# Patient Record
Sex: FEMALE | Race: BLACK OR AFRICAN AMERICAN | NOT HISPANIC OR LATINO | Employment: OTHER | URBAN - NONMETROPOLITAN AREA
[De-identification: names, ages, dates, MRNs, and addresses within clinical notes are randomized per-mention and may not be internally consistent; named-entity substitution may affect disease eponyms.]

---

## 2019-01-27 ENCOUNTER — HISTORICAL (OUTPATIENT)
Dept: ADMINISTRATIVE | Facility: HOSPITAL | Age: 84
End: 2019-01-27

## 2019-02-01 ENCOUNTER — HISTORICAL (OUTPATIENT)
Dept: ADMINISTRATIVE | Facility: HOSPITAL | Age: 84
End: 2019-02-01

## 2020-01-22 ENCOUNTER — HISTORICAL (OUTPATIENT)
Dept: ADMINISTRATIVE | Facility: HOSPITAL | Age: 85
End: 2020-01-22

## 2021-04-21 ENCOUNTER — HISTORICAL (OUTPATIENT)
Dept: ADMINISTRATIVE | Facility: HOSPITAL | Age: 86
End: 2021-04-21

## 2021-06-04 ENCOUNTER — HISTORICAL (OUTPATIENT)
Dept: ADMINISTRATIVE | Facility: HOSPITAL | Age: 86
End: 2021-06-04

## 2021-10-18 ENCOUNTER — HISTORICAL (OUTPATIENT)
Dept: ADMINISTRATIVE | Facility: HOSPITAL | Age: 86
End: 2021-10-18

## 2022-10-13 ENCOUNTER — HISTORICAL (OUTPATIENT)
Dept: ADMINISTRATIVE | Facility: HOSPITAL | Age: 87
End: 2022-10-13

## 2023-03-03 ENCOUNTER — HOSPITAL ENCOUNTER (OUTPATIENT)
Facility: HOSPITAL | Age: 88
Discharge: HOME | End: 2023-03-04
Admitting: FAMILY MEDICINE
Payer: MEDICARE

## 2023-03-03 DIAGNOSIS — R50.9 FEVER, UNSPECIFIED FEVER CAUSE: Primary | ICD-10-CM

## 2023-03-03 DIAGNOSIS — R53.1 WEAKNESS: ICD-10-CM

## 2023-03-03 DIAGNOSIS — A41.9 SEPSIS: ICD-10-CM

## 2023-03-03 DIAGNOSIS — J06.9 VIRAL URI: ICD-10-CM

## 2023-03-03 LAB
ALBUMIN SERPL-MCNC: 4.3 G/DL (ref 3.4–5)
ALBUMIN/GLOB SERPL: 1 RATIO
ALP SERPL-CCNC: 98 UNIT/L (ref 50–144)
ALT SERPL-CCNC: 17 UNIT/L (ref 1–45)
ANION GAP SERPL CALC-SCNC: 9 MEQ/L (ref 2–13)
APPEARANCE UR: CLEAR
AST SERPL-CCNC: 26 UNIT/L (ref 14–36)
B PERT.PT PRMT NPH QL NAA+NON-PROBE: NOT DETECTED
BACTERIA #/AREA URNS AUTO: ABNORMAL /HPF
BASOPHILS # BLD AUTO: 0.02 X10(3)/MCL (ref 0.01–0.08)
BASOPHILS NFR BLD AUTO: 0.2 % (ref 0.1–1.2)
BILIRUB UR QL STRIP.AUTO: NEGATIVE MG/DL
BILIRUBIN DIRECT+TOT PNL SERPL-MCNC: 1.6 MG/DL (ref 0–1)
BUN SERPL-MCNC: 11 MG/DL (ref 7–20)
C PNEUM DNA NPH QL NAA+NON-PROBE: NOT DETECTED
CALCIUM SERPL-MCNC: 9.9 MG/DL (ref 8.4–10.2)
CHLORIDE SERPL-SCNC: 103 MMOL/L (ref 98–110)
CO2 SERPL-SCNC: 27 MMOL/L (ref 21–32)
COLOR UR AUTO: YELLOW
CREAT SERPL-MCNC: 0.7 MG/DL (ref 0.66–1.25)
CREAT/UREA NIT SERPL: 16 (ref 12–20)
EOSINOPHIL # BLD AUTO: 0.01 X10(3)/MCL (ref 0.04–0.36)
EOSINOPHIL NFR BLD AUTO: 0.1 % (ref 0.7–7)
ERYTHROCYTE [DISTWIDTH] IN BLOOD BY AUTOMATED COUNT: 11.6 % (ref 11–14.5)
FLUAV H1 2009 PAN RNA NPH NAA+NON-PROBE: NORMAL
FLUAV H1 RNA NPH QL NAA+NON-PROBE: NORMAL
FLUAV H3 RNA NPH QL NAA+NON-PROBE: NORMAL
FLUAV RNA NPH QL NAA+NON-PROBE: NOT DETECTED
FLUAV RNA RESP QL NAA+PROBE: NORMAL
FLUBV RNA NPH QL NAA+NON-PROBE: NOT DETECTED
GFR SERPLBLD CREATININE-BSD FMLA CKD-EPI: 85 MLS/MIN/1.73/M2
GLOBULIN SER-MCNC: 4.2 GM/DL (ref 2–3.9)
GLUCOSE SERPL-MCNC: 149 MG/DL (ref 70–115)
GLUCOSE UR QL STRIP.AUTO: NEGATIVE MG/DL
HADV DNA NPH QL NAA+NON-PROBE: NOT DETECTED
HCOV 229E RNA NPH QL NAA+NON-PROBE: NOT DETECTED
HCOV HKU1 RNA NPH QL NAA+NON-PROBE: NOT DETECTED
HCOV NL63 RNA NPH QL NAA+NON-PROBE: NOT DETECTED
HCOV OC43 RNA NPH QL NAA+NON-PROBE: NOT DETECTED
HCT VFR BLD AUTO: 39.2 % (ref 36–48)
HGB BLD-MCNC: 13 G/DL (ref 11.8–16)
HMPV RNA NPH QL NAA+NON-PROBE: NOT DETECTED
HPIV1 RNA NPH QL NAA+NON-PROBE: NOT DETECTED
HPIV2 RNA NPH QL NAA+NON-PROBE: NOT DETECTED
HPIV3 RNA NPH QL NAA+NON-PROBE: NOT DETECTED
HPIV4 RNA NPH QL NAA+NON-PROBE: NOT DETECTED
IMM GRANULOCYTES # BLD AUTO: 0.03 X10(3)/MCL (ref 0–0.03)
IMM GRANULOCYTES NFR BLD AUTO: 0.3 % (ref 0–0.5)
KETONES UR QL STRIP.AUTO: NEGATIVE MG/DL
LACTATE SERPL-SCNC: 1.2 MMOL/L (ref 0.4–2)
LEUKOCYTE ESTERASE UR QL STRIP.AUTO: NEGATIVE UNIT/L
LYMPHOCYTES # BLD AUTO: 1.19 X10(3)/MCL (ref 1.16–3.74)
LYMPHOCYTES NFR BLD AUTO: 10.3 % (ref 20–55)
M PNEUMO DNA NPH QL NAA+NON-PROBE: NOT DETECTED
MAGNESIUM SERPL-MCNC: 1.9 MG/DL (ref 1.8–2.4)
MCH RBC QN AUTO: 32.6 PG (ref 27–34)
MCV RBC AUTO: 98.2 FL (ref 79–99)
MEAN CELL HEMOGLOBIN CONCENTRATION (OHS) G/DL: 33.2 G/DL (ref 31–37)
MONOCYTES # BLD AUTO: 1.02 X10(3)/MCL (ref 0.24–0.36)
MONOCYTES NFR BLD AUTO: 8.9 % (ref 4.7–12.5)
MUCOUS THREADS URNS QL MICRO: ABNORMAL /LPF
NEUTROPHILS # BLD AUTO: 9.23 X10(3)/MCL (ref 1.56–6.13)
NEUTROPHILS NFR BLD AUTO: 80.2 % (ref 37–73)
NITRITE UR QL STRIP.AUTO: NEGATIVE
NRBC BLD AUTO-RTO: 0 % (ref 0–1)
PH UR STRIP.AUTO: 7.5 [PH]
PLATELET # BLD AUTO: 282 X10(3)/MCL (ref 140–371)
PLATELET # BLD EST: ADEQUATE 10*3/UL
PMV BLD AUTO: 9.5 FL (ref 9.4–12.4)
POTASSIUM SERPL-SCNC: 4 MMOL/L (ref 3.5–5.1)
PROT SERPL-MCNC: 8.5 GM/DL (ref 6.3–8.2)
PROT UR QL STRIP.AUTO: NEGATIVE MG/DL
RBC # BLD AUTO: 3.99 X10(6)/MCL (ref 4–5.1)
RBC #/AREA URNS AUTO: ABNORMAL /HPF
RBC MORPH BLD: NORMAL
RBC UR QL AUTO: ABNORMAL UNIT/L
RSV RNA NPH QL NAA+NON-PROBE: NOT DETECTED
RSV RNA NPH QL NAA+NON-PROBE: NOT DETECTED
RV+EV RNA NPH QL NAA+NON-PROBE: NOT DETECTED
SARS-COV-2 RNA RESP QL NAA+PROBE: NOT DETECTED
SODIUM SERPL-SCNC: 139 MMOL/L (ref 135–145)
SP GR UR STRIP.AUTO: 1.01
SQUAMOUS #/AREA URNS AUTO: ABNORMAL /HPF
UROBILINOGEN UR STRIP-ACNC: 1 MG/DL
WBC # SPEC AUTO: 11.5 X10(3)/MCL (ref 4–11.5)
WBC #/AREA URNS AUTO: ABNORMAL /HPF

## 2023-03-03 PROCEDURE — 87040 BLOOD CULTURE FOR BACTERIA: CPT

## 2023-03-03 PROCEDURE — 36415 COLL VENOUS BLD VENIPUNCTURE: CPT

## 2023-03-03 PROCEDURE — 81001 URINALYSIS AUTO W/SCOPE: CPT

## 2023-03-03 PROCEDURE — 87798 DETECT AGENT NOS DNA AMP: CPT

## 2023-03-03 PROCEDURE — 93005 ELECTROCARDIOGRAM TRACING: CPT

## 2023-03-03 PROCEDURE — 63600175 PHARM REV CODE 636 W HCPCS

## 2023-03-03 PROCEDURE — 25000003 PHARM REV CODE 250

## 2023-03-03 PROCEDURE — 80053 COMPREHEN METABOLIC PANEL: CPT

## 2023-03-03 PROCEDURE — 87088 URINE BACTERIA CULTURE: CPT

## 2023-03-03 PROCEDURE — 93010 EKG 12-LEAD: ICD-10-PCS | Mod: ,,, | Performed by: STUDENT IN AN ORGANIZED HEALTH CARE EDUCATION/TRAINING PROGRAM

## 2023-03-03 PROCEDURE — 96374 THER/PROPH/DIAG INJ IV PUSH: CPT

## 2023-03-03 PROCEDURE — 85025 COMPLETE CBC W/AUTO DIFF WBC: CPT

## 2023-03-03 PROCEDURE — 83605 ASSAY OF LACTIC ACID: CPT

## 2023-03-03 PROCEDURE — 96372 THER/PROPH/DIAG INJ SC/IM: CPT

## 2023-03-03 PROCEDURE — G0378 HOSPITAL OBSERVATION PER HR: HCPCS

## 2023-03-03 PROCEDURE — 93010 ELECTROCARDIOGRAM REPORT: CPT | Mod: ,,, | Performed by: STUDENT IN AN ORGANIZED HEALTH CARE EDUCATION/TRAINING PROGRAM

## 2023-03-03 PROCEDURE — 83735 ASSAY OF MAGNESIUM: CPT

## 2023-03-03 PROCEDURE — P9612 CATHETERIZE FOR URINE SPEC: HCPCS

## 2023-03-03 PROCEDURE — 99285 EMERGENCY DEPT VISIT HI MDM: CPT | Mod: 25

## 2023-03-03 RX ORDER — PANTOPRAZOLE SODIUM 40 MG/1
40 TABLET, DELAYED RELEASE ORAL DAILY
Status: DISCONTINUED | OUTPATIENT
Start: 2023-03-04 | End: 2023-03-04 | Stop reason: HOSPADM

## 2023-03-03 RX ORDER — SODIUM CHLORIDE 9 MG/ML
1000 INJECTION, SOLUTION INTRAVENOUS
Status: COMPLETED | OUTPATIENT
Start: 2023-03-03 | End: 2023-03-03

## 2023-03-03 RX ORDER — OLANZAPINE 5 MG/1
5 TABLET ORAL NIGHTLY
Status: DISCONTINUED | OUTPATIENT
Start: 2023-03-03 | End: 2023-03-04 | Stop reason: HOSPADM

## 2023-03-03 RX ORDER — DONEPEZIL HYDROCHLORIDE 10 MG/1
10 TABLET, FILM COATED ORAL NIGHTLY
Status: ON HOLD | COMMUNITY
Start: 2023-01-05 | End: 2023-04-24

## 2023-03-03 RX ORDER — IBUPROFEN 600 MG/1
600 TABLET ORAL EVERY 6 HOURS PRN
Status: DISCONTINUED | OUTPATIENT
Start: 2023-03-03 | End: 2023-03-04 | Stop reason: HOSPADM

## 2023-03-03 RX ORDER — SODIUM CHLORIDE 9 MG/ML
INJECTION, SOLUTION INTRAVENOUS ONCE
Status: DISCONTINUED | OUTPATIENT
Start: 2023-03-04 | End: 2023-03-03

## 2023-03-03 RX ORDER — PANTOPRAZOLE SODIUM 40 MG/1
40 TABLET, DELAYED RELEASE ORAL DAILY
Status: ON HOLD | COMMUNITY
Start: 2023-01-05 | End: 2023-04-24

## 2023-03-03 RX ORDER — ACETAMINOPHEN 500 MG
1000 TABLET ORAL
Status: COMPLETED | OUTPATIENT
Start: 2023-03-03 | End: 2023-03-03

## 2023-03-03 RX ORDER — BUDESONIDE AND FORMOTEROL FUMARATE DIHYDRATE 160; 4.5 UG/1; UG/1
2 AEROSOL RESPIRATORY (INHALATION) 2 TIMES DAILY
Status: ON HOLD | COMMUNITY
Start: 2023-01-25 | End: 2023-04-24

## 2023-03-03 RX ORDER — SODIUM CHLORIDE 9 MG/ML
INJECTION, SOLUTION INTRAVENOUS CONTINUOUS
Status: DISCONTINUED | OUTPATIENT
Start: 2023-03-03 | End: 2023-03-04 | Stop reason: HOSPADM

## 2023-03-03 RX ORDER — FLUTICASONE FUROATE AND VILANTEROL 100; 25 UG/1; UG/1
1 POWDER RESPIRATORY (INHALATION) DAILY
Status: DISCONTINUED | OUTPATIENT
Start: 2023-03-04 | End: 2023-03-04 | Stop reason: HOSPADM

## 2023-03-03 RX ORDER — ACETAMINOPHEN 325 MG/1
650 TABLET ORAL EVERY 8 HOURS PRN
Status: DISCONTINUED | OUTPATIENT
Start: 2023-03-03 | End: 2023-03-04 | Stop reason: HOSPADM

## 2023-03-03 RX ORDER — ENOXAPARIN SODIUM 100 MG/ML
40 INJECTION SUBCUTANEOUS EVERY 24 HOURS
Status: DISCONTINUED | OUTPATIENT
Start: 2023-03-03 | End: 2023-03-04 | Stop reason: HOSPADM

## 2023-03-03 RX ORDER — TALC
6 POWDER (GRAM) TOPICAL NIGHTLY PRN
Status: DISCONTINUED | OUTPATIENT
Start: 2023-03-03 | End: 2023-03-04 | Stop reason: HOSPADM

## 2023-03-03 RX ORDER — POLYETHYLENE GLYCOL 3350 17 G/17G
17 POWDER, FOR SOLUTION ORAL DAILY
Status: DISCONTINUED | OUTPATIENT
Start: 2023-03-04 | End: 2023-03-04 | Stop reason: HOSPADM

## 2023-03-03 RX ORDER — DEXAMETHASONE SODIUM PHOSPHATE 4 MG/ML
8 INJECTION, SOLUTION INTRA-ARTICULAR; INTRALESIONAL; INTRAMUSCULAR; INTRAVENOUS; SOFT TISSUE
Status: COMPLETED | OUTPATIENT
Start: 2023-03-03 | End: 2023-03-03

## 2023-03-03 RX ORDER — MONTELUKAST SODIUM 10 MG/1
10 TABLET ORAL DAILY
Status: ON HOLD | COMMUNITY
Start: 2023-01-05 | End: 2023-04-24

## 2023-03-03 RX ORDER — SODIUM CHLORIDE 0.9 % (FLUSH) 0.9 %
10 SYRINGE (ML) INJECTION
Status: DISCONTINUED | OUTPATIENT
Start: 2023-03-03 | End: 2023-03-04 | Stop reason: HOSPADM

## 2023-03-03 RX ORDER — OLANZAPINE 5 MG/1
5 TABLET ORAL NIGHTLY
Status: ON HOLD | COMMUNITY
Start: 2023-01-25 | End: 2023-03-04 | Stop reason: HOSPADM

## 2023-03-03 RX ORDER — DONEPEZIL HYDROCHLORIDE 10 MG/1
10 TABLET, FILM COATED ORAL NIGHTLY
Status: DISCONTINUED | OUTPATIENT
Start: 2023-03-03 | End: 2023-03-04 | Stop reason: HOSPADM

## 2023-03-03 RX ORDER — VITAMIN E MIXED 400 UNIT
400 CAPSULE ORAL DAILY
Status: DISCONTINUED | OUTPATIENT
Start: 2023-03-04 | End: 2023-03-04 | Stop reason: HOSPADM

## 2023-03-03 RX ORDER — MONTELUKAST SODIUM 10 MG/1
10 TABLET ORAL DAILY
Status: DISCONTINUED | OUTPATIENT
Start: 2023-03-04 | End: 2023-03-04 | Stop reason: HOSPADM

## 2023-03-03 RX ADMIN — SODIUM CHLORIDE: 9 INJECTION, SOLUTION INTRAVENOUS at 11:03

## 2023-03-03 RX ADMIN — DEXAMETHASONE SODIUM PHOSPHATE 8 MG: 4 INJECTION, SOLUTION INTRA-ARTICULAR; INTRALESIONAL; INTRAMUSCULAR; INTRAVENOUS; SOFT TISSUE at 09:03

## 2023-03-03 RX ADMIN — SODIUM CHLORIDE 1000 ML: 9 INJECTION, SOLUTION INTRAVENOUS at 07:03

## 2023-03-03 RX ADMIN — DONEPEZIL HYDROCHLORIDE 10 MG: 10 TABLET, FILM COATED ORAL at 11:03

## 2023-03-03 RX ADMIN — ENOXAPARIN SODIUM 40 MG: 100 INJECTION SUBCUTANEOUS at 11:03

## 2023-03-03 RX ADMIN — ACETAMINOPHEN 1000 MG: 500 TABLET, FILM COATED ORAL at 07:03

## 2023-03-04 VITALS
RESPIRATION RATE: 16 BRPM | DIASTOLIC BLOOD PRESSURE: 64 MMHG | SYSTOLIC BLOOD PRESSURE: 121 MMHG | HEIGHT: 61 IN | OXYGEN SATURATION: 94 % | TEMPERATURE: 97 F | HEART RATE: 70 BPM | BODY MASS INDEX: 21.84 KG/M2 | WEIGHT: 115.69 LBS

## 2023-03-04 LAB
ABS NEUT CALC (OHS): 6.7 X10(3)/MCL (ref 2.1–9.2)
ANION GAP SERPL CALC-SCNC: 7 MEQ/L (ref 2–13)
BUN SERPL-MCNC: 12 MG/DL (ref 7–20)
CALCIUM SERPL-MCNC: 9.3 MG/DL (ref 8.4–10.2)
CHLORIDE SERPL-SCNC: 108 MMOL/L (ref 98–110)
CO2 SERPL-SCNC: 25 MMOL/L (ref 21–32)
CREAT SERPL-MCNC: 0.51 MG/DL (ref 0.66–1.25)
CREAT/UREA NIT SERPL: 24 (ref 12–20)
ERYTHROCYTE [DISTWIDTH] IN BLOOD BY AUTOMATED COUNT: 11.3 % (ref 11–14.5)
GFR SERPLBLD CREATININE-BSD FMLA CKD-EPI: >90 MLS/MIN/1.73/M2
GLUCOSE SERPL-MCNC: 196 MG/DL (ref 70–115)
HCT VFR BLD AUTO: 36.4 % (ref 36–48)
HGB BLD-MCNC: 12.2 G/DL (ref 11.8–16)
IMM GRANULOCYTES # BLD AUTO: 0.04 X10(3)/MCL (ref 0–0.03)
IMM GRANULOCYTES NFR BLD AUTO: 0.5 % (ref 0–0.5)
LYMPHOCYTES NFR BLD MANUAL: 0.69 X10(3)/MCL
LYMPHOCYTES NFR BLD MANUAL: 9 % (ref 13–40)
MCH RBC QN AUTO: 32.6 PG (ref 27–34)
MCV RBC AUTO: 97.3 FL (ref 79–99)
MEAN CELL HEMOGLOBIN CONCENTRATION (OHS) G/DL: 33.5 G/DL (ref 31–37)
MONOCYTES NFR BLD MANUAL: 0.31 X10(3)/MCL (ref 0.1–1.3)
MONOCYTES NFR BLD MANUAL: 4 % (ref 2–11)
NEUTROPHILS NFR BLD MANUAL: 87 % (ref 47–80)
NRBC BLD AUTO-RTO: 0 % (ref 0–1)
PLATELET # BLD AUTO: 281 X10(3)/MCL (ref 140–371)
PLATELET # BLD EST: ADEQUATE 10*3/UL
PMV BLD AUTO: 9.8 FL (ref 9.4–12.4)
POTASSIUM SERPL-SCNC: 4.2 MMOL/L (ref 3.5–5.1)
RBC # BLD AUTO: 3.74 X10(6)/MCL (ref 4–5.1)
SODIUM SERPL-SCNC: 140 MMOL/L (ref 135–145)
TSH SERPL-ACNC: 0.39 UIU/ML (ref 0.36–3.74)
WBC # SPEC AUTO: 7.7 X10(3)/MCL (ref 4–11.5)

## 2023-03-04 PROCEDURE — 25000003 PHARM REV CODE 250

## 2023-03-04 PROCEDURE — G0378 HOSPITAL OBSERVATION PER HR: HCPCS

## 2023-03-04 PROCEDURE — 80048 BASIC METABOLIC PNL TOTAL CA: CPT | Performed by: INTERNAL MEDICINE

## 2023-03-04 PROCEDURE — 84443 ASSAY THYROID STIM HORMONE: CPT | Performed by: INTERNAL MEDICINE

## 2023-03-04 PROCEDURE — 36415 COLL VENOUS BLD VENIPUNCTURE: CPT | Performed by: INTERNAL MEDICINE

## 2023-03-04 PROCEDURE — 25000003 PHARM REV CODE 250: Performed by: INTERNAL MEDICINE

## 2023-03-04 PROCEDURE — 94640 AIRWAY INHALATION TREATMENT: CPT

## 2023-03-04 PROCEDURE — 94761 N-INVAS EAR/PLS OXIMETRY MLT: CPT

## 2023-03-04 PROCEDURE — 85027 COMPLETE CBC AUTOMATED: CPT | Performed by: INTERNAL MEDICINE

## 2023-03-04 PROCEDURE — 25000242 PHARM REV CODE 250 ALT 637 W/ HCPCS

## 2023-03-04 RX ADMIN — MONTELUKAST 10 MG: 10 TABLET, FILM COATED ORAL at 09:03

## 2023-03-04 RX ADMIN — PANTOPRAZOLE SODIUM 40 MG: 40 TABLET, DELAYED RELEASE ORAL at 09:03

## 2023-03-04 RX ADMIN — Medication 400 UNITS: at 09:03

## 2023-03-04 RX ADMIN — POLYETHYLENE GLYCOL 3350 17 G: 17 POWDER, FOR SOLUTION ORAL at 08:03

## 2023-03-04 RX ADMIN — THERA TABS 1 TABLET: TAB at 09:03

## 2023-03-04 RX ADMIN — FLUTICASONE FUROATE AND VILANTEROL TRIFENATATE 1 PUFF: 100; 25 POWDER RESPIRATORY (INHALATION) at 09:03

## 2023-03-04 NOTE — H&P
"[facility] Medicine  History & Physical    Patient Name: Lin Flowers  MRN: [id number]  Patient Class: OP- Observation  Admission Date: 3/3/2023  Attending Physician: Angela Agee MD  Primary Care Provider: Nilton Ndiaye MD         Patient information was obtained from via telemed    Subjective:     Principal Problem:<principal problem not specified>    Chief Complaint:   Chief Complaint   Patient presents with    Weakness     FAMILY REPORTS WEAKNESS AND FATIGUE FOR PAST FEW DAYS, SON "SHE JUST SLEEPS ALL DAY AND CAN BARELY STAND ANYMORE."        HPI: 88 yo female with hx of Dementia with behavior disturbance, asthma, GERD who is brought by family for 2-3 days of lethargy, no N/V/D, no myalgia, No dysuria, no HA, no neck pain, she is confused she had fever 101.8 in the ED. Ed physician wanted pt to be admitted for observation. Pt unable to give history family at bedside given the history, C/o weakness, poor appetite, dehydrated, last BM yesterday per family. Recently seen her PCP and reduced  her Zyprexa to 5 mg from 7.5 mg      Past Medical History:   Diagnosis Date    Asthma     Kidney stones        History reviewed. No pertinent surgical history.    Review of patient's allergies indicates:  No Known Allergies    No current facility-administered medications on file prior to encounter.     Current Outpatient Medications on File Prior to Encounter   Medication Sig    donepeziL (ARICEPT) 10 MG tablet Take 10 mg by mouth every evening.    montelukast (SINGULAIR) 10 mg tablet Take 10 mg by mouth once daily.    multivitamin capsule Take 1 capsule by mouth once daily.    OLANZapine (ZYPREXA) 5 MG tablet Take 5 mg by mouth every evening.    pantoprazole (PROTONIX) 40 MG tablet Take 40 mg by mouth once daily.    SYMBICORT 160-4.5 mcg/actuation HFAA 2 puffs 2 (two) times daily.    vitamin E 100 UNIT capsule Take 100 Units by mouth once daily.     Family History  "   None       Tobacco Use    Smoking status: Never    Smokeless tobacco: Never   Substance and Sexual Activity    Alcohol use: Never    Drug use: Never    Sexual activity: Not on file     Review of Systems   Constitutional:  Positive for appetite change, fatigue and fever.   HENT: Negative.     Eyes: Negative.    Respiratory:  Positive for cough.    Cardiovascular: Negative.    Gastrointestinal: Negative.    Endocrine: Negative.    Genitourinary: Negative.    Musculoskeletal: Negative.    Skin: Negative.    Allergic/Immunologic: Negative.    Neurological: Negative.    Hematological: Negative.    Psychiatric/Behavioral: Negative.     Objective:     Vital Signs (Most Recent):  Temp: 99.5 °F (37.5 °C) (03/03/23 2028)  Pulse: 86 (03/03/23 2246)  Resp: 20 (03/03/23 2246)  BP: (!) 152/72 (03/03/23 2246)  SpO2: 96 % (03/03/23 2246)   Vital Signs (24h Range):  Temp:  [99.5 °F (37.5 °C)-101.8 °F (38.8 °C)] 99.5 °F (37.5 °C)  Pulse:  [] 86  Resp:  [20-24] 20  SpO2:  [96 %-98 %] 96 %  BP: (134-157)/(69-72) 152/72     Weight: 53.1 kg (117 lb)  Body mass index is 22.11 kg/m².    Physical Exam  Constitutional:       Appearance: Normal appearance. She is normal weight.   HENT:      Head: Normocephalic and atraumatic.   Eyes:      Conjunctiva/sclera: Conjunctivae normal.   Cardiovascular:      Rate and Rhythm: Normal rate and regular rhythm.      Pulses: Normal pulses.      Heart sounds: Normal heart sounds.   Pulmonary:      Effort: Pulmonary effort is normal.      Breath sounds: Normal breath sounds.   Abdominal:      General: Abdomen is flat. Bowel sounds are normal.      Palpations: Abdomen is soft.   Musculoskeletal:         General: Normal range of motion.      Cervical back: Normal range of motion and neck supple.   Skin:     General: Skin is warm and dry.   Neurological:      General: No focal deficit present.      Mental Status: She is disoriented.   Psychiatric:         Mood and Affect: Mood normal.            Significant Labs: All pertinent labs within the past 24 hours have been reviewed.  ABGs: No results for input(s): PH, PCO2, HCO3, POCSATURATED, BE, TOTALHB, COHB, METHB, O2HB, POCFIO2, PO2 in the last 48 hours.  Bilirubin:   Recent Labs   Lab 03/03/23 1826   BILITOT 1.6*     Blood Culture: No results for input(s): LABBLOO in the last 48 hours.  BMP:   Recent Labs   Lab 03/03/23 1826      K 4.0   CO2 27   BUN 11.0   CREATININE 0.70   CALCIUM 9.9   MG 1.90     CBC:   Recent Labs   Lab 03/03/23 1826   WBC 11.5   HGB 13.0   HCT 39.2        CMP:   Recent Labs   Lab 03/03/23 1826      K 4.0   CO2 27   BUN 11.0   CREATININE 0.70   CALCIUM 9.9   ALBUMIN 4.3   BILITOT 1.6*   ALKPHOS 98   AST 26   ALT 17     Cardiac Markers: No results for input(s): CKMB, MYOGLOBIN, BNP, TROPISTAT in the last 48 hours.  Coagulation: No results for input(s): PT, INR, APTT in the last 48 hours.  Lactic Acid: No results for input(s): LACTATE in the last 48 hours.  Lipase: No results for input(s): LIPASE in the last 48 hours.  Lipid Panel: No results for input(s): CHOL, HDL, LDLCALC, TRIG, CHOLHDL in the last 48 hours.  Magnesium:   Recent Labs   Lab 03/03/23 1826   MG 1.90     Pathology Results  (Last 10 years)      None          POCT Glucose: No results for input(s): POCTGLUCOSE in the last 48 hours.  Prealbumin: No results for input(s): PREALBUMIN in the last 48 hours.  Respiratory Culture: No results for input(s): GSRESP, RESPIRATORYC in the last 48 hours.  Troponin: No results for input(s): TROPONINI, TROPONINIHS in the last 48 hours.  TSH: No results for input(s): TSH in the last 4320 hours.  Urine Culture: No results for input(s): LABURIN in the last 48 hours.  Urine Studies:   Recent Labs   Lab 03/03/23 2026   APPEARANCEUA Clear   PROTEINUA Negative   BILIRUBINUA Negative   UROBILINOGEN 1.0   LEUKOCYTESUR Negative   RBCUA 0-2   WBCUA 0-2   BACTERIA 2+*       Significant Imaging:   CXR no acute  finding    Assessment/Plan:     No notes have been filed under this hospital service.  Service: Hospital Medicine    VTE Risk Mitigation (From admission, onward)         Ordered     enoxaparin injection 40 mg  Daily         03/03/23 2249     IP VTE HIGH RISK PATIENT  Once         03/03/23 2249               - Fever unclear etiology ? Viral vs other cause  -Weakness  - Dehydration  -Dementia with behavior disturbance  -Asthma  -GERD  Plan:  - Hydrate and monitor   -Continue home meds  -Check TSH, Repeat labs in AM  _ DVT ppx on Lovenox  Pt is Full code SDM is her son Luis Antonio Flowers Sr  Pt is seen and examined via telemed. Pt is in [facility]. I am in [place], [place]. Nursing staff assisted with evaluation. Software is Audio/ Video  Pt is being admitted as an observation status to the hospitalist service for further eval and treatment    Angela Agee MD  Department of Hospital Medicine   [facility]

## 2023-03-04 NOTE — HPI
86 yo female with hx of Dementia with behavior disturbance, asthma, GERD who is brought by family for 2-3 days of lethargy, no N/V/D, no myalgia, No dysuria, no HA, no neck pain, she is confused she had fever 101.8 in the ED. Ed physician wanted pt to be admitted for observation. Pt unable to give history family at bedside given the history, C/o weakness, poor appetite, dehydrated, last BM yesterday per family. Recently seen her PCP and reduced  her Zyprexa to 5 mg from 7.5 mg

## 2023-03-04 NOTE — ED PROVIDER NOTES
"Encounter Date: 3/3/2023       History     Chief Complaint   Patient presents with    Weakness     FAMILY REPORTS WEAKNESS AND FATIGUE FOR PAST FEW DAYS, SON "SHE JUST SLEEPS ALL DAY AND CAN BARELY STAND ANYMORE."     HPI  Review of patient's allergies indicates:  No Known Allergies  Past Medical History:   Diagnosis Date    Asthma     Kidney stones      History reviewed. No pertinent surgical history.  History reviewed. No pertinent family history.  Social History     Tobacco Use    Smoking status: Never    Smokeless tobacco: Never   Substance Use Topics    Alcohol use: Never    Drug use: Never     Review of Systems    Physical Exam     Initial Vitals [03/03/23 1803]   BP Pulse Resp Temp SpO2   (!) 157/69 100 (!) 24 (!) 101.8 °F (38.8 °C) 97 %      MAP       --         Physical Exam    ED Course   Procedures  Labs Reviewed   COMPREHENSIVE METABOLIC PANEL - Abnormal; Notable for the following components:       Result Value    Glucose Level 149 (*)     Protein Total 8.5 (*)     Globulin 4.2 (*)     Bilirubin Total 1.6 (*)     All other components within normal limits   URINALYSIS, REFLEX TO URINE CULTURE - Abnormal; Notable for the following components:    Blood, UA Trace-Intact (*)     All other components within normal limits    Narrative:      URINE STABILITY IS 2 HOURS AT ROOM TEMP OR    SIX HOURS REFRIGERATED. PERFORMING TESTING ON    SPECIMENS GREATER THAN THIS AGE MAY AFFECT THE    FOLLOWING TESTS:    PH          SPECIFIC GRAVITY           BLOOD    CLARITY     BILIRUBIN               UROBILINOGEN   CBC WITH DIFFERENTIAL - Abnormal; Notable for the following components:    RBC 3.99 (*)     Neut % 80.2 (*)     Lymph % 10.3 (*)     Eos % 0.1 (*)     Neut # 9.23 (*)     Mono # 1.02 (*)     Eos # 0.01 (*)     All other components within normal limits   URINALYSIS, MICROSCOPIC - Abnormal; Notable for the following components:    Bacteria, UA 2+ (*)     Mucous, UA Small (*)     Squamous Epithelial Cells, UA Few (*)  "    All other components within normal limits   LACTIC ACID, PLASMA - Normal   MAGNESIUM - Normal   BLOOD SMEAR MICROSCOPIC EXAM ([facility]) - Normal   BLOOD CULTURE [facility]   BLOOD CULTURE [facility]   CULTURE, URINE   RESPIRATORY PANEL    Narrative:     The BioFire Respiratory Panel 2.1 (RP2.1) is a PCR-based multiplexed nucleic acid test intended for use with the BioFire® 2.0 for simultaneous qualitative detection and identification of multiple respiratory viral and bacterial nucleic acids in nasopharyngeal swabs (NPS) obtained from individuals suspected of respiratory tract infections.   CBC W/ AUTO DIFFERENTIAL    Narrative:     The following orders were created for panel order CBC auto differential.  Procedure                               Abnormality         Status                     ---------                               -----------         ------                     CBC with Differential[[id number]]        Abnormal            Final result                 Please view results for these tests on the individual orders.          Imaging Results              X-Ray Chest AP Portable (Preliminary result)  Result time 03/03/23 18:45:05      Wet Read by García Encarnacion MD (03/03/23 18:45:05, [facility], Emergency Medicine)    Normal cardiac silhouette, no infiltrates.                                     Medications   acetaminophen tablet 1,000 mg (1,000 mg Oral Given 3/3/23 1925)   0.9%  NaCl infusion (1,000 mLs Intravenous New Bag 3/3/23 1939)   dexAMETHasone injection 8 mg (8 mg Intravenous Given 3/3/23 2134)     Medical Decision Making:   CXR, labs point to a viral infection, but Resp. panel is negative.  Will order Decadron, and Observe overnight.                        Clinical Impression:   Final diagnoses:  [A41.9] Sepsis  [R50.9] Fever, unspecified fever cause (Primary)  [J06.9] Viral URI  [R53.1] Weakness        ED Disposition Condition    Observation Good                García Encarnacion,  MD  03/03/23 2129       García Encarnacion MD  03/03/23 2220

## 2023-03-04 NOTE — NURSING
Pt dc home with family, son present at the time of dc, verbalized understanding of dc instructions, pt to f/u with Dr Ndiaye in 1 week. Appt to be made by REJI on Monday 3/6/23,

## 2023-03-04 NOTE — DISCHARGE SUMMARY
[facility]  Hospital Medicine  Discharge Summary      Patient Name: Lni Flowers  MRN: [id number]  KIM: [id number]  Patient Class: OP- Observation  Admission Date: 3/3/2023  Hospital Length of Stay: 0 days  Discharge Date and Time:  03/04/2023 12:10 PM  Attending Physician: Nissa Miranda MD   Discharging Provider: Endy Don MD  Primary Care Provider: Nilton Ndiaye MD    Primary Care Team: Networked reference to record PCT     HPI:   88 yo female with hx of Dementia with behavior disturbance, asthma, GERD who is brought by family for 2-3 days of lethargy, no N/V/D, no myalgia, No dysuria, no HA, no neck pain, she is confused she had fever 101.8 in the ED. Ed physician wanted pt to be admitted for observation. Pt unable to give history family at bedside given the history, C/o weakness, poor appetite, dehydrated, last BM yesterday per family. Recently seen her PCP and reduced  her Zyprexa to 5 mg from 7.5 mg      * No surgery found *      Hospital Course:   03/04/2023 brief discharge summary elderly patient admitted to the hospital observation for fever of unknown origin.  Overnight her fever has resolved she is not had any antibiotics her lab looks normal there is no evidence of any infection she desperately wants to go home therefore we discharge her home with follow-up with Dr. Landa within a week.  She is to come back if she has any further fever.  Family does request that her Zyprexa be stopped because they do not think she needs it.       Goals of Care Treatment Preferences:  Code Status: Full Code      Consults:   Consults (From admission, onward)        Status Ordering Provider     IP consult to case management  Once        Provider:  (Not yet assigned)    Acknowledged NISSA MIRANDA     Inpatient virtual consult to Hospital Medicine  Once        Provider:  (Not yet assigned)    Acknowledged DHARA JOE          No notes have been filed under this hospital  service.  Service: Hospital Medicine    Final Active Diagnoses:    Diagnosis Date Noted POA    PRINCIPAL PROBLEM:  Fever [R50.9] 03/03/2023 Unknown    Weakness [R53.1] 03/03/2023 Unknown      Problems Resolved During this Admission:       Discharged Condition: good    Disposition: Home or Self Care    Follow Up:   Follow-up Information     Nilton Ndiaye MD Follow up in 1 week(s).    Specialty: Internal Medicine  [facility] information[facility]  [facility] [id number] [phone]                       Patient Instructions:   No discharge procedures on file.    Significant Diagnostic Studies:     Pending Diagnostic Studies:     None         Medications:  Reconciled Home Medications:      Medication List      CONTINUE taking these medications    donepeziL 10 MG tablet  Commonly known as: ARICEPT  Take 10 mg by mouth every evening.     montelukast 10 mg tablet  Commonly known as: SINGULAIR  Take 10 mg by mouth once daily.     multivitamin capsule  Take 1 capsule by mouth once daily.     pantoprazole 40 MG tablet  Commonly known as: PROTONIX  Take 40 mg by mouth once daily.     SYMBICORT 160-4.5 mcg/actuation Hfaa  Generic drug: budesonide-formoterol 160-4.5 mcg  2 puffs 2 (two) times daily.     vitamin E 100 UNIT capsule  Take 100 Units by mouth once daily.        STOP taking these medications    OLANZapine 5 MG tablet  Commonly known as: ZyPREXA            Indwelling Lines/Drains at time of discharge:   Lines/Drains/Airways     None               Physical Activity: Not on file     Time spent on the discharge of patient: 35 minutes    Physical Exam  Constitutional:       Appearance: Normal appearance. She is normal weight.   HENT:      Head: Normocephalic and atraumatic.   Eyes:      Conjunctiva/sclera: Conjunctivae normal.   Cardiovascular:      Rate and Rhythm: Normal rate and regular rhythm.      Pulses: Normal pulses.      Heart sounds: Normal heart sounds.   Pulmonary:      Effort: Pulmonary effort is  normal.      Breath sounds: Normal breath sounds.   Abdominal:      General: Abdomen is flat. Bowel sounds are normal.      Palpations: Abdomen is soft.   Musculoskeletal:         General: Normal range of motion.      Cervical back: Normal range of motion and neck supple.   Skin:     General: Skin is warm and dry.   Neurological:      General: No focal deficit present.      Mental Status: She is oriented.   Psychiatric:         Mood and Affect: Mood normal.          Endy Don MD  Department of Hospital Medicine  [facility]

## 2023-03-04 NOTE — HOSPITAL COURSE
03/04/2023 brief discharge summary elderly patient admitted to the hospital observation for fever of unknown origin.  Overnight her fever has resolved she is not had any antibiotics her lab looks normal there is no evidence of any infection she desperately wants to go home therefore we discharge her home with follow-up with Dr. Landa within a week.  She is to come back if she has any further fever.  Family does request that her Zyprexa be stopped because they do not think she needs it.

## 2023-03-04 NOTE — SUBJECTIVE & OBJECTIVE
Past Medical History:   Diagnosis Date    Asthma     Kidney stones        History reviewed. No pertinent surgical history.    Review of patient's allergies indicates:  No Known Allergies    No current facility-administered medications on file prior to encounter.     Current Outpatient Medications on File Prior to Encounter   Medication Sig    donepeziL (ARICEPT) 10 MG tablet Take 10 mg by mouth every evening.    montelukast (SINGULAIR) 10 mg tablet Take 10 mg by mouth once daily.    multivitamin capsule Take 1 capsule by mouth once daily.    OLANZapine (ZYPREXA) 5 MG tablet Take 5 mg by mouth every evening.    pantoprazole (PROTONIX) 40 MG tablet Take 40 mg by mouth once daily.    SYMBICORT 160-4.5 mcg/actuation HFAA 2 puffs 2 (two) times daily.    vitamin E 100 UNIT capsule Take 100 Units by mouth once daily.     Family History    None       Tobacco Use    Smoking status: Never    Smokeless tobacco: Never   Substance and Sexual Activity    Alcohol use: Never    Drug use: Never    Sexual activity: Not on file     Review of Systems   Constitutional:  Positive for appetite change, fatigue and fever.   HENT: Negative.     Eyes: Negative.    Respiratory:  Positive for cough.    Cardiovascular: Negative.    Gastrointestinal: Negative.    Endocrine: Negative.    Genitourinary: Negative.    Musculoskeletal: Negative.    Skin: Negative.    Allergic/Immunologic: Negative.    Neurological: Negative.    Hematological: Negative.    Psychiatric/Behavioral: Negative.     Objective:     Vital Signs (Most Recent):  Temp: 99.5 °F (37.5 °C) (03/03/23 2028)  Pulse: 86 (03/03/23 2246)  Resp: 20 (03/03/23 2246)  BP: (!) 152/72 (03/03/23 2246)  SpO2: 96 % (03/03/23 2246)   Vital Signs (24h Range):  Temp:  [99.5 °F (37.5 °C)-101.8 °F (38.8 °C)] 99.5 °F (37.5 °C)  Pulse:  [] 86  Resp:  [20-24] 20  SpO2:  [96 %-98 %] 96 %  BP: (134-157)/(69-72) 152/72     Weight: 53.1 kg (117 lb)  Body mass index is 22.11 kg/m².    Physical  Exam  Constitutional:       Appearance: Normal appearance. She is normal weight.   HENT:      Head: Normocephalic and atraumatic.   Eyes:      Conjunctiva/sclera: Conjunctivae normal.   Cardiovascular:      Rate and Rhythm: Normal rate and regular rhythm.      Pulses: Normal pulses.      Heart sounds: Normal heart sounds.   Pulmonary:      Effort: Pulmonary effort is normal.      Breath sounds: Normal breath sounds.   Abdominal:      General: Abdomen is flat. Bowel sounds are normal.      Palpations: Abdomen is soft.   Musculoskeletal:         General: Normal range of motion.      Cervical back: Normal range of motion and neck supple.   Skin:     General: Skin is warm and dry.   Neurological:      General: No focal deficit present.      Mental Status: She is disoriented.   Psychiatric:         Mood and Affect: Mood normal.           Significant Labs: All pertinent labs within the past 24 hours have been reviewed.  ABGs: No results for input(s): PH, PCO2, HCO3, POCSATURATED, BE, TOTALHB, COHB, METHB, O2HB, POCFIO2, PO2 in the last 48 hours.  Bilirubin:   Recent Labs   Lab 03/03/23  1826   BILITOT 1.6*     Blood Culture: No results for input(s): LABBLOO in the last 48 hours.  BMP:   Recent Labs   Lab 03/03/23  1826      K 4.0   CO2 27   BUN 11.0   CREATININE 0.70   CALCIUM 9.9   MG 1.90     CBC:   Recent Labs   Lab 03/03/23 1826   WBC 11.5   HGB 13.0   HCT 39.2        CMP:   Recent Labs   Lab 03/03/23  1826      K 4.0   CO2 27   BUN 11.0   CREATININE 0.70   CALCIUM 9.9   ALBUMIN 4.3   BILITOT 1.6*   ALKPHOS 98   AST 26   ALT 17     Cardiac Markers: No results for input(s): CKMB, MYOGLOBIN, BNP, TROPISTAT in the last 48 hours.  Coagulation: No results for input(s): PT, INR, APTT in the last 48 hours.  Lactic Acid: No results for input(s): LACTATE in the last 48 hours.  Lipase: No results for input(s): LIPASE in the last 48 hours.  Lipid Panel: No results for input(s): CHOL, HDL, LDLCALC, TRIG,  CHOLHDL in the last 48 hours.  Magnesium:   Recent Labs   Lab 03/03/23  1826   MG 1.90     Pathology Results  (Last 10 years)      None          POCT Glucose: No results for input(s): POCTGLUCOSE in the last 48 hours.  Prealbumin: No results for input(s): PREALBUMIN in the last 48 hours.  Respiratory Culture: No results for input(s): GSRESP, RESPIRATORYC in the last 48 hours.  Troponin: No results for input(s): TROPONINI, TROPONINIHS in the last 48 hours.  TSH: No results for input(s): TSH in the last 4320 hours.  Urine Culture: No results for input(s): LABURIN in the last 48 hours.  Urine Studies:   Recent Labs   Lab 03/03/23 2026   APPEARANCEUA Clear   PROTEINUA Negative   BILIRUBINUA Negative   UROBILINOGEN 1.0   LEUKOCYTESUR Negative   RBCUA 0-2   WBCUA 0-2   BACTERIA 2+*       Significant Imaging:   CXR no acute finding

## 2023-03-07 LAB — BACTERIA UR CULT: NO GROWTH

## 2023-03-08 LAB
BACTERIA BLD CULT: NORMAL
BACTERIA BLD CULT: NORMAL

## 2023-04-12 ENCOUNTER — HOSPITAL ENCOUNTER (INPATIENT)
Facility: HOSPITAL | Age: 88
LOS: 4 days | Discharge: HOSPICE | DRG: 870 | End: 2023-04-17
Attending: FAMILY MEDICINE | Admitting: FAMILY MEDICINE
Payer: MEDICARE

## 2023-04-12 DIAGNOSIS — S22.41XA CLOSED FRACTURE OF MULTIPLE RIBS OF RIGHT SIDE, INITIAL ENCOUNTER: ICD-10-CM

## 2023-04-12 DIAGNOSIS — J93.83 PNEUMOTHORAX, ACUTE: ICD-10-CM

## 2023-04-12 DIAGNOSIS — R06.02 SOB (SHORTNESS OF BREATH): ICD-10-CM

## 2023-04-12 DIAGNOSIS — I26.99 BILATERAL PULMONARY EMBOLISM: ICD-10-CM

## 2023-04-12 DIAGNOSIS — R09.2 RESPIRATORY ARREST: ICD-10-CM

## 2023-04-12 DIAGNOSIS — E11.65 TYPE 2 DIABETES MELLITUS WITH HYPERGLYCEMIA, WITHOUT LONG-TERM CURRENT USE OF INSULIN: ICD-10-CM

## 2023-04-12 DIAGNOSIS — Z46.82 ENCOUNTER FOR CHEST TUBE PLACEMENT: ICD-10-CM

## 2023-04-12 DIAGNOSIS — I46.9 CARDIAC ARREST: Primary | ICD-10-CM

## 2023-04-12 DIAGNOSIS — J93.9 PNEUMOTHORAX: ICD-10-CM

## 2023-04-12 LAB
BASE EXCESS BLD CALC-SCNC: -15.9 MMOL/L (ref -2–2)
BLOOD GAS SAMPLE TYPE (OHS): ABNORMAL
ERYTHROCYTE [DISTWIDTH] IN BLOOD BY AUTOMATED COUNT: 13.8 % (ref 11–14.5)
FIO2 BLOOD GAS (OHS): 100 %
HCO3 BLDA-SCNC: 12.2 MMOL/L (ref 22–26)
HCT VFR BLD AUTO: 25.7 % (ref 36–48)
HGB BLD-MCNC: 7.9 G/DL (ref 11.8–16)
IMM GRANULOCYTES # BLD AUTO: 0.9 X10(3)/MCL (ref 0–0.03)
IMM GRANULOCYTES NFR BLD AUTO: 6.1 % (ref 0–0.5)
MCH RBC QN AUTO: 31.2 PG (ref 27–34)
MCV RBC AUTO: 101.6 FL (ref 79–99)
MEAN CELL HEMOGLOBIN CONCENTRATION (OHS) G/DL: 30.7 G/DL (ref 31–37)
MECH RR (OHS): 16 B/MIN
MECH VT (OHS): 400 ML
MODE (OHS): AC
NOTIFIED (OHS): ABNORMAL
NOTIFIED BY (OHS): ABNORMAL
NOTIFIED TIME (OHS): 2352
NRBC BLD AUTO-RTO: 0.3 % (ref 0–1)
PCO2 BLDA: 39.4 MMHG (ref 35–45)
PEEP (OHS): 5 CMH2O
PH BLDA: 7.11 [PH] (ref 7.35–7.45)
PLATELET # BLD AUTO: 284 X10(3)/MCL (ref 140–371)
PMV BLD AUTO: 10.2 FL (ref 9.4–12.4)
PO2 BLDA: 487 MMHG (ref 80–105)
RBC # BLD AUTO: 2.53 X10(6)/MCL (ref 4–5.1)
SAO2 % BLDA: 100 % (ref 95–100)
WBC # SPEC AUTO: 14.8 X10(3)/MCL (ref 4–11.5)

## 2023-04-12 PROCEDURE — 87635 SARS-COV-2 COVID-19 AMP PRB: CPT | Performed by: FAMILY MEDICINE

## 2023-04-12 PROCEDURE — 85027 COMPLETE CBC AUTOMATED: CPT | Performed by: FAMILY MEDICINE

## 2023-04-12 PROCEDURE — 94761 N-INVAS EAR/PLS OXIMETRY MLT: CPT

## 2023-04-12 PROCEDURE — 80053 COMPREHEN METABOLIC PANEL: CPT | Performed by: FAMILY MEDICINE

## 2023-04-12 PROCEDURE — 85730 THROMBOPLASTIN TIME PARTIAL: CPT | Performed by: FAMILY MEDICINE

## 2023-04-12 PROCEDURE — 25000003 PHARM REV CODE 250

## 2023-04-12 PROCEDURE — 81001 URINALYSIS AUTO W/SCOPE: CPT | Performed by: FAMILY MEDICINE

## 2023-04-12 PROCEDURE — 94002 VENT MGMT INPAT INIT DAY: CPT

## 2023-04-12 PROCEDURE — 84484 ASSAY OF TROPONIN QUANT: CPT | Performed by: FAMILY MEDICINE

## 2023-04-12 PROCEDURE — 82803 BLOOD GASES ANY COMBINATION: CPT

## 2023-04-12 PROCEDURE — 99900035 HC TECH TIME PER 15 MIN (STAT)

## 2023-04-12 PROCEDURE — 87077 CULTURE AEROBIC IDENTIFY: CPT | Performed by: FAMILY MEDICINE

## 2023-04-12 PROCEDURE — 85610 PROTHROMBIN TIME: CPT | Performed by: FAMILY MEDICINE

## 2023-04-12 PROCEDURE — 99291 CRITICAL CARE FIRST HOUR: CPT

## 2023-04-12 PROCEDURE — 83735 ASSAY OF MAGNESIUM: CPT | Performed by: FAMILY MEDICINE

## 2023-04-12 PROCEDURE — 99900026 HC AIRWAY MAINTENANCE (STAT)

## 2023-04-12 PROCEDURE — 85379 FIBRIN DEGRADATION QUANT: CPT | Performed by: FAMILY MEDICINE

## 2023-04-12 PROCEDURE — 36600 WITHDRAWAL OF ARTERIAL BLOOD: CPT

## 2023-04-12 PROCEDURE — 83605 ASSAY OF LACTIC ACID: CPT | Performed by: FAMILY MEDICINE

## 2023-04-12 PROCEDURE — 27000221 HC OXYGEN, UP TO 24 HOURS

## 2023-04-12 PROCEDURE — 27200966 HC CLOSED SUCTION SYSTEM

## 2023-04-12 PROCEDURE — 99292 CRITICAL CARE ADDL 30 MIN: CPT

## 2023-04-12 PROCEDURE — 32551 INSERTION OF CHEST TUBE: CPT | Mod: RT

## 2023-04-12 PROCEDURE — 85025 COMPLETE CBC W/AUTO DIFF WBC: CPT | Performed by: FAMILY MEDICINE

## 2023-04-12 PROCEDURE — 36415 COLL VENOUS BLD VENIPUNCTURE: CPT | Performed by: FAMILY MEDICINE

## 2023-04-12 PROCEDURE — 96365 THER/PROPH/DIAG IV INF INIT: CPT

## 2023-04-12 PROCEDURE — P9612 CATHETERIZE FOR URINE SPEC: HCPCS

## 2023-04-12 PROCEDURE — 83880 ASSAY OF NATRIURETIC PEPTIDE: CPT | Performed by: FAMILY MEDICINE

## 2023-04-12 RX ORDER — NOREPINEPHRINE BITARTRATE/D5W 8 MG/250ML
0-3 PLASTIC BAG, INJECTION (ML) INTRAVENOUS CONTINUOUS
Status: DISCONTINUED | OUTPATIENT
Start: 2023-04-13 | End: 2023-04-16

## 2023-04-12 RX ORDER — SODIUM CHLORIDE 9 MG/ML
1000 INJECTION, SOLUTION INTRAVENOUS
Status: COMPLETED | OUTPATIENT
Start: 2023-04-12 | End: 2023-04-13

## 2023-04-12 RX ORDER — NOREPINEPHRINE BITARTRATE/D5W 8 MG/250ML
PLASTIC BAG, INJECTION (ML) INTRAVENOUS
Status: COMPLETED
Start: 2023-04-12 | End: 2023-04-12

## 2023-04-12 RX ORDER — INDOMETHACIN 25 MG/1
CAPSULE ORAL
Status: COMPLETED
Start: 2023-04-12 | End: 2023-04-13

## 2023-04-12 RX ADMIN — NOREPINEPHRINE BITARTRATE 0.02 MCG/KG/MIN: 8 INJECTION, SOLUTION INTRAVENOUS at 11:04

## 2023-04-13 ENCOUNTER — ANESTHESIA (OUTPATIENT)
Dept: INTENSIVE CARE | Facility: HOSPITAL | Age: 88
DRG: 870 | End: 2023-04-13
Payer: MEDICARE

## 2023-04-13 ENCOUNTER — ANESTHESIA EVENT (OUTPATIENT)
Dept: INTENSIVE CARE | Facility: HOSPITAL | Age: 88
DRG: 870 | End: 2023-04-13
Payer: MEDICARE

## 2023-04-13 PROBLEM — J96.01 ACUTE HYPOXEMIC RESPIRATORY FAILURE: Status: ACTIVE | Noted: 2023-04-13

## 2023-04-13 PROBLEM — I26.99 BILATERAL PULMONARY EMBOLISM: Status: ACTIVE | Noted: 2023-04-13

## 2023-04-13 PROBLEM — N39.0 COMPLICATED UTI (URINARY TRACT INFECTION): Status: ACTIVE | Noted: 2023-04-13

## 2023-04-13 PROBLEM — A41.9 SEVERE SEPSIS WITH SEPTIC SHOCK: Status: ACTIVE | Noted: 2023-04-13

## 2023-04-13 PROBLEM — R65.21 SEVERE SEPSIS WITH SEPTIC SHOCK: Status: ACTIVE | Noted: 2023-04-13

## 2023-04-13 PROBLEM — I46.9 CARDIAC ARREST: Status: ACTIVE | Noted: 2023-04-13

## 2023-04-13 LAB
ABO AND RH: NORMAL
ABS NEUT CALC (OHS): 10.36 X10(3)/MCL (ref 2.1–9.2)
ABS NEUT CALC (OHS): 16.21 X10(3)/MCL (ref 2.1–9.2)
ALBUMIN SERPL-MCNC: 2.8 G/DL (ref 3.4–5)
ALBUMIN SERPL-MCNC: 2.9 G/DL (ref 3.4–5)
ALBUMIN/GLOB SERPL: 0.9 RATIO
ALBUMIN/GLOB SERPL: 0.9 RATIO
ALP SERPL-CCNC: 115 UNIT/L (ref 50–144)
ALP SERPL-CCNC: 85 UNIT/L (ref 50–144)
ALT SERPL-CCNC: 103 UNIT/L (ref 1–45)
ALT SERPL-CCNC: 91 UNIT/L (ref 1–45)
ANION GAP SERPL CALC-SCNC: 13 MEQ/L (ref 2–13)
ANION GAP SERPL CALC-SCNC: 15 MEQ/L (ref 2–13)
ANISOCYTOSIS BLD QL SMEAR: ABNORMAL
ANTIBODY SCREEN: NORMAL
AORTIC VALVE CUSP SEPERATION: 1.07 CM
APPEARANCE UR: ABNORMAL
APTT PPP: 37.7 SECONDS (ref 23–29.4)
ASCENDING AORTA: 1.92 CM
AST SERPL-CCNC: 152 UNIT/L (ref 14–36)
AST SERPL-CCNC: 207 UNIT/L (ref 14–36)
AV INDEX (PROSTH): 0.64
AV MEAN GRADIENT: 4 MMHG
AV PEAK GRADIENT: 9 MMHG
AV VALVE AREA: 1.38 CM2
AV VELOCITY RATIO: 0.53
BACTERIA #/AREA URNS AUTO: ABNORMAL /HPF
BASE EXCESS BLD CALC-SCNC: -2.8 MMOL/L (ref -2–2)
BILIRUB UR QL STRIP.AUTO: NEGATIVE MG/DL
BILIRUBIN DIRECT+TOT PNL SERPL-MCNC: 0.7 MG/DL (ref 0–1)
BILIRUBIN DIRECT+TOT PNL SERPL-MCNC: 1.1 MG/DL (ref 0–1)
BLOOD GAS SAMPLE TYPE (OHS): ABNORMAL
BNP BLD-MCNC: 1040 PG/ML (ref 10–450)
BUN SERPL-MCNC: 15 MG/DL (ref 7–20)
BUN SERPL-MCNC: 19 MG/DL (ref 7–20)
BURR CELLS (OLG): ABNORMAL
CALCIUM SERPL-MCNC: 8.3 MG/DL (ref 8.4–10.2)
CALCIUM SERPL-MCNC: 8.4 MG/DL (ref 8.4–10.2)
CHLORIDE SERPL-SCNC: 108 MMOL/L (ref 98–110)
CHLORIDE SERPL-SCNC: 112 MMOL/L (ref 98–110)
CK MB SERPL-MCNC: 26.6 NG/ML (ref 0–3.38)
CK SERPL-CCNC: 687 U/L (ref 30–135)
CO2 SERPL-SCNC: 13 MMOL/L (ref 21–32)
CO2 SERPL-SCNC: 22 MMOL/L (ref 21–32)
COHGB MFR BLDA: 1.2 % (ref 0–1.5)
COLOR UR AUTO: YELLOW
CREAT SERPL-MCNC: 0.75 MG/DL (ref 0.66–1.25)
CREAT SERPL-MCNC: 0.82 MG/DL (ref 0.66–1.25)
CREAT/UREA NIT SERPL: 18 (ref 12–20)
CREAT/UREA NIT SERPL: 25 (ref 12–20)
CV ECHO LV RWT: 0.34 CM
D DIMER PPP IA.FEU-MCNC: >15 MG/L (ref 0.19–0.5)
DOP CALC AO PEAK VEL: 1.48 M/S
DOP CALC AO VTI: 22.8 CM
DOP CALC LVOT AREA: 2.1 CM2
DOP CALC LVOT DIAMETER: 1.65 CM
DOP CALC LVOT PEAK VEL: 0.78 M/S
DOP CALC LVOT STROKE VOLUME: 31.42 CM3
DOP CALCLVOT PEAK VEL VTI: 14.7 CM
E WAVE DECELERATION TIME: 247 MSEC
E/A RATIO: 0.79
E/E' RATIO: 8.71 M/S
ECHO LV POSTERIOR WALL: 0.69 CM (ref 0.6–1.1)
EJECTION FRACTION: 63 %
ERYTHROCYTE [DISTWIDTH] IN BLOOD BY AUTOMATED COUNT: 13.7 % (ref 11–14.5)
FIO2 BLOOD GAS (OHS): 35 %
FRACTIONAL SHORTENING: 34 % (ref 28–44)
GFR SERPLBLD CREATININE-BSD FMLA CKD-EPI: 70 MLS/MIN/1.73/M2
GFR SERPLBLD CREATININE-BSD FMLA CKD-EPI: 78 MLS/MIN/1.73/M2
GLOBULIN SER-MCNC: 3.2 GM/DL (ref 2–3.9)
GLOBULIN SER-MCNC: 3.4 GM/DL (ref 2–3.9)
GLUCOSE SERPL-MCNC: 349 MG/DL (ref 70–115)
GLUCOSE SERPL-MCNC: 356 MG/DL (ref 70–115)
GLUCOSE UR QL STRIP.AUTO: 100 MG/DL
HCO3 BLDA-SCNC: 22.1 MMOL/L (ref 22–26)
HCT VFR BLD AUTO: 29 % (ref 36–48)
HGB BLD-MCNC: 9.1 G/DL (ref 11.8–16)
IMM GRANULOCYTES # BLD AUTO: 0.13 X10(3)/MCL (ref 0–0.03)
IMM GRANULOCYTES NFR BLD AUTO: 0.7 % (ref 0–0.5)
INR BLD: 1.22
INTERVENTRICULAR SEPTUM: 0.9 CM (ref 0.6–1.1)
IP (OHS): 0 CMH2O
IVC DIAMETER: 2.08 CM
KETONES UR QL STRIP.AUTO: NEGATIVE MG/DL
LACTATE SERPL-SCNC: 1.8 MMOL/L (ref 0.4–2)
LACTATE SERPL-SCNC: 2.3 MMOL/L (ref 0.4–2)
LACTATE SERPL-SCNC: 5.4 MMOL/L (ref 0.4–2)
LACTATE SERPL-SCNC: 5.8 MMOL/L (ref 0.4–2)
LACTATE SERPL-SCNC: 6.1 MMOL/L (ref 0.4–2)
LACTATE SERPL-SCNC: 6.2 MMOL/L (ref 0.4–2)
LACTATE SERPL-SCNC: 6.4 MMOL/L (ref 0.4–2)
LACTATE SERPL-SCNC: 7.3 MMOL/L (ref 0.4–2)
LACTATE SERPL-SCNC: 9.6 MMOL/L (ref 0.4–2)
LEFT ATRIUM SIZE: 2.27 CM
LEFT ATRIUM VOLUME INDEX MOD: 12.9 ML/M2
LEFT ATRIUM VOLUME MOD: 20.8 CM3
LEFT INTERNAL DIMENSION IN SYSTOLE: 2.67 CM (ref 2.1–4)
LEFT VENTRICLE DIASTOLIC VOLUME INDEX: 44.53 ML/M2
LEFT VENTRICLE DIASTOLIC VOLUME: 71.7 ML
LEFT VENTRICLE MASS INDEX: 59 G/M2
LEFT VENTRICLE SYSTOLIC VOLUME INDEX: 16.3 ML/M2
LEFT VENTRICLE SYSTOLIC VOLUME: 26.3 ML
LEFT VENTRICULAR INTERNAL DIMENSION IN DIASTOLE: 4.04 CM (ref 3.5–6)
LEFT VENTRICULAR MASS: 94.21 G
LEUKOCYTE ESTERASE UR QL STRIP.AUTO: ABNORMAL UNIT/L
LV LATERAL E/E' RATIO: 8.71 M/S
LV SEPTAL E/E' RATIO: 8.71 M/S
LVOT MG: 1.2 MMHG
LVOT MV: 0.51 CM/S
LYMPHOCYTES NFR BLD MANUAL: 0.77 X10(3)/MCL
LYMPHOCYTES NFR BLD MANUAL: 27 % (ref 13–40)
LYMPHOCYTES NFR BLD MANUAL: 4 % (ref 13–40)
LYMPHOCYTES NFR BLD MANUAL: 4 X10(3)/MCL
MACROCYTES BLD QL SMEAR: ABNORMAL
MAGNESIUM SERPL-MCNC: 2.3 MG/DL (ref 1.8–2.4)
MCH RBC QN AUTO: 30.3 PG (ref 27–34)
MCV RBC AUTO: 96.7 FL (ref 79–99)
MEAN CELL HEMOGLOBIN CONCENTRATION (OHS) G/DL: 31.4 G/DL (ref 31–37)
MECH RR (OHS): 16 B/MIN
MECH VT (OHS): 400 ML
MODE (OHS): AC
MONOCYTES NFR BLD MANUAL: 0.44 X10(3)/MCL (ref 0.1–1.3)
MONOCYTES NFR BLD MANUAL: 12 % (ref 2–11)
MONOCYTES NFR BLD MANUAL: 2.32 X10(3)/MCL (ref 0.1–1.3)
MONOCYTES NFR BLD MANUAL: 3 % (ref 2–11)
MV PEAK A VEL: 0.77 M/S
MV PEAK E VEL: 0.61 M/S
NEUTROPHILS NFR BLD MANUAL: 62 % (ref 47–80)
NEUTROPHILS NFR BLD MANUAL: 80 % (ref 47–80)
NEUTS BAND NFR BLD MANUAL: 4 % (ref 0–11)
NEUTS BAND NFR BLD MANUAL: 8 % (ref 0–11)
NITRITE UR QL STRIP.AUTO: NEGATIVE
NOTIFIED (OHS): ABNORMAL
NOTIFIED BY (OHS): ABNORMAL
PCO2 BLDA: 33.8 MMHG (ref 35–45)
PEEP (OHS): 5 CMH2O
PH BLDA: 7.41 [PH] (ref 7.35–7.45)
PH UR STRIP.AUTO: 6 [PH]
PHOSPHATE SERPL-MCNC: 5.1 MG/DL (ref 2.5–4.9)
PIP (OHS): 0 CMH20
PISA TR MAX VEL: 2.86 M/S
PLATELET # BLD AUTO: 303 X10(3)/MCL (ref 140–371)
PLATELET # BLD EST: NORMAL 10*3/UL
PLATELET # BLD EST: NORMAL 10*3/UL
PMV BLD AUTO: 9.6 FL (ref 9.4–12.4)
PO2 BLDA: 124 MMHG (ref 80–105)
POCT GLUCOSE: 128 MG/DL (ref 70–110)
POCT GLUCOSE: 274 MG/DL (ref 70–110)
POCT GLUCOSE: 369 MG/DL (ref 70–110)
POIKILOCYTOSIS BLD QL SMEAR: ABNORMAL
POTASSIUM SERPL-SCNC: 3.6 MMOL/L (ref 3.5–5.1)
POTASSIUM SERPL-SCNC: 3.9 MMOL/L (ref 3.5–5.1)
PROT SERPL-MCNC: 6 GM/DL (ref 6.3–8.2)
PROT SERPL-MCNC: 6.3 GM/DL (ref 6.3–8.2)
PROT UR QL STRIP.AUTO: 100 MG/DL
PROTHROMBIN TIME: 12.3 SECONDS (ref 9.3–11.9)
RBC # BLD AUTO: 3 X10(6)/MCL (ref 4–5.1)
RBC #/AREA URNS AUTO: ABNORMAL /HPF
RBC MORPH BLD: ABNORMAL
RBC MORPH BLD: NORMAL
RBC UR QL AUTO: ABNORMAL UNIT/L
SAO2 % BLDA: 99.8 % (ref 95–100)
SARS-COV-2 RDRP RESP QL NAA+PROBE: NEGATIVE
SODIUM SERPL-SCNC: 140 MMOL/L (ref 135–145)
SODIUM SERPL-SCNC: 143 MMOL/L (ref 135–145)
SP GR UR STRIP.AUTO: 1.02
SPECIMEN OUTDATE: NORMAL
SQUAMOUS #/AREA URNS AUTO: ABNORMAL /HPF
TDI LATERAL: 0.07 M/S
TDI SEPTAL: 0.07 M/S
TDI: 0.07 M/S
TR MAX PG: 33 MMHG
TRICUSPID ANNULAR PLANE SYSTOLIC EXCURSION: 1.38 CM
TROPONIN I SERPL-MCNC: 0.3 NG/ML (ref 0–0.03)
TROPONIN I SERPL-MCNC: 1.54 NG/ML (ref 0–0.03)
TROPONIN I SERPL-MCNC: 2.06 NG/ML (ref 0–0.03)
UROBILINOGEN UR STRIP-ACNC: 1 MG/DL
WBC # SPEC AUTO: 19.3 X10(3)/MCL (ref 4–11.5)
WBC #/AREA URNS AUTO: >100 /HPF

## 2023-04-13 PROCEDURE — 99900035 HC TECH TIME PER 15 MIN (STAT)

## 2023-04-13 PROCEDURE — 82553 CREATINE MB FRACTION: CPT | Performed by: FAMILY MEDICINE

## 2023-04-13 PROCEDURE — 82550 ASSAY OF CK (CPK): CPT | Performed by: FAMILY MEDICINE

## 2023-04-13 PROCEDURE — 84100 ASSAY OF PHOSPHORUS: CPT | Performed by: FAMILY MEDICINE

## 2023-04-13 PROCEDURE — 36415 COLL VENOUS BLD VENIPUNCTURE: CPT | Performed by: INTERNAL MEDICINE

## 2023-04-13 PROCEDURE — 94003 VENT MGMT INPAT SUBQ DAY: CPT

## 2023-04-13 PROCEDURE — 82803 BLOOD GASES ANY COMBINATION: CPT

## 2023-04-13 PROCEDURE — 27000221 HC OXYGEN, UP TO 24 HOURS

## 2023-04-13 PROCEDURE — 25000003 PHARM REV CODE 250: Performed by: SURGERY

## 2023-04-13 PROCEDURE — 80053 COMPREHEN METABOLIC PANEL: CPT | Performed by: INTERNAL MEDICINE

## 2023-04-13 PROCEDURE — 27200966 HC CLOSED SUCTION SYSTEM

## 2023-04-13 PROCEDURE — 20000000 HC ICU ROOM

## 2023-04-13 PROCEDURE — 96366 THER/PROPH/DIAG IV INF ADDON: CPT

## 2023-04-13 PROCEDURE — 25000003 PHARM REV CODE 250

## 2023-04-13 PROCEDURE — 87070 CULTURE OTHR SPECIMN AEROBIC: CPT | Performed by: FAMILY MEDICINE

## 2023-04-13 PROCEDURE — 25000003 PHARM REV CODE 250: Performed by: FAMILY MEDICINE

## 2023-04-13 PROCEDURE — 87040 BLOOD CULTURE FOR BACTERIA: CPT | Performed by: FAMILY MEDICINE

## 2023-04-13 PROCEDURE — 63600175 PHARM REV CODE 636 W HCPCS: Performed by: FAMILY MEDICINE

## 2023-04-13 PROCEDURE — 25500020 PHARM REV CODE 255: Performed by: FAMILY MEDICINE

## 2023-04-13 PROCEDURE — 96375 TX/PRO/DX INJ NEW DRUG ADDON: CPT

## 2023-04-13 PROCEDURE — 36620 INSERTION CATHETER ARTERY: CPT

## 2023-04-13 PROCEDURE — 36415 COLL VENOUS BLD VENIPUNCTURE: CPT | Performed by: FAMILY MEDICINE

## 2023-04-13 PROCEDURE — 96372 THER/PROPH/DIAG INJ SC/IM: CPT | Performed by: FAMILY MEDICINE

## 2023-04-13 PROCEDURE — 86900 BLOOD TYPING SEROLOGIC ABO: CPT | Performed by: FAMILY MEDICINE

## 2023-04-13 PROCEDURE — 36600 WITHDRAWAL OF ARTERIAL BLOOD: CPT

## 2023-04-13 PROCEDURE — 85025 COMPLETE CBC W/AUTO DIFF WBC: CPT | Performed by: INTERNAL MEDICINE

## 2023-04-13 PROCEDURE — 84484 ASSAY OF TROPONIN QUANT: CPT | Performed by: FAMILY MEDICINE

## 2023-04-13 PROCEDURE — C9113 INJ PANTOPRAZOLE SODIUM, VIA: HCPCS | Performed by: FAMILY MEDICINE

## 2023-04-13 PROCEDURE — 99900026 HC AIRWAY MAINTENANCE (STAT)

## 2023-04-13 PROCEDURE — 25000003 PHARM REV CODE 250: Performed by: INTERNAL MEDICINE

## 2023-04-13 PROCEDURE — 97161 PT EVAL LOW COMPLEX 20 MIN: CPT

## 2023-04-13 PROCEDURE — 83605 ASSAY OF LACTIC ACID: CPT | Performed by: FAMILY MEDICINE

## 2023-04-13 PROCEDURE — 96367 TX/PROPH/DG ADDL SEQ IV INF: CPT

## 2023-04-13 PROCEDURE — 84484 ASSAY OF TROPONIN QUANT: CPT | Performed by: INTERNAL MEDICINE

## 2023-04-13 PROCEDURE — 31720 CLEARANCE OF AIRWAYS: CPT

## 2023-04-13 PROCEDURE — 85027 COMPLETE CBC AUTOMATED: CPT | Performed by: INTERNAL MEDICINE

## 2023-04-13 PROCEDURE — 94761 N-INVAS EAR/PLS OXIMETRY MLT: CPT

## 2023-04-13 RX ORDER — LIDOCAINE HYDROCHLORIDE 10 MG/ML
5 INJECTION INFILTRATION; PERINEURAL
Status: DISCONTINUED | OUTPATIENT
Start: 2023-04-13 | End: 2023-04-13

## 2023-04-13 RX ORDER — ENOXAPARIN SODIUM 100 MG/ML
50 INJECTION SUBCUTANEOUS EVERY 24 HOURS
Status: DISCONTINUED | OUTPATIENT
Start: 2023-04-13 | End: 2023-04-13 | Stop reason: SDUPTHER

## 2023-04-13 RX ORDER — SODIUM CHLORIDE 9 MG/ML
INJECTION, SOLUTION INTRAVENOUS CONTINUOUS
Status: DISCONTINUED | OUTPATIENT
Start: 2023-04-13 | End: 2023-04-17

## 2023-04-13 RX ORDER — INDOMETHACIN 25 MG/1
CAPSULE ORAL
Status: COMPLETED
Start: 2023-04-13 | End: 2023-04-13

## 2023-04-13 RX ORDER — GLUCAGON 1 MG
1 KIT INJECTION
Status: DISCONTINUED | OUTPATIENT
Start: 2023-04-13 | End: 2023-04-14

## 2023-04-13 RX ORDER — FAMOTIDINE 10 MG/ML
20 INJECTION INTRAVENOUS DAILY
Status: DISCONTINUED | OUTPATIENT
Start: 2023-04-13 | End: 2023-04-17 | Stop reason: HOSPADM

## 2023-04-13 RX ORDER — SODIUM CHLORIDE 0.9 % (FLUSH) 0.9 %
10 SYRINGE (ML) INJECTION
Status: DISCONTINUED | OUTPATIENT
Start: 2023-04-13 | End: 2023-04-17 | Stop reason: HOSPADM

## 2023-04-13 RX ORDER — ONDANSETRON 2 MG/ML
4 INJECTION INTRAMUSCULAR; INTRAVENOUS EVERY 8 HOURS PRN
Status: DISCONTINUED | OUTPATIENT
Start: 2023-04-13 | End: 2023-04-17 | Stop reason: HOSPADM

## 2023-04-13 RX ORDER — MUPIROCIN 20 MG/G
OINTMENT TOPICAL 2 TIMES DAILY
Status: DISCONTINUED | OUTPATIENT
Start: 2023-04-13 | End: 2023-04-17 | Stop reason: HOSPADM

## 2023-04-13 RX ORDER — GLUCAGON 1 MG
1 KIT INJECTION
Status: DISCONTINUED | OUTPATIENT
Start: 2023-04-13 | End: 2023-04-17

## 2023-04-13 RX ORDER — INDOMETHACIN 25 MG/1
50 CAPSULE ORAL
Status: COMPLETED | OUTPATIENT
Start: 2023-04-13 | End: 2023-04-13

## 2023-04-13 RX ORDER — SODIUM CHLORIDE, SODIUM LACTATE, POTASSIUM CHLORIDE, CALCIUM CHLORIDE 600; 310; 30; 20 MG/100ML; MG/100ML; MG/100ML; MG/100ML
1000 INJECTION, SOLUTION INTRAVENOUS CONTINUOUS
Status: DISCONTINUED | OUTPATIENT
Start: 2023-04-13 | End: 2023-04-13

## 2023-04-13 RX ORDER — ENOXAPARIN SODIUM 100 MG/ML
50 INJECTION SUBCUTANEOUS
Status: COMPLETED | OUTPATIENT
Start: 2023-04-13 | End: 2023-04-13

## 2023-04-13 RX ORDER — ENOXAPARIN SODIUM 100 MG/ML
50 INJECTION SUBCUTANEOUS
Status: DISCONTINUED | OUTPATIENT
Start: 2023-04-13 | End: 2023-04-17 | Stop reason: HOSPADM

## 2023-04-13 RX ORDER — PROPOFOL 10 MG/ML
0-60 INJECTION, EMULSION INTRAVENOUS CONTINUOUS
Status: DISCONTINUED | OUTPATIENT
Start: 2023-04-13 | End: 2023-04-16

## 2023-04-13 RX ORDER — PANTOPRAZOLE SODIUM 40 MG/10ML
40 INJECTION, POWDER, LYOPHILIZED, FOR SOLUTION INTRAVENOUS DAILY
Status: COMPLETED | OUTPATIENT
Start: 2023-04-13 | End: 2023-04-17

## 2023-04-13 RX ORDER — INSULIN ASPART 100 [IU]/ML
1-10 INJECTION, SOLUTION INTRAVENOUS; SUBCUTANEOUS EVERY 6 HOURS PRN
Status: DISCONTINUED | OUTPATIENT
Start: 2023-04-13 | End: 2023-04-17

## 2023-04-13 RX ORDER — INSULIN ASPART 100 [IU]/ML
1-10 INJECTION, SOLUTION INTRAVENOUS; SUBCUTANEOUS EVERY 6 HOURS PRN
Status: DISCONTINUED | OUTPATIENT
Start: 2023-04-13 | End: 2023-04-14

## 2023-04-13 RX ADMIN — PIPERACILLIN AND TAZOBACTAM 4.5 G: 4; .5 INJECTION, POWDER, FOR SOLUTION INTRAVENOUS; PARENTERAL at 07:04

## 2023-04-13 RX ADMIN — PROPOFOL 5 MCG/KG/MIN: 10 INJECTION, EMULSION INTRAVENOUS at 08:04

## 2023-04-13 RX ADMIN — LIDOCAINE HYDROCHLORIDE AND EPINEPHRINE 3 ML: 15; 5 INJECTION, SOLUTION EPIDURAL; INFILTRATION; INTRACAUDAL; PERINEURAL at 01:04

## 2023-04-13 RX ADMIN — IOPAMIDOL 76 ML: 755 INJECTION, SOLUTION INTRAVENOUS at 01:04

## 2023-04-13 RX ADMIN — INSULIN ASPART 6 UNITS: 100 INJECTION, SOLUTION INTRAVENOUS; SUBCUTANEOUS at 05:04

## 2023-04-13 RX ADMIN — FAMOTIDINE 20 MG: 10 INJECTION INTRAVENOUS at 08:04

## 2023-04-13 RX ADMIN — CEFTRIAXONE SODIUM 1 G: 1 INJECTION, POWDER, FOR SOLUTION INTRAMUSCULAR; INTRAVENOUS at 01:04

## 2023-04-13 RX ADMIN — SODIUM CHLORIDE, POTASSIUM CHLORIDE, SODIUM LACTATE AND CALCIUM CHLORIDE 1000 ML: 600; 310; 30; 20 INJECTION, SOLUTION INTRAVENOUS at 12:04

## 2023-04-13 RX ADMIN — SODIUM BICARBONATE 100 MEQ: 84 INJECTION, SOLUTION INTRAVENOUS at 12:04

## 2023-04-13 RX ADMIN — ENOXAPARIN SODIUM 50 MG: 60 INJECTION SUBCUTANEOUS at 02:04

## 2023-04-13 RX ADMIN — SODIUM CHLORIDE: 9 INJECTION, SOLUTION INTRAVENOUS at 11:04

## 2023-04-13 RX ADMIN — INSULIN ASPART 10 UNITS: 100 INJECTION, SOLUTION INTRAVENOUS; SUBCUTANEOUS at 11:04

## 2023-04-13 RX ADMIN — NOREPINEPHRINE BITARTRATE 0.3 MCG/KG/MIN: 8 INJECTION, SOLUTION INTRAVENOUS at 08:04

## 2023-04-13 RX ADMIN — PIPERACILLIN AND TAZOBACTAM 4.5 G: 4; .5 INJECTION, POWDER, FOR SOLUTION INTRAVENOUS; PARENTERAL at 03:04

## 2023-04-13 RX ADMIN — MUPIROCIN: 20 OINTMENT TOPICAL at 08:04

## 2023-04-13 RX ADMIN — PIPERACILLIN AND TAZOBACTAM 4.5 G: 4; .5 INJECTION, POWDER, FOR SOLUTION INTRAVENOUS; PARENTERAL at 10:04

## 2023-04-13 RX ADMIN — PIPERACILLIN AND TAZOBACTAM 4.5 G: 4; .5 INJECTION, POWDER, FOR SOLUTION INTRAVENOUS; PARENTERAL at 02:04

## 2023-04-13 RX ADMIN — PROPOFOL 30 MCG/KG/MIN: 10 INJECTION, EMULSION INTRAVENOUS at 04:04

## 2023-04-13 RX ADMIN — NOREPINEPHRINE BITARTRATE 0.14 MCG/KG/MIN: 8 INJECTION, SOLUTION INTRAVENOUS at 06:04

## 2023-04-13 RX ADMIN — SODIUM CHLORIDE: 9 INJECTION, SOLUTION INTRAVENOUS at 08:04

## 2023-04-13 RX ADMIN — SODIUM BICARBONATE 50 MEQ: 84 INJECTION, SOLUTION INTRAVENOUS at 12:04

## 2023-04-13 RX ADMIN — ENOXAPARIN SODIUM 50 MG: 60 INJECTION SUBCUTANEOUS at 08:04

## 2023-04-13 RX ADMIN — SODIUM CHLORIDE 1000 ML: 9 INJECTION, SOLUTION INTRAVENOUS at 12:04

## 2023-04-13 RX ADMIN — PANTOPRAZOLE SODIUM 40 MG: 40 INJECTION, POWDER, FOR SOLUTION INTRAVENOUS at 09:04

## 2023-04-13 RX ADMIN — SODIUM CHLORIDE: 9 INJECTION, SOLUTION INTRAVENOUS at 04:04

## 2023-04-13 NOTE — PROGRESS NOTES
Pharmacist Renal Dose Adjustment Note    Lin Flowers is a 87 y.o. female being treated with the medication famotidine    Patient Data:    Vital Signs (Most Recent):  Temp: 97 °F (36.1 °C) (04/13/23 0010)  Pulse: 86 (04/13/23 0316)  Resp: (!) 40 (04/13/23 0316)  BP: 109/63 (04/13/23 0316)  SpO2: 100 % (04/13/23 0316)   Vital Signs (72h Range):  Temp:  [97 °F (36.1 °C)]   Pulse:  []   Resp:  [16-40]   BP: ()/(36-69)   SpO2:  [99 %-100 %]      Recent Labs   Lab 04/12/23 2346   CREATININE 0.82     Serum creatinine: 0.82 mg/dL 04/12/23 2346  Estimated creatinine clearance: 37.8 mL/min    Medication:famotidine dose: 20mg frequency bid will be changed to medication:famotidine dose:20mg frequency:daily    Pharmacist's Name: Stefan Whittington

## 2023-04-13 NOTE — ANESTHESIA PROCEDURE NOTES
Arterial    Diagnosis: pneumothorax    Patient location during procedure: ICU  Procedure start time: 4/13/2023 1:31 PM  Timeout: 4/13/2023 1:30 PM  Procedure end time: 4/13/2023 1:53 PM    Staffing  Authorizing Provider: Percy Jenkins CRNA  Performing Provider: Percy Jenkins CRNA      Preanesthetic Checklist  Completed: patient identified, IV checked, site marked, risks and benefits discussed, surgical consent, monitors and equipment checked, pre-op evaluation, timeout performed and anesthesia consent givenArterial  Skin Prep: chlorhexidine gluconate and isopropyl alcohol  Local Infiltration: none  Orientation: left  Location: radial    Catheter Size: 20 G  Catheter placement by Anatomical landmarks. Heme positive aspiration all ports. Insertion Attempts: 3  Assessment  Dressing: secured with tape and tegaderm  Patient: Tolerated well

## 2023-04-13 NOTE — PT/OT/SLP EVAL
Physical Therapy Evaluation    Patient Name:  Lin Flowers   MRN:  [id number]    Recommendations:     Discharge Recommendations: home, rehabilitation facility, nursing facility, skilled   Discharge Equipment Recommendations: none   Barriers to discharge: None    Assessment:     Lin Flowers is a 87 y.o. female admitted with a medical diagnosis of Severe sepsis with septic shock.  She presents with the following impairments/functional limitations: weakness, impaired functional mobility, decreased upper extremity function, decreased lower extremity function Patient on ventilator.    Rehab Prognosis: Good and Fair; patient would benefit from acute skilled PT services to address these deficits and reach maximum level of function.    Recent Surgery: * No surgery found *      Plan:     During this hospitalization, patient to be seen daily to address the identified rehab impairments via gait training, therapeutic activities, therapeutic exercises and progress toward the following goals:    Plan of Care Expires:  05/13/23    Subjective     Chief Complaint: weakness  Patient/Family Comments/goals: to get stronger  Pain/Comfort:  Pain Rating 1: other (see comments) (unable to rate)    Patients cultural, spiritual, Sikhism conflicts given the current situation:      Living Environment:  Lives at home    Prior to admission, patients level of function was independent per granddaughter.  Equipment used at home: nebulizer.  DME owned (not currently used): none.  Upon discharge, patient will have assistance from family.    Objective:     Communicated with nursing prior to session.  Patient found supine with peripheral IV, arterial line, garcia catheter, NG tube, chest tube, ventilator, pulse ox (continuous), blood pressure cuff, telemetry  upon PT entry to room.    General Precautions: Standard, fall  Orthopedic Precautions:N/A   Braces: N/A  Respiratory Status: Ventilator    Exams:  RUE ROM: WFL except shoulder  flexion  LUE ROM: WFL  RLE ROM: WFL  LLE ROM: WFL    Functional Mobility:  Unable to test      AM-PAC 6 CLICK MOBILITY  Total Score:6       Treatment & Education:  PROM provided by therapist to all extremities in multiple planes.     Patient left supine with all lines intact, call button in reach, and family present.    GOALS:   Multidisciplinary Problems       Physical Therapy Goals          Problem: Physical Therapy    Goal Priority Disciplines Outcome Goal Variances Interventions   Physical Therapy Goal     PT, PT/OT Ongoing, Progressing     Description: Goals to be met by: discharge     Patient will increase functional independence with mobility by performin. Supine to sit with Stand-by Assistance  2. Sit to stand transfer with Stand-by Assistance                         History:     Past Medical History:   Diagnosis Date    Alzheimer's disease, unspecified (CODE)     Asthma     High cholesterol     Kidney stones        History reviewed. No pertinent surgical history.    Time Tracking:     PT Received On: 23  PT Start Time: 1445     PT Stop Time: 1500  PT Total Time (min): 15 min     Billable Minutes: Evaluation 15      2023

## 2023-04-13 NOTE — HPI
86 yo female with hx of Dyslipidemia, Dementia, DM, who has not been feeling well reportedly in bed x 1 week.  She lives alone at home, her family was at her home on the evening of 04/12/2023 and pt had witnessed arrest.  Family called 911, did not immediately start CPR, First responders started CPR and pt reportedly intuabated on the scene.   On EMS arrival Pt in cardiorespiratory arrest, intubated continued on CPR another 15 mins, started on levophed in ED, Chest CTA shows bilateral Pes,  Rt sided multiple rib fractures and PTX. Pt unresponsive not on any sedatives, intubated family not at bedside. Reported Full code for now. Details of event from ED and pt family via telephone.  Pt intubated and sedated unable to answer any questions.  Pt CXR shows multiple rib fractures consistent with effective CPR and small pneumothorax.  Chest tube placed by Dr. Roca in ER and CXR pneumothorax at 10%.       Patient Instructions:     COMPRESSION HOSE:      Your provider has recommended compression hose as part of your treatment plan.  Compression hose will help alleviate swelling of the lower extremities, in cases such as after surgery, after severe trauma or when you have decreased blood flow to your lower extremities which will result in your legs swelling.     There are two strengths of compression hose we may recommend:     10 to 20mmHg - this strength is over the counter and does not need a doctor's order to purchase.  You may obtain these at your local pharmacies or big box stores or even [employer]. You will need to take measurements of the widest part of your calf and the length of your leg from the knee to the ankle to get the correct size.  Dr Sanchez recommends the brand CopperFit or Lace Poet for an over the counter brand which can be found on Amazon.      20-30mmHg - this strength does require an order from your provider and is supplied by a [employer] company.  We will place an order for you through our [facility] who will determine if your insurance can be billed for the hose or not (some insurance plans will cover the hose and some will not and our facility is NOT contracted with all plans) and will make arrangements to get the hose to you.     Measurements for compression garments should be done when the limbs are the least swollen so that the garment can provide resistance to further swelling. To help reduce swelling please stay off your feet as much as possible the night before you plan to be measured.  If necessary you can elevate your legs for 30 to 60 minutes piror to driving to the clinic.  Please wear comfortable, loose fitting clothing so that the limbs can be accurately measured.        Please contact our office at [phone] if you have any questions.                   Follow Up:  Please make a follow up appointment with Dr Chris Sanchez DPM   for 3 months      Please call or return to the clinic as needed if these symptoms worsen fail to improve as anticipated, or if new symptoms develop.        Please note: 24 hour notice for cancellation of appointment is required.    Any questions you may have regarding FMLA or Disability paperwork status, please contact the Disability Department directly at: [phone]    Your insurance may require a referral for these services. IT IS THE PATIENT'S RESPONSIBILITY TO OBTAIN A REFERRAL PRIOR TO SERVICES BEING RENDERED. Charges for service obtained without a referral are the patient's responsibility. To ensure an approved referral/authorization is in place prior to service, you may call your physicians office or the Utilization Management Department at [phone], seventy-two (72) hours after the order is placed. Referrals can not be entered retrospectively.        IF YOU HAVE PPO INSURANCE COVERAGE    If your medical insurance is a PPO, it is the patient's responsibility to confirm that any provider, vendor and/or facility outside of [facility] is in your network.         Although your PPO insurance may not require referrals, there are certain procedures (MRI, Nuclear Stress Test, Surgery, etc.) that may require an authorization from your insurance company. As long as the service is being performed at [facility], the [facility] staff will obtain the necessary authorization on your behalf.         IF YOU HAVE HMO INSURANCE COVERAGE    Your insurance requires a referral from your Primary Care Physician. [facility] will obtain authorization from your insurance company for services ordered by your [facility] PCP or [facility] specialist.        **A referral is not a guarantee of benefit, and we highly recommend that you call your insurance company to verify your coverage**              You may receive a survey in the mail, or via the e-mail address that you have provided.  We would appreciate if you could fill out the  survey and provide us with any feedback on your experience regarding your visit today. Thank you for allowing us to provide you with your health care needs.     Do not hesitate to call if you are experiencing severe pain, worsening or change in your pain, have symptoms of infection (fever, warmth, redness, increased drainage), or have any other problem that concerns you ~ [phone] (or [phone] after hours).    Please remember when requesting refills on pain medication that the request should be made by Thursday at the latest. [facility] is open Monday-Friday, 8am-5pm, and closed on the weekends.  No narcotic refills will be filled after hours.    Additional Educational Resources:  For additional resources regarding your symptoms, diagnosis, or further health information, please visit the Health Resources section on [email or web address] or the Online Health Resources section in [employer].

## 2023-04-13 NOTE — PROCEDURES
"Lin Flowers is a 87 y.o. female patient.  With pneumothorax secondary to chest compressions following code at home.  Patient was presenting to the emergency department requiring mechanical ventilation and findings of saddle embolus from DVT.    Temp: 96.3 °F (35.7 °C) (04/13/23 0830)  Pulse: 99 (04/13/23 1323)  Resp: (!) 26 (04/13/23 1323)  BP: (!) 113/52 (04/13/23 1323)  SpO2: 99 % (04/13/23 1323)  Weight: 56 kg (123 lb 7.3 oz) (04/13/23 0912)  Height: 5' 5" (165.1 cm) (04/13/23 0830)       Procedures  Chest tube thoracostomy    Procedure in detail:  Patient was placed on mechanical ventilation due to respiratory decompensation following chest compressions for acute cardiac arrest.  Patient developing traumatic rib fractures with pneumothorax.  Patient is in need of emergent chest tube placement within the right chest cavity for pneumothorax.  Patient is at increased risk of tension pneumothorax due to positive pressure airway ventilation needed.    Emergent verbal consents were provided by the patient's son Luis Antonio Flowers.    After appropriately donning sterile gown and gloves patient's right chest was sterilely prepped and draped in normal surgical fashion using Betadine.  1% lidocaine with epinephrine infiltrate the subcutaneous tissues overlying the 4th intercostal space in the mid axillary line in the right chest wall.  11 blade was used to incise the skin with dissection down the fatty tissues of the chest wall.  Using a 7 Armenian catheter cannulation was performed of the chest wall without difficulty.  There was immediate rush of air and the chest tube was placed to closed suctioned through Pneumovax canister.  She would appropriate re-expansion of the lung.  And patient remained stable throughout the entirety of the procedure.      Complications:  None   Estimated blood loss:  None   Specimens:  None  Disposition:  Upon recovery med stabilization patient will be transferred to the ICU.    4/13/2023    Leonor" KODY Roca MD

## 2023-04-13 NOTE — PLAN OF CARE
DR. PINEDO AT BESIDE FOR CHEST TUBE REMOVAL AND RE-INSERTION. SURGERY NURSES X2 AT BEDSIDE. [facility] CHANGED TO Q 5 MIN WILL CONTINUE TO MONITOR. LESLI, JEREMIAH AT BEDSIDE FOR A-LINE INSERTION AFTERWARDS.

## 2023-04-13 NOTE — ED PROVIDER NOTES
Encounter Date: 4/12/2023       History     Chief Complaint   Patient presents with    Post cardiac arrest      Arrives via EMS [facility] with witnessed post cardiac arrest, pt reported to be restless all day, cpr performed on scene for apprx 14 mins per EMS, Epi 4mg given IV, pulse returned following.  mL bolus admin, ET tube size 7, 22 at the lip. No HX of cardiac issues per EMS     HPI  Review of patient's allergies indicates:  No Known Allergies  Past Medical History:   Diagnosis Date    Alzheimer's disease, unspecified (CODE)     Asthma     Kidney stones      No past surgical history on file.  No family history on file.  Social History     Tobacco Use    Smoking status: Never    Smokeless tobacco: Never   Substance Use Topics    Alcohol use: Never    Drug use: Never     Review of Systems    Physical Exam     Initial Vitals   BP Pulse Resp Temp SpO2   04/12/23 2344 04/12/23 2328 04/12/23 2333 04/13/23 0010 04/12/23 2333   (!) 64/42 (!) 144 (!) 28 97 °F (36.1 °C) 100 %      MAP       --                Physical Exam    ED Course   Procedures  Labs Reviewed   COMPREHENSIVE METABOLIC PANEL - Abnormal; Notable for the following components:       Result Value    Chloride 112 (*)     Carbon Dioxide 13 (*)     Glucose Level 356 (*)     Albumin Level 2.9 (*)     Alanine Aminotransferase 91 (*)     Aspartate Aminotransferase 152 (*)     Anion Gap 15.0 (*)     All other components within normal limits   NT-PRO NATRIURETIC PEPTIDE - Abnormal; Notable for the following components:    ProBNP 1,040 (*)     All other components within normal limits   TROPONIN I - Abnormal; Notable for the following components:    Troponin-I 0.305 (*)     All other components within normal limits   D DIMER, QUANTITATIVE - Abnormal; Notable for the following components:    D-Dimer >15.00 (*)     All other components within normal limits   LACTIC ACID, PLASMA - Abnormal; Notable for the following components:    Lactic Acid Level 9.6 (*)      All other components within normal limits   URINALYSIS - Abnormal; Notable for the following components:    Appearance, UA Cloudy (*)     Protein,  (*)     Glucose,  (*)     Blood, UA Large (*)     Leukocyte Esterase, UA Moderate (*)     All other components within normal limits    Narrative:      URINE STABILITY IS 2 HOURS AT ROOM TEMP OR    SIX HOURS REFRIGERATED. PERFORMING TESTING ON    SPECIMENS GREATER THAN THIS AGE MAY AFFECT THE    FOLLOWING TESTS:    PH          SPECIFIC GRAVITY           BLOOD    CLARITY     BILIRUBIN               UROBILINOGEN   BLOOD GAS - Abnormal; Notable for the following components:    pH, Blood gas 7.109 (*)     pO2, Blood gas 487.0 (*)     HCO3, Blood gas 12.2 (*)     Base Excess, Blood gas -15.90 (*)     All other components within normal limits    Narrative:     HCO3 12.2   CBC WITH DIFFERENTIAL - Abnormal; Notable for the following components:    WBC 14.8 (*)     RBC 2.53 (*)     Hgb 7.9 (*)     Hct 25.7 (*)     .6 (*)     MCHC 30.7 (*)     IG# 0.90 (*)     IG% 6.1 (*)     All other components within normal limits   PROTIME-INR - Abnormal; Notable for the following components:    PT 12.3 (*)     All other components within normal limits    Narrative:     Protimes are used to monitor anticoagulant agents such as warfarin. PT INR values are based on the current patient normal mean and the RAISSA value for the specific instrument reagent used.  **Routine theraputic target values for the INR are 2.0-3.0**   APTT - Abnormal; Notable for the following components:    PTT 37.7 (*)     All other components within normal limits   MANUAL DIFFERENTIAL - Abnormal; Notable for the following components:    Abs Neut calc 10.36 (*)     RBC Morph Abnormal (*)     Anisocyte 1+ (*)     Poik 1+ (*)     Macrocyte 1+ (*)     [place] Cells 1+ (*)     All other components within normal limits   URINALYSIS, MICROSCOPIC - Abnormal; Notable for the following components:    Bacteria, UA Trace  (*)     WBC, UA >100 (*)     Squamous Epithelial Cells, UA Few (*)     All other components within normal limits    Narrative:     Microscopic performed on unspun urine due to QNS [place] 12:51 AM    LACTIC ACID, PLASMA - Abnormal; Notable for the following components:    Lactic Acid Level 7.3 (*)     All other components within normal limits   LACTIC ACID, PLASMA - Abnormal; Notable for the following components:    Lactic Acid Level 5.8 (*)     All other components within normal limits   TROPONIN I - Abnormal; Notable for the following components:    Troponin-I 2.060 (*)     All other components within normal limits   COMPREHENSIVE METABOLIC PANEL - Abnormal; Notable for the following components:    Glucose Level 349 (*)     Calcium Level Total 8.3 (*)     Protein Total 6.0 (*)     Albumin Level 2.8 (*)     Bilirubin Total 1.1 (*)     Alanine Aminotransferase 103 (*)     Aspartate Aminotransferase 207 (*)     BUN/Creatinine Ratio 25 (*)     All other components within normal limits   BLOOD GAS - Abnormal; Notable for the following components:    pCO2, Blood gas 33.8 (*)     pO2, Blood gas 124.0 (*)     Base Excess, Blood gas -2.80 (*)     All other components within normal limits   CBC WITH DIFFERENTIAL - Abnormal; Notable for the following components:    WBC 19.3 (*)     RBC 3.00 (*)     Hgb 9.1 (*)     Hct 29.0 (*)     IG# 0.13 (*)     IG% 0.7 (*)     All other components within normal limits   MANUAL DIFFERENTIAL - Abnormal; Notable for the following components:    Lymph Man 4 (*)     Monocyte Man 12 (*)     Abs Neut calc 16.212 (*)     Abs Mono 2.316 (*)     All other components within normal limits   SARS-COV-2 RNA AMPLIFICATION, QUAL - Normal   MAGNESIUM - Normal   RESPIRATORY CULTURE (OLG)   CULTURE, URINE   CBC W/ AUTO DIFFERENTIAL    Narrative:     The following orders were created for panel order CBC auto differential.  Procedure                               Abnormality         Status                      ---------                               -----------         ------                     CBC with Differential[[id number]]        Abnormal            Final result               Manual Differential[[id number]]          Abnormal            Final result                 Please view results for these tests on the individual orders.   CBC W/ AUTO DIFFERENTIAL    Narrative:     The following orders were created for panel order CBC auto differential.  Procedure                               Abnormality         Status                     ---------                               -----------         ------                     CBC with Differential[[id number]]        Abnormal            Final result               Manual Differential[[id number]]          Abnormal            Final result                 Please view results for these tests on the individual orders.   POCT GLUCOSE MONITORING CONTINUOUS          Imaging Results              X-Ray Chest AP Portable (Final result)  Result time 04/13/23 06:52:35      Final result by Mary Carmen Shore III, MD (04/13/23 06:52:35)                   Impression:      1. There is continued progression of the patient's right apical pneumothorax and right-sided subcutaneous emphysema as described above.  See above comments for details.      Electronically signed by: Mary Carmen Shore  Date:    04/13/2023  Time:    06:52               Narrative:    EXAMINATION:  STUDY: XR CHEST AP PORTABLE    CLINICAL HISTORY AND TECHNIQUE:  Bharath Miranda RT on 4/13/2023  6:21 AM    CURRENT CLINICAL HISTORY: ER PT    FOLLOW UP  CHEST TUBE PLACEMENT    PTA  CARDIAC ARREST     RT SIDED PNEUMOTHORAX    RT SIDED CHEST TUBE PLACEMENT   PT ON VENT   LOOKED AT BY DR PINEDO    PMH: ASTHMA ALZHEIMER'S DISEASE  KIDNEY STONE    TECHNIQUE:1V  PORTABLE CHEST    TECHNOLOGIST: RW    COMPARISON:  Chest x-ray obtained approximately 1-1/2 hours earlier    FINDINGS:  The position of the patient's small gauge right-sided chest tube and  endotracheal 2 has not changed appreciably.  Since the prior examination there is continued expansion of the right apical pneumothorax (estimated to be approximately 10% volume on the current exam) and continued progression of the subcutaneous emphysema within the right chest wall.  The cardiac, hilar, and mediastinal contours appear unremarkable.I see no lobar or segmental infiltrates.No significant pleural effusions are noted.There is moderate demineralization of the skeletal structures with mild degenerative changes noted involving the thoracic spine.                                       X-Ray Chest AP Portable (Final result)  Result time 04/13/23 04:46:54      Final result by Mary Carmen Shore III, MD (04/13/23 04:46:54)                   Impression:      1. A small gauge, right-sided chest tube is present with the distal tip located within the region of the right hilum.  2. The previously noted right apical pneumothorax has expanded slightly since prior examination (estimated volume of approximately 5%) and the previously noted right-sided subcutaneous emphysema has progressed considerably.      Electronically signed by: Mary Carmen Shore  Date:    04/13/2023  Time:    04:46               Narrative:    EXAMINATION:  STUDY: XR CHEST AP PORTABLE    CLINICAL HISTORY AND TECHNIQUE:  Bharath Miranda RT on 4/13/2023  4:43 AM    CURRENT CLINICAL HISTORY: ER PT    PTA  CARDIAC ARREST     RT SIDED PNEUMOTHORAX    RT SIDED CHEST TUBE PLACEMENT   PT ON VENT   LOOKED AT BY DR PINEDO    PMH: ASTHMA ALZHEIMER'S DISEASE  KIDNEY STONE    TECHNIQUE:1V  PORTABLE CHEST    TECHNOLOGIST: ANNAMARIE    COMPARISON:  None    FINDINGS:  The distal tip of the patient's endotracheal tube is approximately 4 cm above the fernanda.  A small gauge, right-sided chest tube is present with the distal tip located within the region of the right hilum.  The previously noted right apical pneumothorax appears to have expanded slightly with an estimated volume  approximately 5 % on the current exam.  The previously noted right axillary subcutaneous emphysema has also progressed considerably extending more inferiorly along the right lateral chest wall and into the right supraclavicular region.The cardiac, hilar, and mediastinal contours appear unremarkable.I see no lobar or segmental infiltrates.No significant pleural effusions are noted.There is moderate demineralization of the skeletal structures with mild degenerative changes noted involving thoracic spine.                                       CTA Chest Non-Coronary (PE Studies) (Final result)  Result time 04/13/23 06:51:43      Final result by Mariano Raygoza MD (04/13/23 06:51:43)                   Impression:      1.  Extensive bilateral pulmonary embolization with resultant right heart strain as described above    2.  Right anterior lateral rib fractures with underlying pneumothorax and probable pulmonary contusion  3.  Thoracolumbar spondylosis    NOTE: Agree with preliminary report.  Primary physician was notified at the time of the reading.    All CT scans at [this location] are performed using dose modulation techniques as appropriate to a performed exam including the following:  Automated exposure control; adjustment of the mA and/or kV according to patient size (this includes techniques or standardized protocols for targeted exams where dose is matched to indication / reason for exam; i.e. extremities or head); use of iterative reconstruction technique.    Finalized on: 4/13/2023 6:51 AM By:  Mariano Raygoza MD  [facility]# [id number]      2023-04-13 06:53:45.644    [facility]               Narrative:    EXAM: CTA CHEST NON CORONARY (PE STUDIES)    CLINICAL INDICATION: Suspect pulmonary embolism    TECHNIQUE: Axial and multiplanar 2-D reformations provided.  With IV contrast and a CTA technique    PRIORS: Chest radiograph    FINDINGS:  1.  Bilateral pulmonary emboli are present in both upper lobes and involving the right lower  lobe with evidence of right heart strain    2.  Right anterior lateral rib fractures from the third to the seventh ribs with extensive subcutaneous emphysematous changes  5.  Thoracolumbar spondylosis without evidence of acute vertebral body fracture  3.  Right pneumothorax  4.  Patchy airspace disease in the right lung diffusely                                         CT Head Without Contrast (Final result)  Result time 04/13/23 06:47:10      Final result by Mariano Raygoza MD (04/13/23 06:47:10)                   Impression:      1.  No evidence for acute intracranial event    2.  Senescent changes as described (agree with preliminary)    All CT scans at [this location] are performed using dose modulation techniques as appropriate to a performed exam including the following:  Automated exposure control; adjustment of the mA and/or kV according to patient size (this includes techniques or standardized protocols for targeted exams where dose is matched to indication / reason for exam; i.e. extremities or head); use of iterative reconstruction technique.    Finalized on: 4/13/2023 6:47 AM By:  Mariano Raygoza MD  [facility]# [id number]      2023-04-13 06:49:15.440    [facility]               Narrative:    EXAM: CT HEAD WITHOUT CONTRAST    CLINICAL INDICATION: Cardiac arrest    TECHNIQUE: Axial and multiplanar 2-D reformations provided.  Without IV contrast    PRIORS: None    FINDINGS: There is no evidence for acute intracranial hemorrhage, mass nor midline shift.  CSF-containing spaces are increased in size consistent with symmetrical global atrophy.  Deep white matter periventricular changes are mild.  Paranasal sinuses, orbits and mastoids are all clear.                                         X-Ray Chest AP Portable (Final result)  Result time 04/13/23 04:32:53      Final result by Mary Carmen Shore III, MD (04/13/23 04:32:53)                   Impression:      1. The distal tip of the patient's endotracheal tube is approximately 3-4 cm  above the fernanda.  2. Moderate subcutaneous emphysema is noted within the region of the right axilla and a small (less than 5% volume) right apical pneumothorax is present.  See above comments.The emergency room physician was notified of this finding at the time of this interpretation (approximately 04:30 on 04/13/2023 via telephone).    COMMUNICATION  This critical result was discovered/received at 04:30 on 04/13/2023.  The critical information above was relayed directly by me by telephone to Dr. Quintero on 04/13/2023 at 04:30.      Electronically signed by: Mary Carmen Shore  Date:    04/13/2023  Time:    04:32               Narrative:    EXAMINATION:  STUDY: XR CHEST AP PORTABLE    CLINICAL HISTORY AND TECHNIQUE:  Bharath Miranda RT on 4/13/2023 12:06 AM    CURRENT CLINICAL HISTORY: ER PT    PTA  CARDIAC ARREST     ET TUBE PLACEMENT    PMH: ASTHMA ALZHEIMER'S DISEASE  KIDNEY STONE    TECHNIQUE:1V  PORTABLE CHEST    TECHNOLOGIST: ANNAMARIE    COMPARISON:  03/03/2023    FINDINGS:  The image is overpenetrated and less than optimal.  The distal tip of the patient's endotracheal tube is 3-4 cm above the fernanda.  A small (less than 5% volume) right apical pneumothorax is present.  A moderate amount of subcutaneous emphysema is noted within the region of the right axilla.  Skin folds overlie the mid and lower right lung.  The cardiac, hilar, and mediastinal contours appear unremarkable.I see no lobar or segmental infiltrates.No significant pleural effusions are noted.There is moderate demineralization of the skeletal structures with mild degenerative changes noted involving the thoracic spine.                                       Medications   NORepinephrine 8 mg in dextrose 5% 250 mL infusion (0.3 mcg/kg/min × 50.8 kg Intravenous Rate/Dose Change 4/13/23 0130)   famotidine (PF) injection 20 mg (has no administration in time range)   enoxaparin injection 50 mg (has no administration in time range)   glucagon (human recombinant)  injection 1 mg (has no administration in time range)   dextrose 10% bolus 125 mL 125 mL (has no administration in time range)   dextrose 10% bolus 250 mL 250 mL (has no administration in time range)   insulin aspart U-100 pen 1-10 Units (has no administration in time range)   0.9%  NaCl infusion (has no administration in time range)   piperacillin-tazobactam (ZOSYN) 4.5 g in dextrose 5 % in water (D5W) 5 % 100 mL IVPB (MB+) (has no administration in time range)   0.9%  NaCl infusion (0 mLs Intravenous Stopped 4/13/23 0100)   sodium bicarbonate solution 50 mEq (50 mEq Intravenous Given 4/13/23 0020)   lactated ringers bolus 1,000 mL (0 mLs Intravenous Stopped 4/13/23 0200)   cefTRIAXone (ROCEPHIN) 1 g in dextrose 5 % in water (D5W) 5 % 50 mL IVPB (MB+) (0 g Intravenous Stopped 4/13/23 0252)   iopamidoL (ISOVUE-370) injection 100 mL (76 mLs Intravenous Given 4/13/23 0133)   enoxaparin injection 50 mg (50 mg Subcutaneous Given 4/13/23 0218)   piperacillin-tazobactam (ZOSYN) 4.5 g in dextrose 5 % in water (D5W) 5 % 100 mL IVPB (MB+) (0 g Intravenous Stopped 4/13/23 0345)     Medical Decision Making:   Status post cardiac arrest, on ventilator  Dr. Roca came to see patient in the ED, reviewed films, once repeat chest x-ray in 30 minutes                        Clinical Impression:   Final diagnoses:  [R06.02] SOB (shortness of breath)  [I46.9] Cardiac arrest (Primary)  [R09.2] Respiratory arrest  [I26.99] Bilateral pulmonary embolism  [S22.41XA] Closed fracture of multiple ribs of right side, initial encounter  [J93.83] Pneumothorax, acute  [E11.65] Type 2 diabetes mellitus with hyperglycemia, without long-term current use of insulin  [Z46.82] Encounter for chest tube placement  [J93.9] Pneumothorax        ED Disposition Condition    Admit Stable                García Encarnacion MD  04/13/23 0656

## 2023-04-13 NOTE — H&P
[facility] Hospital Medicine  History & Physical    Patient Name: Lin Flowers  MRN: [id number]  Patient Class: Emergency  Admission Date: 4/12/2023  Attending Physician: Angela Agee MD  Primary Care Provider: Nilton Ndiaye MD         Patient information was obtained from via telemed    Subjective:     Principal Problem:<principal problem not specified>    Chief Complaint:   Chief Complaint   Patient presents with    Post cardiac arrest      Arrives via EMS [facility] with witnessed post cardiac arrest, pt reported to be restless all day, cpr performed on scene for apprx 14 mins per EMS, Epi 4mg given IV, pulse returned following.  mL bolus admin, ET tube size 7, 22 at the lip. No HX of cardiac issues per EMS        HPI: 88 yo female with hx of Dyslipidemia, Dementia, ? DM, who has not been feeling well all day stopped breathing family started on CPR, Fire Dept was called, On EMS arrival Pt in cardiorespiratory arrest, intubated continued on CPR another 15 mins, started on levophed in ED, Chest CTA, bilateral Pes,  Rt sided multiple rib fractures and PTX. Pt unresponsive not on any sedatives, intubated family not at bedside. Reported Full code for now. Details of event from ED.        Past Medical History:   Diagnosis Date    Alzheimer's disease, unspecified (CODE)     Asthma     Kidney stones        No past surgical history on file.    Review of patient's allergies indicates:  No Known Allergies    No current facility-administered medications on file prior to encounter.     Current Outpatient Medications on File Prior to Encounter   Medication Sig    donepeziL (ARICEPT) 10 MG tablet Take 10 mg by mouth every evening.    montelukast (SINGULAIR) 10 mg tablet Take 10 mg by mouth once daily.    multivitamin capsule Take 1 capsule by mouth once daily.    pantoprazole (PROTONIX) 40 MG tablet Take 40 mg by mouth once daily.    SYMBICORT 160-4.5 mcg/actuation HFAA 2 puffs  2 (two) times daily.    vitamin E 100 UNIT capsule Take 100 Units by mouth once daily.     Family History    None       Tobacco Use    Smoking status: Never    Smokeless tobacco: Never   Substance and Sexual Activity    Alcohol use: Never    Drug use: Never    Sexual activity: Not on file     Review of Systems   Unable to perform ROS: Patient unresponsive   Objective:     Vital Signs (Most Recent):  Temp: 97 °F (36.1 °C) (04/13/23 0010)  Pulse: 86 (04/13/23 0316)  Resp: (!) 40 (04/13/23 0316)  BP: 109/63 (04/13/23 0316)  SpO2: 100 % (04/13/23 0316)   Vital Signs (24h Range):  Temp:  [97 °F (36.1 °C)] 97 °F (36.1 °C)  Pulse:  [] 86  Resp:  [16-40] 40  SpO2:  [99 %-100 %] 100 %  BP: ()/(36-69) 109/63     Weight: 50.8 kg (112 lb)  Body mass index is 21.16 kg/m².    Physical Exam  Constitutional:       Appearance: Normal appearance.   HENT:      Head: Normocephalic and atraumatic.   Eyes:      Comments: Pupils fixed and constricted   Cardiovascular:      Rate and Rhythm: Regular rhythm. Tachycardia present.      Pulses: Normal pulses.      Heart sounds: Normal heart sounds.   Pulmonary:      Comments: Bilateral rales   Abdominal:      General: Bowel sounds are normal.      Palpations: Abdomen is soft.   Musculoskeletal:      Cervical back: Neck supple.   Skin:     General: Skin is warm and dry.   Neurological:      Comments: Unresponsive and not following command, GCS 3   Psychiatric:         Mood and Affect: Mood normal.           Significant Labs: All pertinent labs within the past 24 hours have been reviewed.  A1C: No results for input(s): HGBA1C in the last 4320 hours.  ABGs: No results for input(s): PH, PCO2, HCO3, POCSATURATED, BE, TOTALHB, COHB, METHB, O2HB, POCFIO2, PO2 in the last 48 hours.  Bilirubin:   Recent Labs   Lab 04/12/23 2346   BILITOT 0.7     Blood Culture: No results for input(s): LABBLOO in the last 48 hours.  BMP:   Recent Labs   Lab 04/12/23 2346      K 3.9   CO2 13*    BUN 15.0   CREATININE 0.82   CALCIUM 8.4   MG 2.30     CBC:   Recent Labs   Lab 04/12/23 2346   WBC 14.8*   HGB 7.9*   HCT 25.7*        CMP:   Recent Labs   Lab 04/12/23 2346      K 3.9   CO2 13*   BUN 15.0   CREATININE 0.82   CALCIUM 8.4   ALBUMIN 2.9*   BILITOT 0.7   ALKPHOS 85   *   ALT 91*     Cardiac Markers: No results for input(s): CKMB, MYOGLOBIN, BNP, TROPISTAT in the last 48 hours.  Coagulation:   Recent Labs   Lab 04/12/23 2346   INR 1.22     Lactic Acid: No results for input(s): LACTATE in the last 48 hours.  Lipase: No results for input(s): LIPASE in the last 48 hours.  Lipid Panel: No results for input(s): CHOL, HDL, LDLCALC, TRIG, CHOLHDL in the last 48 hours.  Magnesium:   Recent Labs   Lab 04/12/23 2346   MG 2.30     Pathology Results  (Last 10 years)      None          POCT Glucose: No results for input(s): POCTGLUCOSE in the last 48 hours.  Prealbumin: No results for input(s): PREALBUMIN in the last 48 hours.  Respiratory Culture: No results for input(s): GSRESP, RESPIRATORYC in the last 48 hours.  Troponin:   Recent Labs   Lab 04/12/23 2346   TROPONINI 0.305*     TSH:   Recent Labs   Lab 03/04/23  0510   TSH 0.390     Urine Culture: No results for input(s): LABURIN in the last 48 hours.  Urine Studies:   Recent Labs   Lab 04/12/23 2358   APPEARANCEUA Cloudy*   PROTEINUA 100*   BILIRUBINUA Negative   UROBILINOGEN 1.0   LEUKOCYTESUR Moderate*   RBCUA 0-2   WBCUA >100*   BACTERIA Trace*       Significant Imaging:    Chest CTA Bilateral Upper and lower lobe Pes, RT sided PTX and ribs fractures    Assessment/Plan:     No notes have been filed under this hospital service.  Service: Hospital Medicine    VTE Risk Mitigation (From admission, onward)    None         - Outside of hospital Cardiopulmonary arrest intubated, likely due to multiple bilateal Pes, and possibly septic shock due to Complicated UTI. Continue supportive care    -Bilateral PE start on Lovenox, check  Echocardiogram    - Septic shock likely due to complicated UTI, Bcx, Ucx, Continue on IVF, Levophed. Needs Central Line, Start on Zosyn. Discuss with ED attendoing    -Acute metabolic encephalopathy, unknown duration and gap from pt being unresponsive and initiation of CPR, unknown degree of anoxic brain injury, with history dementia     - RT sided pneumothorax Going to get Chest tubem Surgery consult for Chest tube management    - Lactic acidosis repeat serial [place]    - DM check Hgb AC, accu checks, and cover with SSI    - GI prophylaxis    - DVT ppx on Lovenox    CCT 45 mins  Prognosis is poor, will continue medical treatment, if no improvement will consider and talk to family possibly palliative care    Full code, SDM is her son Luis Antonio Flowers  Pt is seen and examined by me via telemed. Pt is in [facility]. I am in [place], [place]. Nursing staff assisted with patient evaluation. Software is Audio/ Video.   Pt is being admitted as an inpatient to the Hospitalist service to evaluate and treat            Angela Agee MD  Department of Hospital Medicine  [facility]

## 2023-04-13 NOTE — SUBJECTIVE & OBJECTIVE
Past Medical History:   Diagnosis Date    Alzheimer's disease, unspecified (CODE)     Asthma     High cholesterol     Kidney stones        History reviewed. No pertinent surgical history.    Review of patient's allergies indicates:  No Known Allergies    No current facility-administered medications on file prior to encounter.     Current Outpatient Medications on File Prior to Encounter   Medication Sig    donepeziL (ARICEPT) 10 MG tablet Take 10 mg by mouth every evening.    montelukast (SINGULAIR) 10 mg tablet Take 10 mg by mouth once daily.    multivitamin capsule Take 1 capsule by mouth once daily.    pantoprazole (PROTONIX) 40 MG tablet Take 40 mg by mouth once daily.    SYMBICORT 160-4.5 mcg/actuation HFAA 2 puffs 2 (two) times daily.    vitamin E 100 UNIT capsule Take 100 Units by mouth once daily.     Family History    None       Tobacco Use    Smoking status: Never    Smokeless tobacco: Never   Substance and Sexual Activity    Alcohol use: Never    Drug use: Never    Sexual activity: Not on file     Review of Systems   Unable to perform ROS: Intubated   Objective:     Vital Signs (Most Recent):  Temp: 97 °F (36.1 °C) (04/13/23 0010)  Pulse: 95 (04/13/23 0832)  Resp: (!) 29 (04/13/23 0832)  BP: 124/79 (04/13/23 0700)  SpO2: 99 % (04/13/23 0832)   Vital Signs (24h Range):  Temp:  [97 °F (36.1 °C)] 97 °F (36.1 °C)  Pulse:  [] 95  Resp:  [16-40] 29  SpO2:  [99 %-100 %] 99 %  BP: ()/(36-86) 124/79     Weight: 50.8 kg (112 lb)  Body mass index is 21.16 kg/m².    Physical Exam  Vitals and nursing note reviewed. Exam conducted with a chaperone present.   Constitutional:       Comments: Intubated and sedated   HENT:      Head: Normocephalic and atraumatic.      Comments: ET tube, OGT in place     Nose: Nose normal.   Eyes:      General: No scleral icterus.     Conjunctiva/sclera: Conjunctivae normal.   Neck:      Vascular: No carotid bruit.   Cardiovascular:      Rate and Rhythm: Normal rate and regular  rhythm.      Pulses: Normal pulses.      Heart sounds: Normal heart sounds.   Pulmonary:      Effort: Pulmonary effort is normal.      Breath sounds: Normal breath sounds. No wheezing or rales.      Comments: Diminished RUL otherwise clear  Abdominal:      General: Bowel sounds are normal.      Palpations: Abdomen is soft.   Musculoskeletal:      Cervical back: Neck supple.   Lymphadenopathy:      Cervical: No cervical adenopathy.   Skin:     General: Skin is warm and dry.      Findings: No erythema, lesion or rash.   Neurological:      Comments: Intubated and sedated   Psychiatric:      Comments: Unable to assess           Significant Labs: All pertinent labs within the past 24 hours have been reviewed.  CBC:   Recent Labs   Lab 04/12/23 2346 04/13/23  0409   WBC 14.8* 19.3*   HGB 7.9* 9.1*   HCT 25.7* 29.0*    303     CMP:   Recent Labs   Lab 04/12/23 2346 04/13/23  0409    143   K 3.9 3.6   CO2 13* 22   BUN 15.0 19.0   CREATININE 0.82 0.75   CALCIUM 8.4 8.3*   ALBUMIN 2.9* 2.8*   BILITOT 0.7 1.1*   ALKPHOS 85 115   * 207*   ALT 91* 103*       Significant Imaging: I have reviewed all pertinent imaging results/findings within the past 24 hours.

## 2023-04-13 NOTE — ED NOTES
"Notified son José Manuel RUIZ at this time to verify pt's code status, and son states "yes, do everything to keep her alive for tonight. I will call tomorrow with a decision" as family members were labile in decision making of code status. Phone number for José Manuel Flowers [phone]  "

## 2023-04-13 NOTE — H&P
[facility] Hospital Medicine  History & Physical    Patient Name: Lin Flowers  MRN: [id number]  Patient Class: IP- Inpatient  Admission Date: 4/12/2023  Attending Physician: Judy Aguirre MD   Primary Care Provider: Nilton Ndiaye MD         Patient information was obtained from past medical records, ER records and primary team.     Subjective:     Principal Problem:Severe sepsis with septic shock    Chief Complaint:   Chief Complaint   Patient presents with    Post cardiac arrest      Arrives via EMS [facility] with witnessed post cardiac arrest, pt reported to be restless all day, cpr performed on scene for apprx 14 mins per EMS, Epi 4mg given IV, pulse returned following.  mL bolus admin, ET tube size 7, 22 at the lip. No HX of cardiac issues per EMS        HPI: 88 yo female with hx of Dyslipidemia, Dementia, DM, who has not been feeling well reportedly in bed x 1 week.  She lives alone at home, her family was at her home on the evening of 04/12/2023 and pt had witnessed arrest.  Family called 911, did not immediately start CPR, First responders started CPR and pt reportedly intuabated on the scene.   On EMS arrival Pt in cardiorespiratory arrest, intubated continued on CPR another 15 mins, started on levophed in ED, Chest CTA shows bilateral Pes,  Rt sided multiple rib fractures and PTX. Pt unresponsive not on any sedatives, intubated family not at bedside. Reported Full code for now. Details of event from ED and pt family via telephone.  Pt intubated and sedated unable to answer any questions.  Pt CXR shows multiple rib fractures consistent with effective CPR and small pneumothorax.  Chest tube placed by Dr. Roca in ER and CXR pneumothorax at 10%.          Past Medical History:   Diagnosis Date    Alzheimer's disease, unspecified (CODE)     Asthma     High cholesterol     Kidney stones        History reviewed. No pertinent surgical history.    Review of patient's  allergies indicates:  No Known Allergies    No current facility-administered medications on file prior to encounter.     Current Outpatient Medications on File Prior to Encounter   Medication Sig    donepeziL (ARICEPT) 10 MG tablet Take 10 mg by mouth every evening.    montelukast (SINGULAIR) 10 mg tablet Take 10 mg by mouth once daily.    multivitamin capsule Take 1 capsule by mouth once daily.    pantoprazole (PROTONIX) 40 MG tablet Take 40 mg by mouth once daily.    SYMBICORT 160-4.5 mcg/actuation HFAA 2 puffs 2 (two) times daily.    vitamin E 100 UNIT capsule Take 100 Units by mouth once daily.     Family History    None       Tobacco Use    Smoking status: Never    Smokeless tobacco: Never   Substance and Sexual Activity    Alcohol use: Never    Drug use: Never    Sexual activity: Not on file     Review of Systems   Unable to perform ROS: Intubated   Objective:     Vital Signs (Most Recent):  Temp: 97 °F (36.1 °C) (04/13/23 0010)  Pulse: 95 (04/13/23 0832)  Resp: (!) 29 (04/13/23 0832)  BP: 124/79 (04/13/23 0700)  SpO2: 99 % (04/13/23 0832)   Vital Signs (24h Range):  Temp:  [97 °F (36.1 °C)] 97 °F (36.1 °C)  Pulse:  [] 95  Resp:  [16-40] 29  SpO2:  [99 %-100 %] 99 %  BP: ()/(36-86) 124/79     Weight: 50.8 kg (112 lb)  Body mass index is 21.16 kg/m².    Physical Exam  Vitals and nursing note reviewed. Exam conducted with a chaperone present.   Constitutional:       Comments: Intubated and sedated   HENT:      Head: Normocephalic and atraumatic.      Comments: ET tube, OGT in place     Nose: Nose normal.   Eyes:      General: No scleral icterus.     Conjunctiva/sclera: Conjunctivae normal.   Neck:      Vascular: No carotid bruit.   Cardiovascular:      Rate and Rhythm: Normal rate and regular rhythm.      Pulses: Normal pulses.      Heart sounds: Normal heart sounds.   Pulmonary:      Effort: Pulmonary effort is normal.      Breath sounds: Normal breath sounds. No wheezing or rales.       Comments: Diminished RUL otherwise clear  Abdominal:      General: Bowel sounds are normal.      Palpations: Abdomen is soft.   Musculoskeletal:      Cervical back: Neck supple.   Lymphadenopathy:      Cervical: No cervical adenopathy.   Skin:     General: Skin is warm and dry.      Findings: No erythema, lesion or rash.   Neurological:      Comments: Intubated and sedated   Psychiatric:      Comments: Unable to assess           Significant Labs: All pertinent labs within the past 24 hours have been reviewed.  CBC:   Recent Labs   Lab 04/12/23  2346 04/13/23  0409   WBC 14.8* 19.3*   HGB 7.9* 9.1*   HCT 25.7* 29.0*    303     CMP:   Recent Labs   Lab 04/12/23  2346 04/13/23  0409    143   K 3.9 3.6   CO2 13* 22   BUN 15.0 19.0   CREATININE 0.82 0.75   CALCIUM 8.4 8.3*   ALBUMIN 2.9* 2.8*   BILITOT 0.7 1.1*   ALKPHOS 85 115   * 207*   ALT 91* 103*       Significant Imaging: I have reviewed all pertinent imaging results/findings within the past 24 hours.    Assessment/Plan:     * Severe sepsis with septic shock  This patient does have evidence of infective focus  My overall impression is septic shock due to lactate > 4, MAP < 65 and SBP < 90.  Source: Urinary Tract  Antibiotics given-   Antibiotics (72h ago, onward)    Start     Stop Route Frequency Ordered    04/13/23 0930  mupirocin 2 % ointment         04/18 0859 Nasl 2 times daily 04/13/23 0820    04/13/23 0700  piperacillin-tazobactam (ZOSYN) 4.5 g in dextrose 5 % in water (D5W) 5 % 100 mL IVPB (MB+)         -- IV Every 8 hours (non-standard times) 04/13/23 0620        Latest lactate reviewed-  No results for input(s): LACTATE in the last 72 hours.  Organ dysfunction indicated by Acute kidney injury, Acute respiratory failure and Encephalopathy    Fluid challenge Actual Body weight- Patient will receive 30ml/kg actual body weight to calculate fluid bolus for treatment of septic shock.     Post- resuscitation assessment Yes Perfusion exam was  performed within 6 hours of septic shock presentation after bolus shows Adequate tissue perfusion assessed by non-invasive monitoring       Will Start Pressors- Levophed for MAP of 65  Source control achieved by: levophed  Iv abx zosyn    Acute hypoxemic respiratory failure  Patient with Hypoxic Respiratory failure which is Acute.  she is not on home oxygen. Supplemental oxygen was provided and noted- Vent Mode: A/C  Oxygen Concentration (%):  [] 30  Resp Rate Total:  [19 br/min-32 br/min] 28 br/min  Vt Set:  [400 mL] 400 mL  PEEP/CPAP:  [5 cmH20] 5 cmH20  Mean Airway Pressure:  [7.9 dqC06-11 cmH20] 8.7 cmH20    .   Signs/symptoms of respiratory failure include- tachypnea, increased work of breathing and respiratory distress. Contributing diagnoses includes - Pulmonary Embolus Labs and images were reviewed. Patient Has recent ABG, which has been reviewed. Will treat underlying causes and adjust management of respiratory failure as follows-  contiue vent and will wean as tolerated    Bilateral pulmonary embolism  lovenox  Echo this am  Consult CIS      Cardiac arrest    Pt with witnessed arrest  Will start hypothermia protocol    COmplicated UTI  Continue iv zosyn  Blood cx ordered due to severe sepsis        VTE Risk Mitigation (From admission, onward)         Ordered     enoxaparin injection 50 mg  Every 12 hours (non-standard times)         04/13/23 1781                     Critical care time spent on the evaluation and treatment of severe organ dysfunction, review of pertinent labs and imaging studies, discussions with consulting providers and discussions with patient/family 45 min minutes          Judy Aguirre MD  Department of Hospital Medicine  [facility]

## 2023-04-13 NOTE — ASSESSMENT & PLAN NOTE
Patient with Hypoxic Respiratory failure which is Acute.  she is not on home oxygen. Supplemental oxygen was provided and noted- Vent Mode: A/C  Oxygen Concentration (%):  [] 30  Resp Rate Total:  [19 br/min-32 br/min] 28 br/min  Vt Set:  [400 mL] 400 mL  PEEP/CPAP:  [5 cmH20] 5 cmH20  Mean Airway Pressure:  [7.9 qnD90-90 cmH20] 8.7 cmH20    .   Signs/symptoms of respiratory failure include- tachypnea, increased work of breathing and respiratory distress. Contributing diagnoses includes - Pulmonary Embolus Labs and images were reviewed. Patient Has recent ABG, which has been reviewed. Will treat underlying causes and adjust management of respiratory failure as follows-  contiue vent and will wean as tolerated

## 2023-04-13 NOTE — ED PROVIDER NOTES
Encounter Date: 4/12/2023       History     Chief Complaint   Patient presents with    Post cardiac arrest      Arrives via EMS [facility] with witnessed post cardiac arrest, pt reported to be restless all day, cpr performed on scene for apprx 14 mins per EMS, Epi 4mg given IV, pulse returned following.  mL bolus admin, ET tube size 7, 22 at the lip. No HX of cardiac issues per EMS     87-year-old black female brought to emergency room per [facility] with witnessed post cardiac arrest at home.  Patient reported to be restless all day.  CPR was performed on scene for approximately 14 minutes prior to EMS arriving.  EMS intubated the patient with a 7 Welsh ETT.  Patient was given 4 amps of epinephrine and a fluid bolus with the turn of her pulse.  Patient reportedly with no history of cardiac problems.  Supposedly patient just collapsed at home.  Patient does have a history of dementia.    The history is provided by the EMS personnel.  CPR was performed performed  Review of patient's allergies indicates:  No Known Allergies  Past Medical History:   Diagnosis Date    Alzheimer's disease, unspecified (CODE)     Asthma     High cholesterol     Kidney stones      History reviewed. No pertinent surgical history.  History reviewed. No pertinent family history.  Social History     Tobacco Use    Smoking status: Never    Smokeless tobacco: Never   Substance Use Topics    Alcohol use: Never    Drug use: Never     Review of Systems   Unable to perform ROS: Acuity of condition     Physical Exam     Initial Vitals   BP Pulse Resp Temp SpO2   04/12/23 2344 04/12/23 2328 04/12/23 2333 04/13/23 0010 04/12/23 2333   (!) 64/42 (!) 144 (!) 28 97 °F (36.1 °C) 100 %      MAP       --                Physical Exam    Nursing note and vitals reviewed.  Constitutional:   Elderly black female intubated occasional spontaneous respirations.  Patient with assisted ventilation.   HENT:   Head is atraumatic and normocephalic.  Oropharynx  with ET tube in place.  Membranes appear dry.  Nose is clear with no discharge.   Neck:   Neck is supple with no adenopathy.   Cardiovascular:            Heart are very distant heart sounds with tachycardia.  No murmur appreciated.   Pulmonary/Chest:   Bilateral breath sounds equal with assisted ventilation.   Abdominal:   Abdomen with positive bowel sounds.  Nontender to palpation.   Musculoskeletal:      Comments: Extremities with no spontaneous movement.  No clubbing cyanosis or edema present.     Neurological:   Patient is unresponsive.   Skin:   Skin is warm and dry.       ED Course   Critical Care    Date/Time: 4/12/2023 11:32 PM  Performed by: Derek Quintero MD  Authorized by: Judy Aguirre MD   Direct patient critical care time: 45 minutes  Additional history critical care time: 15 minutes  Ordering / reviewing critical care time: 15 minutes  Documentation critical care time: 15 minutes  Consulting other physicians critical care time: 15 minutes  Consult with family critical care time: 10 minutes  Total critical care time (exclusive of procedural time) : 115 minutes  Critical care was necessary to treat or prevent imminent or life-threatening deterioration of the following conditions: respiratory failure and cardiac failure.  Critical care was time spent personally by me on the following activities: discussions with consultants, evaluation of patient's response to treatment, examination of patient, obtaining history from patient or surrogate, ordering and performing treatments and interventions, ordering and review of laboratory studies, ordering and review of radiographic studies, pulse oximetry, re-evaluation of patient's condition and ventilator management.      Labs Reviewed   COMPREHENSIVE METABOLIC PANEL - Abnormal; Notable for the following components:       Result Value    Chloride 112 (*)     Carbon Dioxide 13 (*)     Glucose Level 356 (*)     Albumin Level 2.9 (*)     Alanine Aminotransferase  91 (*)     Aspartate Aminotransferase 152 (*)     Anion Gap 15.0 (*)     All other components within normal limits   NT-PRO NATRIURETIC PEPTIDE - Abnormal; Notable for the following components:    ProBNP 1,040 (*)     All other components within normal limits   TROPONIN I - Abnormal; Notable for the following components:    Troponin-I 0.305 (*)     All other components within normal limits   D DIMER, QUANTITATIVE - Abnormal; Notable for the following components:    D-Dimer >15.00 (*)     All other components within normal limits   LACTIC ACID, PLASMA - Abnormal; Notable for the following components:    Lactic Acid Level 9.6 (*)     All other components within normal limits   URINALYSIS - Abnormal; Notable for the following components:    Appearance, UA Cloudy (*)     Protein,  (*)     Glucose,  (*)     Blood, UA Large (*)     Leukocyte Esterase, UA Moderate (*)     All other components within normal limits    Narrative:      URINE STABILITY IS 2 HOURS AT ROOM TEMP OR    SIX HOURS REFRIGERATED. PERFORMING TESTING ON    SPECIMENS GREATER THAN THIS AGE MAY AFFECT THE    FOLLOWING TESTS:    PH          SPECIFIC GRAVITY           BLOOD    CLARITY     BILIRUBIN               UROBILINOGEN   BLOOD GAS - Abnormal; Notable for the following components:    pH, Blood gas 7.109 (*)     pO2, Blood gas 487.0 (*)     HCO3, Blood gas 12.2 (*)     Base Excess, Blood gas -15.90 (*)     All other components within normal limits    Narrative:     HCO3 12.2   CBC WITH DIFFERENTIAL - Abnormal; Notable for the following components:    WBC 14.8 (*)     RBC 2.53 (*)     Hgb 7.9 (*)     Hct 25.7 (*)     .6 (*)     MCHC 30.7 (*)     IG# 0.90 (*)     IG% 6.1 (*)     All other components within normal limits   PROTIME-INR - Abnormal; Notable for the following components:    PT 12.3 (*)     All other components within normal limits    Narrative:     Protimes are used to monitor anticoagulant agents such as warfarin. PT INR  values are based on the current patient normal mean and the RAISSA value for the specific instrument reagent used.  **Routine theraputic target values for the INR are 2.0-3.0**   APTT - Abnormal; Notable for the following components:    PTT 37.7 (*)     All other components within normal limits   MANUAL DIFFERENTIAL - Abnormal; Notable for the following components:    Abs Neut calc 10.36 (*)     RBC Morph Abnormal (*)     Anisocyte 1+ (*)     Poik 1+ (*)     Macrocyte 1+ (*)     [place] Cells 1+ (*)     All other components within normal limits   URINALYSIS, MICROSCOPIC - Abnormal; Notable for the following components:    Bacteria, UA Trace (*)     WBC, UA >100 (*)     Squamous Epithelial Cells, UA Few (*)     All other components within normal limits    Narrative:     Microscopic performed on unspun urine due to ESTRELLA Warren Kenney 12:51 AM    LACTIC ACID, PLASMA - Abnormal; Notable for the following components:    Lactic Acid Level 7.3 (*)     All other components within normal limits   LACTIC ACID, PLASMA - Abnormal; Notable for the following components:    Lactic Acid Level 5.8 (*)     All other components within normal limits   TROPONIN I - Abnormal; Notable for the following components:    Troponin-I 2.060 (*)     All other components within normal limits   COMPREHENSIVE METABOLIC PANEL - Abnormal; Notable for the following components:    Glucose Level 349 (*)     Calcium Level Total 8.3 (*)     Protein Total 6.0 (*)     Albumin Level 2.8 (*)     Bilirubin Total 1.1 (*)     Alanine Aminotransferase 103 (*)     Aspartate Aminotransferase 207 (*)     BUN/Creatinine Ratio 25 (*)     All other components within normal limits   BLOOD GAS - Abnormal; Notable for the following components:    pCO2, Blood gas 33.8 (*)     pO2, Blood gas 124.0 (*)     Base Excess, Blood gas -2.80 (*)     All other components within normal limits   CBC WITH DIFFERENTIAL - Abnormal; Notable for the following components:    WBC 19.3 (*)     RBC  3.00 (*)     Hgb 9.1 (*)     Hct 29.0 (*)     IG# 0.13 (*)     IG% 0.7 (*)     All other components within normal limits   MANUAL DIFFERENTIAL - Abnormal; Notable for the following components:    Lymph Man 4 (*)     Monocyte Man 12 (*)     Abs Neut calc 16.212 (*)     Abs Mono 2.316 (*)     All other components within normal limits   SARS-COV-2 RNA AMPLIFICATION, QUAL - Normal   MAGNESIUM - Normal   RESPIRATORY CULTURE (OLG)   CULTURE, URINE   CBC W/ AUTO DIFFERENTIAL    Narrative:     The following orders were created for panel order CBC auto differential.  Procedure                               Abnormality         Status                     ---------                               -----------         ------                     CBC with Differential[[id number]]        Abnormal            Final result               Manual Differential[[id number]]          Abnormal            Final result                 Please view results for these tests on the individual orders.   CBC W/ AUTO DIFFERENTIAL    Narrative:     The following orders were created for panel order CBC auto differential.  Procedure                               Abnormality         Status                     ---------                               -----------         ------                     CBC with Differential[[id number]]        Abnormal            Final result               Manual Differential[[id number]]          Abnormal            Final result                 Please view results for these tests on the individual orders.   POCT GLUCOSE MONITORING CONTINUOUS     EKG Readings: (Independently Interpreted)   Initial Reading: No STEMI. Previous EKG: Compared with most recent EKG Rhythm: Sinus Tachycardia. Heart Rate: 106. Ectopy: No Ectopy. Conduction: RBBB. Axis: Normal. Clinical Impression: Sinus Tachycardia with RBBB     Imaging Results              X-Ray Chest AP Portable (Final result)  Result time 04/13/23 06:52:35      Final result by Mary Carmen Shore III,  MD (04/13/23 06:52:35)                   Impression:      1. There is continued progression of the patient's right apical pneumothorax and right-sided subcutaneous emphysema as described above.  See above comments for details.      Electronically signed by: Mary Carmen Shore  Date:    04/13/2023  Time:    06:52               Narrative:    EXAMINATION:  STUDY: XR CHEST AP PORTABLE    CLINICAL HISTORY AND TECHNIQUE:  Bharath Miranda, RT on 4/13/2023  6:21 AM    CURRENT CLINICAL HISTORY: ER PT    FOLLOW UP  CHEST TUBE PLACEMENT    PTA  CARDIAC ARREST     RT SIDED PNEUMOTHORAX    RT SIDED CHEST TUBE PLACEMENT   PT ON VENT   LOOKED AT BY DR PINEDO    PMH: ASTHMA ALZHEIMER'S DISEASE  KIDNEY STONE    TECHNIQUE:1V  PORTABLE CHEST    TECHNOLOGIST: RW    COMPARISON:  Chest x-ray obtained approximately 1-1/2 hours earlier    FINDINGS:  The position of the patient's small gauge right-sided chest tube and endotracheal 2 has not changed appreciably.  Since the prior examination there is continued expansion of the right apical pneumothorax (estimated to be approximately 10% volume on the current exam) and continued progression of the subcutaneous emphysema within the right chest wall.  The cardiac, hilar, and mediastinal contours appear unremarkable.I see no lobar or segmental infiltrates.No significant pleural effusions are noted.There is moderate demineralization of the skeletal structures with mild degenerative changes noted involving the thoracic spine.                                       X-Ray Chest AP Portable (Final result)  Result time 04/13/23 04:46:54      Final result by Mary Carmen Shore III, MD (04/13/23 04:46:54)                   Impression:      1. A small gauge, right-sided chest tube is present with the distal tip located within the region of the right hilum.  2. The previously noted right apical pneumothorax has expanded slightly since prior examination (estimated volume of approximately 5%) and the previously noted  right-sided subcutaneous emphysema has progressed considerably.      Electronically signed by: Jenleonel Mone  Date:    04/13/2023  Time:    04:46               Narrative:    EXAMINATION:  STUDY: XR CHEST AP PORTABLE    CLINICAL HISTORY AND TECHNIQUE:  Bharath Ruben, RT on 4/13/2023  4:43 AM    CURRENT CLINICAL HISTORY: ER PT    PTA  CARDIAC ARREST     RT SIDED PNEUMOTHORAX    RT SIDED CHEST TUBE PLACEMENT   PT ON VENT   LOOKED AT BY DR PINEDO    PMH: ASTHMA ALZHEIMER'S DISEASE  KIDNEY STONE    TECHNIQUE:1V  PORTABLE CHEST    TECHNOLOGIST: ANNAMARIE    COMPARISON:  None    FINDINGS:  The distal tip of the patient's endotracheal tube is approximately 4 cm above the fernanda.  A small gauge, right-sided chest tube is present with the distal tip located within the region of the right hilum.  The previously noted right apical pneumothorax appears to have expanded slightly with an estimated volume approximately 5 % on the current exam.  The previously noted right axillary subcutaneous emphysema has also progressed considerably extending more inferiorly along the right lateral chest wall and into the right supraclavicular region.The cardiac, hilar, and mediastinal contours appear unremarkable.I see no lobar or segmental infiltrates.No significant pleural effusions are noted.There is moderate demineralization of the skeletal structures with mild degenerative changes noted involving thoracic spine.                                       CTA Chest Non-Coronary (PE Studies) (Final result)  Result time 04/13/23 06:51:43      Final result by Mariano Raygoza MD (04/13/23 06:51:43)                   Impression:      1.  Extensive bilateral pulmonary embolization with resultant right heart strain as described above    2.  Right anterior lateral rib fractures with underlying pneumothorax and probable pulmonary contusion  3.  Thoracolumbar spondylosis    NOTE: Agree with preliminary report.  Primary physician was notified at the time of the  reading.    All CT scans at [this location] are performed using dose modulation techniques as appropriate to a performed exam including the following:  Automated exposure control; adjustment of the mA and/or kV according to patient size (this includes techniques or standardized protocols for targeted exams where dose is matched to indication / reason for exam; i.e. extremities or head); use of iterative reconstruction technique.    Finalized on: 4/13/2023 6:51 AM By:  Mariano Raygoza MD  [facility]# [id number]      2023-04-13 06:53:45.644    [facility]               Narrative:    EXAM: CTA CHEST NON CORONARY (PE STUDIES)    CLINICAL INDICATION: Suspect pulmonary embolism    TECHNIQUE: Axial and multiplanar 2-D reformations provided.  With IV contrast and a CTA technique    PRIORS: Chest radiograph    FINDINGS:  1.  Bilateral pulmonary emboli are present in both upper lobes and involving the right lower lobe with evidence of right heart strain    2.  Right anterior lateral rib fractures from the third to the seventh ribs with extensive subcutaneous emphysematous changes  5.  Thoracolumbar spondylosis without evidence of acute vertebral body fracture  3.  Right pneumothorax  4.  Patchy airspace disease in the right lung diffusely                                         CT Head Without Contrast (Final result)  Result time 04/13/23 06:47:10      Final result by Mariano Raygoza MD (04/13/23 06:47:10)                   Impression:      1.  No evidence for acute intracranial event    2.  Senescent changes as described (agree with preliminary)    All CT scans at [this location] are performed using dose modulation techniques as appropriate to a performed exam including the following:  Automated exposure control; adjustment of the mA and/or kV according to patient size (this includes techniques or standardized protocols for targeted exams where dose is matched to indication / reason for exam; i.e. extremities or head); use of iterative  reconstruction technique.    Finalized on: 4/13/2023 6:47 AM By:  Mariano Raygoza MD  [facility]# [id number]      2023-04-13 06:49:15.440    [facility]               Narrative:    EXAM: CT HEAD WITHOUT CONTRAST    CLINICAL INDICATION: Cardiac arrest    TECHNIQUE: Axial and multiplanar 2-D reformations provided.  Without IV contrast    PRIORS: None    FINDINGS: There is no evidence for acute intracranial hemorrhage, mass nor midline shift.  CSF-containing spaces are increased in size consistent with symmetrical global atrophy.  Deep white matter periventricular changes are mild.  Paranasal sinuses, orbits and mastoids are all clear.                                         X-Ray Chest AP Portable (Final result)  Result time 04/13/23 04:32:53      Final result by Mary Carmen Shore III, MD (04/13/23 04:32:53)                   Impression:      1. The distal tip of the patient's endotracheal tube is approximately 3-4 cm above the fernanda.  2. Moderate subcutaneous emphysema is noted within the region of the right axilla and a small (less than 5% volume) right apical pneumothorax is present.  See above comments.The emergency room physician was notified of this finding at the time of this interpretation (approximately 04:30 on 04/13/2023 via telephone).    COMMUNICATION  This critical result was discovered/received at 04:30 on 04/13/2023.  The critical information above was relayed directly by me by telephone to Dr. Quintero on 04/13/2023 at 04:30.      Electronically signed by: Mary Carmen Shore  Date:    04/13/2023  Time:    04:32               Narrative:    EXAMINATION:  STUDY: XR CHEST AP PORTABLE    CLINICAL HISTORY AND TECHNIQUE:  RT Alvin on 4/13/2023 12:06 AM    CURRENT CLINICAL HISTORY: ER PT    PTA  CARDIAC ARREST     ET TUBE PLACEMENT    PMH: ASTHMA ALZHEIMER'S DISEASE  KIDNEY STONE    TECHNIQUE:1V  PORTABLE CHEST    TECHNOLOGIST: RW    COMPARISON:  03/03/2023    FINDINGS:  The image is overpenetrated and less than optimal.  The  distal tip of the patient's endotracheal tube is 3-4 cm above the fernanda.  A small (less than 5% volume) right apical pneumothorax is present.  A moderate amount of subcutaneous emphysema is noted within the region of the right axilla.  Skin folds overlie the mid and lower right lung.  The cardiac, hilar, and mediastinal contours appear unremarkable.I see no lobar or segmental infiltrates.No significant pleural effusions are noted.There is moderate demineralization of the skeletal structures with mild degenerative changes noted involving the thoracic spine.                                       Medications   NORepinephrine 8 mg in dextrose 5% 250 mL infusion (0 mcg/kg/min × 50.8 kg Intravenous Rate/Dose Change 4/13/23 2310)   famotidine (PF) injection 20 mg (20 mg Intravenous Given 4/13/23 0841)   enoxaparin injection 50 mg (50 mg Subcutaneous Given 4/13/23 2046)   glucagon (human recombinant) injection 1 mg (has no administration in time range)   dextrose 10% bolus 125 mL 125 mL (has no administration in time range)   dextrose 10% bolus 250 mL 250 mL (has no administration in time range)   insulin aspart U-100 pen 1-10 Units (6 Units Subcutaneous Given 4/13/23 1741)   0.9%  NaCl infusion ( Intravenous New Bag 4/13/23 2328)   glucagon (human recombinant) injection 1 mg (has no administration in time range)   insulin aspart U-100 pen 1-10 Units (has no administration in time range)   pantoprazole injection 40 mg (40 mg Intravenous Given 4/13/23 0903)   sodium chloride 0.9% flush 10 mL (has no administration in time range)   ondansetron injection 4 mg (has no administration in time range)   piperacillin-tazobactam (ZOSYN) 4.5 g in dextrose 5 % in water (D5W) 5 % 100 mL IVPB (MB+) (0 g Intravenous Stopped 4/14/23 0255)   mupirocin 2 % ointment ( Nasal Given 4/13/23 2047)   propofol (DIPRIVAN) 10 mg/mL infusion (20 mcg/kg/min × 50.8 kg Intravenous Rate/Dose Change 4/13/23 2310)   0.9%  NaCl infusion (0 mLs Intravenous  Stopped 4/13/23 0100)   sodium bicarbonate solution 50 mEq (50 mEq Intravenous Given 4/13/23 0020)   lactated ringers bolus 1,000 mL (0 mLs Intravenous Stopped 4/13/23 0200)   cefTRIAXone (ROCEPHIN) 1 g in dextrose 5 % in water (D5W) 5 % 50 mL IVPB (MB+) (0 g Intravenous Stopped 4/13/23 0252)   iopamidoL (ISOVUE-370) injection 100 mL (76 mLs Intravenous Given 4/13/23 0133)   enoxaparin injection 50 mg (50 mg Subcutaneous Given 4/13/23 0218)   piperacillin-tazobactam (ZOSYN) 4.5 g in dextrose 5 % in water (D5W) 5 % 100 mL IVPB (MB+) (0 g Intravenous Stopped 4/13/23 0345)   LIDOcaine-EPINEPHrine 1.5%-1:200,000 injection 3 mL (3 mLs Intradermal Given 4/13/23 1300)     Medical Decision Making:   Initial Assessment:   Cardiorespiratory arrest  Clinical Tests:   Lab Tests: Ordered  Radiological Study: Ordered  Medical Tests: Ordered                        Clinical Impression:   Final diagnoses:  [R06.02] SOB (shortness of breath)  [I46.9] Cardiac arrest (Primary)  [R09.2] Respiratory arrest  [I26.99] Bilateral pulmonary embolism  [S22.41XA] Closed fracture of multiple ribs of right side, initial encounter  [J93.83] Pneumothorax, acute  [E11.65] Type 2 diabetes mellitus with hyperglycemia, without long-term current use of insulin  [Z46.82] Encounter for chest tube placement  [J93.9] Pneumothorax        ED Disposition Condition    Admit Stable                Derek Quintero MD  04/14/23 0356

## 2023-04-13 NOTE — PLAN OF CARE
PATIENT ARRIVED TO UNIT FROM ED APPROX. 0830. REVIEW OF SYSTEM COMPLETED. DR. MIRANDA NOTIFIED AND NEW ORDERS NOTED.

## 2023-04-13 NOTE — SUBJECTIVE & OBJECTIVE
Past Medical History:   Diagnosis Date    Alzheimer's disease, unspecified (CODE)     Asthma     Kidney stones        No past surgical history on file.    Review of patient's allergies indicates:  No Known Allergies    No current facility-administered medications on file prior to encounter.     Current Outpatient Medications on File Prior to Encounter   Medication Sig    donepeziL (ARICEPT) 10 MG tablet Take 10 mg by mouth every evening.    montelukast (SINGULAIR) 10 mg tablet Take 10 mg by mouth once daily.    multivitamin capsule Take 1 capsule by mouth once daily.    pantoprazole (PROTONIX) 40 MG tablet Take 40 mg by mouth once daily.    SYMBICORT 160-4.5 mcg/actuation HFAA 2 puffs 2 (two) times daily.    vitamin E 100 UNIT capsule Take 100 Units by mouth once daily.     Family History    None       Tobacco Use    Smoking status: Never    Smokeless tobacco: Never   Substance and Sexual Activity    Alcohol use: Never    Drug use: Never    Sexual activity: Not on file     Review of Systems   Unable to perform ROS: Patient unresponsive   Objective:     Vital Signs (Most Recent):  Temp: 97 °F (36.1 °C) (04/13/23 0010)  Pulse: 86 (04/13/23 0316)  Resp: (!) 40 (04/13/23 0316)  BP: 109/63 (04/13/23 0316)  SpO2: 100 % (04/13/23 0316)   Vital Signs (24h Range):  Temp:  [97 °F (36.1 °C)] 97 °F (36.1 °C)  Pulse:  [] 86  Resp:  [16-40] 40  SpO2:  [99 %-100 %] 100 %  BP: ()/(36-69) 109/63     Weight: 50.8 kg (112 lb)  Body mass index is 21.16 kg/m².    Physical Exam  Constitutional:       Appearance: Normal appearance.   HENT:      Head: Normocephalic and atraumatic.   Eyes:      Comments: Pupils fixed and constricted   Cardiovascular:      Rate and Rhythm: Regular rhythm. Tachycardia present.      Pulses: Normal pulses.      Heart sounds: Normal heart sounds.   Pulmonary:      Comments: Bilateral rales   Abdominal:      General: Bowel sounds are normal.      Palpations: Abdomen is soft.   Musculoskeletal:       Cervical back: Neck supple.   Skin:     General: Skin is warm and dry.   Neurological:      Comments: Unresponsive and not following command, GCS 3   Psychiatric:         Mood and Affect: Mood normal.           Significant Labs: All pertinent labs within the past 24 hours have been reviewed.  A1C: No results for input(s): HGBA1C in the last 4320 hours.  ABGs: No results for input(s): PH, PCO2, HCO3, POCSATURATED, BE, TOTALHB, COHB, METHB, O2HB, POCFIO2, PO2 in the last 48 hours.  Bilirubin:   Recent Labs   Lab 04/12/23 2346   BILITOT 0.7     Blood Culture: No results for input(s): LABBLOO in the last 48 hours.  BMP:   Recent Labs   Lab 04/12/23 2346      K 3.9   CO2 13*   BUN 15.0   CREATININE 0.82   CALCIUM 8.4   MG 2.30     CBC:   Recent Labs   Lab 04/12/23 2346   WBC 14.8*   HGB 7.9*   HCT 25.7*        CMP:   Recent Labs   Lab 04/12/23 2346      K 3.9   CO2 13*   BUN 15.0   CREATININE 0.82   CALCIUM 8.4   ALBUMIN 2.9*   BILITOT 0.7   ALKPHOS 85   *   ALT 91*     Cardiac Markers: No results for input(s): CKMB, MYOGLOBIN, BNP, TROPISTAT in the last 48 hours.  Coagulation:   Recent Labs   Lab 04/12/23 2346   INR 1.22     Lactic Acid: No results for input(s): LACTATE in the last 48 hours.  Lipase: No results for input(s): LIPASE in the last 48 hours.  Lipid Panel: No results for input(s): CHOL, HDL, LDLCALC, TRIG, CHOLHDL in the last 48 hours.  Magnesium:   Recent Labs   Lab 04/12/23 2346   MG 2.30     Pathology Results  (Last 10 years)      None          POCT Glucose: No results for input(s): POCTGLUCOSE in the last 48 hours.  Prealbumin: No results for input(s): PREALBUMIN in the last 48 hours.  Respiratory Culture: No results for input(s): GSRESP, RESPIRATORYC in the last 48 hours.  Troponin:   Recent Labs   Lab 04/12/23 2346   TROPONINI 0.305*     TSH:   Recent Labs   Lab 03/04/23  0510   TSH 0.390     Urine Culture: No results for input(s): LABURIN in the last 48 hours.  Urine  Studies:   Recent Labs   Lab 04/12/23  2358   APPEARANCEUA Cloudy*   PROTEINUA 100*   BILIRUBINUA Negative   UROBILINOGEN 1.0   LEUKOCYTESUR Moderate*   RBCUA 0-2   WBCUA >100*   BACTERIA Trace*       Significant Imaging:    Chest CTA Bilateral Upper and lower lobe Pes, RT sided PTX and ribs fractures

## 2023-04-13 NOTE — PROCEDURES
"Tube thoracostomy placement    Lin Flowers is a 87 y.o. female patient.  Recent lab undergoing chest tube thoracostomy placed while in the emergency room.  Patient was transferred to the ICU and enroute having chest tube dislodged from its appropriate position.  It was noted on x-ray.  Patient is in need of replacement of chest tube.    At the appropriately donning sterile gown and gloves patient's right chest wall was then sterilely prepped and draped in normal surgical fashion using chlorhexidine.  1% lidocaine with epinephrine to infiltrate the subcutaneous tissues overlying the right chest wall.  Fifteen blade was used to incise the skin overlying the region of the previous incisional site.  A 28 Chinese trocar catheter was then placed within the chest cavity without difficulty.  It was sutured at the level of the skin approximately 12 cm.  There was appropriate exit elevation of air.  It was placed to close suctioned the Pneumovax canister.  The patient tolerated the procedure well.      Patient is still in critical condition.      Complications none   Estimated blood loss:  10 cc   Specimens:  None     Disposition:  Patient will remain in the ICU for further medical management and care.      Temp: 96.3 °F (35.7 °C) (04/13/23 0830)  Pulse: 99 (04/13/23 1323)  Resp: (!) 26 (04/13/23 1323)  BP: (!) 113/52 (04/13/23 1323)  SpO2: 99 % (04/13/23 1323)  Weight: 56 kg (123 lb 7.3 oz) (04/13/23 0912)  Height: 5' 5" (165.1 cm) (04/13/23 0830)       Procedures  Leonor Roca MD    4/13/2023    "

## 2023-04-13 NOTE — PROGRESS NOTES
Pharmacist Renal Dose Adjustment Note    Lin Flowers is a 87 y.o. female being treated with the medication zosyn    Patient Data:    Vital Signs (Most Recent):  Temp: 97 °F (36.1 °C) (04/13/23 0010)  Pulse: 86 (04/13/23 0316)  Resp: (!) 40 (04/13/23 0316)  BP: 109/63 (04/13/23 0316)  SpO2: 100 % (04/13/23 0316)   Vital Signs (72h Range):  Temp:  [97 °F (36.1 °C)]   Pulse:  []   Resp:  [16-40]   BP: ()/(36-69)   SpO2:  [99 %-100 %]      Recent Labs   Lab 04/12/23 2346   CREATININE 0.82     Serum creatinine: 0.82 mg/dL 04/12/23 2346  Estimated creatinine clearance: 37.8 mL/min    Medication:zosyn dose: 4.5gm frequency q12h will be changed to medication:zosyn dose:4.5gm frequency:every 8 hours for CrCl>20 per protocol    Pharmacist's Name: Stefan Whittington

## 2023-04-13 NOTE — CONSULTS
Bedside and Verbal shift change report given to Carlos Castorena (oncoming nurse) by Lyly Jesus RN (offgoing nurse). Report included the following information SBAR, Kardex, MAR and Recent Results. SITUATION:  
? Code Status: Full Code 
? Reason for Admission: ACS (acute coronary syndrome) ([facility] [address] ? AWA (acute kidney injury) ([facility] [address] ? Hypokalemia 
 
? Hospital day: 1 ? Problem List:  
   
Hospital Problems  Date Reviewed: 8/28/2018 Codes Class Noted POA Hypokalemia ICD-10-CM: E87.6 ICD-9-CM: 276.8  8/28/2018 Unknown * (Principal)ACS (acute coronary syndrome) (HCC) ICD-10-CM: I24.9 ICD-9-CM: 411.1  8/28/2018 Unknown Acute renal failure superimposed on stage 3 chronic kidney disease (HCC) ICD-10-CM: N17.9, N18.3 ICD-9-CM: 584.9, 585.3  8/28/2018 Unknown S/P gastric surgery ICD-10-CM: K91.696 ICD-9-CM: V45.89  8/28/2018 Unknown Essential hypertension ICD-10-CM: I10 
ICD-9-CM: 401.9  2/17/2016 Yes Morbid obesity ([facility] [address] ICD-10-CM: E66.01 
ICD-9-CM: 278.01  5/14/2012 Yes Coronary artery disease ICD-10-CM: I25.10 ICD-9-CM: 414.01  Unknown Yes Overview Signed 4/12/2011  3:49 PM by Liberty Dozier  
  mild, non obstructive/EF 65% BACKGROUND:  
 Past Medical History:  
Past Medical History:  
Diagnosis Date  BPH without obstruction/lower urinary tract symptoms  Bursitis of right shoulder  CAD (coronary artery disease)  Chest pain   
 history of hospitalization with chest pain and a negative workup in 2008  Chronic edema  Constipation   
 die to medication  Coronary artery disease   
 mild, non obstructive/EF 65%  Dyspnea on exertion  Echocardiogram 12/16/08 IVC dilated; suboptimal endocardial border; EF 65%  ED (erectile dysfunction)  Elevated PSA  Frequency  GERD (gastroesophageal reflux disease)  Gout  H/O cystoscopy 06/20/2013  Hematuria, unspecified  Hypercholesterolemia [facility]  Cardiology  Consult Note    Patient Name: Lin Flowers  MRN: [id number]  Admission Date: 4/12/2023  Hospital Length of Stay: 0 days  Code Status: Full Code   Attending Provider: Judy Aguirre MD   Consulting Provider: Jerome Burkett MD  Primary Care Physician: Nilton Ndiaye MD  Principal Problem:Severe sepsis with septic shock    Patient information was obtained from patient, relative(s), ER records, and primary team.     Subjective:     Chief Complaint:  cardiac arrest    HPI: 87 year old female patient unknown to [facility]. According to the family had been feeling ill for the past few weeks with limited mobility. She was sitting in her recliner and became unresponsive which was witnessed by family. EMS was called and arrived within approximately 15 minutes (no CPR performed prior to their arrival). First responders started CPR and pt reportedly intuabated on the scene.   On EMS arrival Pt in cardiorespiratory arrest, intubated continued on CPR another 15 mins, started on levophed in ED, Chest CTA shows bilateral PEs,  Rt sided multiple rib fractures and PTX consistent with effective CPR.     Currently seen at bedside.  Patient is intubated and sedated.  EKG revealed sinus tachycardia.  CT scan reviewed.  No evidence of saddle PE.  No evidence of left main or right main pulmonary embolism.  Echocardiogram reveals preserved biventricular function.  No evidence of Velazquez sign.  Troponin down trending.    Currently on Levophed drip.  Dose being weaned down.  Currently on propofol drip.  Not following commands.    PMH: Dyslipidemia, Dementia, DM      Past Medical History:   Diagnosis Date    Alzheimer's disease, unspecified (CODE)     Asthma     High cholesterol     Kidney stones        History reviewed. No pertinent surgical history.    Review of patient's allergies indicates:  No Known Allergies    No current facility-administered medications on file prior to encounter.      Current Outpatient Medications on File Prior to Encounter   Medication Sig    donepeziL (ARICEPT) 10 MG tablet Take 10 mg by mouth every evening.    montelukast (SINGULAIR) 10 mg tablet Take 10 mg by mouth once daily.    multivitamin capsule Take 1 capsule by mouth once daily.    pantoprazole (PROTONIX) 40 MG tablet Take 40 mg by mouth once daily.    SYMBICORT 160-4.5 mcg/actuation HFAA 2 puffs 2 (two) times daily.    vitamin E 100 UNIT capsule Take 100 Units by mouth once daily.     Family History    None       Tobacco Use    Smoking status: Never    Smokeless tobacco: Never   Substance and Sexual Activity    Alcohol use: Never    Drug use: Never    Sexual activity: Not on file       Review of Systems   Unable to perform ROS: Intubated     Objective:     Vital Signs (Most Recent):  Temp: 96.3 °F (35.7 °C) (04/13/23 0830)  Pulse: 99 (04/13/23 1217)  Resp: (!) 29 (04/13/23 1217)  BP: 112/79 (04/13/23 1217)  SpO2: 99 % (04/13/23 1217)   Vital Signs (24h Range):  Temp:  [96.3 °F (35.7 °C)-97 °F (36.1 °C)] 96.3 °F (35.7 °C)  Pulse:  [] 99  Resp:  [16-40] 29  SpO2:  [96 %-100 %] 99 %  BP: ()/(36-92) 112/79     Weight: 56 kg (123 lb 7.3 oz)  Body mass index is 20.54 kg/m².    SpO2: 99 %         Intake/Output Summary (Last 24 hours) at 4/13/2023 1231  Last data filed at 4/13/2023 1112  Gross per 24 hour   Intake 2250 ml   Output 400 ml   Net 1850 ml       Lines/Drains/Airways       Drain  Duration                  Chest Tube   Tube - 1 Right Other (Comment);First intercostal space  -- days         NG/OG Tube 04/13/23 0835 [place] sump 18 Fr. Center mouth <1 day         Urethral Catheter 04/12/23 2334 16 Fr. <1 day              Airway  Duration                  Airway - Non-Surgical 04/12/23 Endotracheal Tube 1 day              Peripheral Intravenous Line  Duration                  Peripheral IV - Single Lumen 04/12/23 2332 16 G Left Antecubital <1 day         Peripheral IV - Single Lumen 04/12/23 2333 18 G   Hypertension  Hypertension  Hypogonadism male  Impotence of organic origin  Left ventricular diastolic dysfunction  Malignant neoplasm of prostate (HCC)   
 hx of t1a, izzy 6, 5 % of 1  core  Morbid obesity ([address]  Myocardial perfusion 09/05/08 Basal inferior defect c/w artifact; EF 63%  Overactive bladder  S/P cardiac cath 07/30/10  
 oD2-40%; pRCA-20-30%; EF 65%  Sleep apnea  Slowing of urinary stream   
 Type II or unspecified type diabetes mellitus without mention of complication, not stated as uncontrolled Patient taking anticoagulants yes ASSESSMENT:  
? Changes in Assessment Throughout Shift:  none ? Patient has Central Line: no Reasons if yes: na 
? Patient has Vicente Cath: no Reasons if yes: na  
 
? Last Vitals: 
  
Vitals:  
 08/28/18 2230 08/28/18 2245 08/28/18 2300 08/29/18 0009 BP: 150/81 132/66 132/73 148/82 Pulse: 90 88 91 90 Resp: 17 17 28 20 Temp:   97.9 °F (36.6 °C) 98 °F (36.7 °C) SpO2: 91% 96% 92% 92% Weight:      
Height:      
 
 
? IV and DRAINS (will only show if present) Peripheral IV-Site Assessment: Clean, dry, & intact Peripheral IV 08/28/18 Right Antecubital-Site Assessment: Clean, dry, & intact Peripheral IV 08/28/18 Left Antecubital-Site Assessment: Clean, dry, & intact ? WOUND (if present) Wound Type:  none Dressing present Dressing Present : No 
 Wound Concerns/Notes:  none ? PAIN Pain Assessment Pain Intensity 1: 0 (08/29/18 0009) Patient Stated Pain Goal: 0 
o Interventions for Pain:  none 
o Intervention effective: no and na 
o Time of last intervention:  See MAR  
o Reassessment Completed: yes ? Last 3 Weights: 
Last 3 Recorded Weights in this Encounter 08/28/18 1638 Weight: 127.9 kg (282 lb) Weight change: ? INTAKE/OUPUT Current Shift: 08/28 1901 - 08/29 0700 In: 120 [P.O.:120] Out: - Last three shifts:   
 
? LAB RESULTS Recent Labs 08/28/18 
 1643 WBC  9.0 HGB  13.4 HCT  40.8 PLT  179 Recent Labs  
   08/28/18 
 2150  08/28/18 
 1643 NA  145  142  
K  3.5  2.9*  
GLU  93  145* BUN  38*  38* CREA  2.33*  2.46* CA  8.1*  8.7 MG   --   2.1 INR  1.1   --   
 
 
RECOMMENDATIONS AND DISCHARGE PLANNING 1. Pending tests/procedures/ Plan of Care or Other Needs:  labs 2. Discharge plan for patient and Needs/Barriers: return home. 3. Estimated Discharge Date: pending Posted on Whiteboard in Patients Room: no   
 
4. The patient's care plan was reviewed with the oncoming nurse. \"HEALS\" SAFETY CHECK Fall Risk Total Score: 2 Safety Measures: Safety Measures: Bed/Chair-Wheels locked, Bed in low position, Call light within reach, Fall prevention (comment), Side rails X 3 A safety check occurred in the patient's room between off going nurse and oncoming nurse listed above. The safety check included the below items Area Items H High Alert Medications ? Verify all high alert medication drips (heparin, PCA, etc.) E Equipment ? Suction is set up for ALL patients (with patrick) ? Red plugs utilized for all equipment (IV pumps, etc.) ? WOWs wiped down at end of shift. ? Room stocked with oxygen, suction, and other unit-specific supplies A Alarms ? Bed alarm is set for fall risk patients ? Ensure chair alarm is in place and activated if patient is up in a chair L Lines ? Check IV for any infiltration ? Vicente bag is empty if patient has a Vicente ? Tubing and IV bags are labeled Alan [employer]an[employer] Safety ? Room is clean, patient is clean, and equipment is clean. ? Hallways are clear from equipment besides carts. ? Fall bracelet on for fall risk patients ? Ensure room is clear and free of clutter ? Suction is set up for ALL patients (with patrick) ? Hallways are clear from equipment besides carts. ? Isolation precautions followed, supplies available outside room, sign posted Right Antecubital <1 day         Peripheral IV - Single Lumen 04/13/23 0055 22 G Right Hand <1 day                    Significant Labs:  Recent Results (from the past 72 hour(s))   Comprehensive metabolic panel    Collection Time: 04/12/23 11:46 PM   Result Value Ref Range    Sodium Level 140 135 - 145 mmol/L    Potassium Level 3.9 3.5 - 5.1 mmol/L    Chloride 112 (H) 98 - 110 mmol/L    Carbon Dioxide 13 (L) 21 - 32 mmol/L    Glucose Level 356 (H) 70 - 115 mg/dL    Blood Urea Nitrogen 15.0 7.0 - 20.0 mg/dL    Creatinine 0.82 0.66 - 1.25 mg/dL    Calcium Level Total 8.4 8.4 - 10.2 mg/dL    Protein Total 6.3 6.3 - 8.2 gm/dL    Albumin Level 2.9 (L) 3.4 - 5.0 g/dL    Globulin 3.4 2.0 - 3.9 gm/dL    Albumin/Globulin Ratio 0.9 ratio    Bilirubin Total 0.7 0.0 - 1.0 mg/dL    Alkaline Phosphatase 85 50 - 144 unit/L    Alanine Aminotransferase 91 (H) 1 - 45 unit/L    Aspartate Aminotransferase 152 (H) 14 - 36 unit/L    eGFR 70 mls/min/1.73/m2    Anion Gap 15.0 (H) 2.0 - 13.0 mEq/L    BUN/Creatinine Ratio 18 12 - 20   NT-Pro Natriuretic Peptide    Collection Time: 04/12/23 11:46 PM   Result Value Ref Range    ProBNP 1,040 (H) 10 - 450 PG/ML   Troponin I    Collection Time: 04/12/23 11:46 PM   Result Value Ref Range    Troponin-I 0.305 (HH) 0.000 - 0.034 ng/mL   D dimer, quantitative    Collection Time: 04/12/23 11:46 PM   Result Value Ref Range    D-Dimer >15.00 (H) 0.19 - 0.50 mg/L   Lactic acid, plasma    Collection Time: 04/12/23 11:46 PM   Result Value Ref Range    Lactic Acid Level 9.6 (HH) 0.4 - 2.0 mmol/L   Magnesium    Collection Time: 04/12/23 11:46 PM   Result Value Ref Range    Magnesium Level 2.30 1.80 - 2.40 mg/dL   CBC with Differential    Collection Time: 04/12/23 11:46 PM   Result Value Ref Range    WBC 14.8 (H) 4.0 - 11.5 x10(3)/mcL    RBC 2.53 (L) 4.00 - 5.10 x10(6)/mcL    Hgb 7.9 (L) 11.8 - 16.0 g/dL    Hct 25.7 (L) 36.0 - 48.0 %    .6 (H) 79.0 - 99.0 fL    MCH 31.2 27.0 - 34.0 pg    MCHC 30.7 (L)  31.0 - 37.0 g/dL    RDW 13.8 11.0 - 14.5 %    Platelet 284 140 - 371 x10(3)/mcL    MPV 10.2 9.4 - 12.4 fL    IG# 0.90 (H) 0.0001 - 0.031 x10(3)/mcL    IG% 6.1 (H) 0 - 0.5 %    NRBC% 0.3 0 - 1 %   Protime-INR    Collection Time: 04/12/23 11:46 PM   Result Value Ref Range    PT 12.3 (H) 9.3 - 11.9 seconds    INR 1.22 <=5.00   APTT    Collection Time: 04/12/23 11:46 PM   Result Value Ref Range    PTT 37.7 (H) 23.0 - 29.4 seconds   Manual Differential    Collection Time: 04/12/23 11:46 PM   Result Value Ref Range    Neut Man 62 47 - 80 %    Band Neutrophil Man 8 0 - 11 %    Lymph Man 27 13 - 40 %    Monocyte Man 3 2 - 11 %    Abs Neut calc 10.36 (H) 2.1 - 9.2 x10(3)/mcL    Abs Mono 0.444 0.1 - 1.3 x10(3)/mcL    Abs Lymp 3.996 0.6 - 4.6 x10(3)/mcL    RBC Morph Abnormal (A) Normal    Anisocyte 1+ (A) (none)    Poik 1+ (A) (none)    Macrocyte 1+ (A) (none)    Lakia Cells 1+ (A) (none)    Platelet Est Normal Normal, Adequate   RT Blood Gas    Collection Time: 04/12/23 11:47 PM   Result Value Ref Range    Sample Type Arterial Blood     pH, Blood gas 7.109 (LL) 7.350 - 7.450    pCO2, Blood gas 39.4 35.0 - 45.0 mmHg    pO2, Blood gas 487.0 (H) 80.0 - 105.0 mmHg    sO2, Blood gas 100.0 95.0 - 100.0 %    HCO3, Blood gas 12.2 (L) 22.0 - 26.0 mmol/L    Base Excess, Blood gas -15.90 (L) -2.00 - 2.00 mmol/L    FIO2, Blood gas 100 %    Vt 400 ml    RR 16 b/min    PEEP 5.0 cmH2O    MODE AC     Notified Time 2,352     Notified DR OSWALD     Notified By ANA SHAHID, RRT    Urinalysis Catheterized    Collection Time: 04/12/23 11:58 PM   Result Value Ref Range    Color, UA Yellow Yellow, Light-Yellow, Dark Yellow, Barbara, Straw    Appearance, UA Cloudy (A) Clear    Specific Gravity, UA 1.025     pH, UA 6.0 5.0 - 8.5    Protein,  (A) Negative mg/dL    Glucose,  (A) Negative, Normal mg/dL    Ketones, UA Negative Negative mg/dL    Blood, UA Large (A) Negative unit/L    Bilirubin, UA Negative Negative mg/dL    Urobilinogen, UA  Hardeep Taveras, RN 
 
 1.0 0.2, 1.0, Normal mg/dL    Nitrites, UA Negative Negative    Leukocyte Esterase, UA Moderate (A) Negative unit/L   Urinalysis, Microscopic    Collection Time: 04/12/23 11:58 PM   Result Value Ref Range    Bacteria, UA Trace (A) None Seen, Rare, Occasional /HPF    RBC, UA 0-2 None Seen, 0-2, 3-5, 0-5 /HPF    WBC, UA >100 (A) None Seen, 0-2, 3-5, 0-5 /HPF    Squamous Epithelial Cells, UA Few (A) None Seen, Rare, Occasional, Occ /HPF   COVID-19 Rapid Screening    Collection Time: 04/12/23 11:59 PM   Result Value Ref Range    SARS COV-2 MOLECULAR Negative Negative   Lactic Acid, Plasma    Collection Time: 04/13/23  1:49 AM   Result Value Ref Range    Lactic Acid Level 7.3 (HH) 0.4 - 2.0 mmol/L   Lactic Acid, Plasma    Collection Time: 04/13/23  4:09 AM   Result Value Ref Range    Lactic Acid Level 5.8 (HH) 0.4 - 2.0 mmol/L   Troponin I    Collection Time: 04/13/23  4:09 AM   Result Value Ref Range    Troponin-I 2.060 (HH) 0.000 - 0.034 ng/mL   Comprehensive metabolic panel    Collection Time: 04/13/23  4:09 AM   Result Value Ref Range    Sodium Level 143 135 - 145 mmol/L    Potassium Level 3.6 3.5 - 5.1 mmol/L    Chloride 108 98 - 110 mmol/L    Carbon Dioxide 22 21 - 32 mmol/L    Glucose Level 349 (H) 70 - 115 mg/dL    Blood Urea Nitrogen 19.0 7.0 - 20.0 mg/dL    Creatinine 0.75 0.66 - 1.25 mg/dL    Calcium Level Total 8.3 (L) 8.4 - 10.2 mg/dL    Protein Total 6.0 (L) 6.3 - 8.2 gm/dL    Albumin Level 2.8 (L) 3.4 - 5.0 g/dL    Globulin 3.2 2.0 - 3.9 gm/dL    Albumin/Globulin Ratio 0.9 ratio    Bilirubin Total 1.1 (H) 0.0 - 1.0 mg/dL    Alkaline Phosphatase 115 50 - 144 unit/L    Alanine Aminotransferase 103 (H) 1 - 45 unit/L    Aspartate Aminotransferase 207 (H) 14 - 36 unit/L    eGFR 78 mls/min/1.73/m2    Anion Gap 13.0 2.0 - 13.0 mEq/L    BUN/Creatinine Ratio 25 (H) 12 - 20   CBC with Differential    Collection Time: 04/13/23  4:09 AM   Result Value Ref Range    WBC 19.3 (H) 4.0 - 11.5 x10(3)/mcL    RBC 3.00 (L)  4.00 - 5.10 x10(6)/mcL    Hgb 9.1 (L) 11.8 - 16.0 g/dL    Hct 29.0 (L) 36.0 - 48.0 %    MCV 96.7 79.0 - 99.0 fL    MCH 30.3 27.0 - 34.0 pg    MCHC 31.4 31.0 - 37.0 g/dL    RDW 13.7 11.0 - 14.5 %    Platelet 303 140 - 371 x10(3)/mcL    MPV 9.6 9.4 - 12.4 fL    IG# 0.13 (H) 0.0001 - 0.031 x10(3)/mcL    IG% 0.7 (H) 0 - 0.5 %   Manual Differential    Collection Time: 04/13/23  4:09 AM   Result Value Ref Range    Neut Man 80 47 - 80 %    Band Neutrophil Man 4 0 - 11 %    Lymph Man 4 (L) 13 - 40 %    Monocyte Man 12 (H) 2 - 11 %    Abs Neut calc 16.212 (H) 2.1 - 9.2 x10(3)/mcL    Abs Mono 2.316 (H) 0.1 - 1.3 x10(3)/mcL    Abs Lymp 0.772 0.6 - 4.6 x10(3)/mcL    RBC Morph Normal Normal    Platelet Est Normal Normal, Adequate   CK    Collection Time: 04/13/23  4:09 AM   Result Value Ref Range    Creatine Kinase 687 (H) 30 - 135 U/L   Phosphorus    Collection Time: 04/13/23  4:09 AM   Result Value Ref Range    Phosphorus Level 5.1 (H) 2.5 - 4.9 mg/dL   RT Blood Gas    Collection Time: 04/13/23  4:40 AM   Result Value Ref Range    Sample Type Arterial Blood     pH, Blood gas 7.409 7.350 - 7.450    pCO2, Blood gas 33.8 (L) 35.0 - 45.0 mmHg    pO2, Blood gas 124.0 (H) 80.0 - 105.0 mmHg    CO Hgb 1.2 0.0 - 1.5 %    sO2, Blood gas 99.8 95.0 - 100.0 %    HCO3, Blood gas 22.1 22.0 - 26.0 mmol/L    Base Excess, Blood gas -2.80 (L) -2.00 - 2.00 mmol/L    FIO2, Blood gas 35.0 %    Vt 400 ml    RR 16 b/min    PEEP 5.0 cmH2O    MODE AC     IP 0.0 cmH2O    PIP 0 cmH20    Notified  Parent     Notified By Printess RT    Echo    Collection Time: 04/13/23  9:00 AM   Result Value Ref Range    TDI SEPTAL 0.07 m/s    LV LATERAL E/E' RATIO 8.71 m/s    LV SEPTAL E/E' RATIO 8.71 m/s    IVC diameter 2.08 cm    EF 63 %    Left Ventricular Outflow Tract Mean Velocity 0.51 cm/s    Left Ventricular Outflow Tract Mean Gradient 1.20 mmHg    AORTIC VALVE CUSP SEPERATION 1.07 cm    TDI LATERAL 0.07 m/s    LVIDd 4.04 3.5 - 6.0 cm    IVS 0.90 0.6 - 1.1 cm     Posterior Wall 0.69 0.6 - 1.1 cm    LVIDs 2.67 2.1 - 4.0 cm    FS 34 28 - 44 %    Ascending aorta 1.92 cm    LV mass 94.21 g    LA size 2.27 cm    TAPSE 1.38 cm    Left Ventricle Relative Wall Thickness 0.34 cm    AV mean gradient 4 mmHg    AV valve area 1.38 cm2    AV Velocity Ratio 0.53     AV index (prosthetic) 0.64     E/A ratio 0.79     Mean e' 0.07 m/s    E wave deceleration time 247.00 msec    LVOT diameter 1.65 cm    LVOT area 2.1 cm2    LVOT peak jak 0.78 m/s    LVOT peak VTI 14.70 cm    Ao peak jak 1.48 m/s    Ao VTI 22.8 cm    LVOT stroke volume 31.42 cm3    AV peak gradient 9 mmHg    E/E' ratio 8.71 m/s    MV Peak E Jak 0.61 m/s    TR Max Jak 2.86 m/s    MV Peak A Jak 0.77 m/s    LV Systolic Volume 26.30 mL    LV Diastolic Volume 71.70 mL    Triscuspid Valve Regurgitation Peak Gradient 33 mmHg    LA volume (mod) 20.80 cm3    LV Systolic Volume Index 16.3 mL/m2    LV Diastolic Volume Index 44.53 mL/m2    LV Mass Index 59 g/m2    LA Volume Index (Mod) 12.9 mL/m2   Type & Screen    Collection Time: 04/13/23  9:33 AM   Result Value Ref Range    Specimen Outdate 04/16/2023 23:59     ABO and RH O POS     Antibody Screen NEG    Lactic Acid, Plasma    Collection Time: 04/13/23  9:33 AM   Result Value Ref Range    Lactic Acid Level 6.4 (HH) 0.4 - 2.0 mmol/L   POCT glucose    Collection Time: 04/13/23 10:54 AM   Result Value Ref Range    POCT Glucose 369 (H) 70 - 110 mg/dL   Lactic Acid, Plasma    Collection Time: 04/13/23 11:45 AM   Result Value Ref Range    Lactic Acid Level 6.2 (HH) 0.4 - 2.0 mmol/L       Significant Imaging:  Imaging Results              X-Ray Chest AP Portable (Final result)  Result time 04/13/23 06:52:35      Final result by Mary Carmen Shore III, MD (04/13/23 06:52:35)                   Impression:      1. There is continued progression of the patient's right apical pneumothorax and right-sided subcutaneous emphysema as described above.  See above comments for details.      Electronically  signed by: Mary Carmen Shroe  Date:    04/13/2023  Time:    06:52               Narrative:    EXAMINATION:  STUDY: XR CHEST AP PORTABLE    CLINICAL HISTORY AND TECHNIQUE:  Bharath Miranda, RT on 4/13/2023  6:21 AM    CURRENT CLINICAL HISTORY: ER PT    FOLLOW UP  CHEST TUBE PLACEMENT    PTA  CARDIAC ARREST     RT SIDED PNEUMOTHORAX    RT SIDED CHEST TUBE PLACEMENT   PT ON VENT   LOOKED AT BY DR PINEDO    PMH: ASTHMA ALZHEIMER'S DISEASE  KIDNEY STONE    TECHNIQUE:1V  PORTABLE CHEST    TECHNOLOGIST: RW    COMPARISON:  Chest x-ray obtained approximately 1-1/2 hours earlier    FINDINGS:  The position of the patient's small gauge right-sided chest tube and endotracheal 2 has not changed appreciably.  Since the prior examination there is continued expansion of the right apical pneumothorax (estimated to be approximately 10% volume on the current exam) and continued progression of the subcutaneous emphysema within the right chest wall.  The cardiac, hilar, and mediastinal contours appear unremarkable.I see no lobar or segmental infiltrates.No significant pleural effusions are noted.There is moderate demineralization of the skeletal structures with mild degenerative changes noted involving the thoracic spine.                                       X-Ray Chest AP Portable (Final result)  Result time 04/13/23 04:46:54      Final result by Mary Carmen Shore III, MD (04/13/23 04:46:54)                   Impression:      1. A small gauge, right-sided chest tube is present with the distal tip located within the region of the right hilum.  2. The previously noted right apical pneumothorax has expanded slightly since prior examination (estimated volume of approximately 5%) and the previously noted right-sided subcutaneous emphysema has progressed considerably.      Electronically signed by: Mary Carmen Shore  Date:    04/13/2023  Time:    04:46               Narrative:    EXAMINATION:  STUDY: XR CHEST AP PORTABLE    CLINICAL HISTORY AND  TECHNIQUE:  Bharath Miranda, RT on 4/13/2023  4:43 AM    CURRENT CLINICAL HISTORY: ER PT    PTA  CARDIAC ARREST     RT SIDED PNEUMOTHORAX    RT SIDED CHEST TUBE PLACEMENT   PT ON VENT   LOOKED AT BY DR PINEDO    PMH: ASTHMA ALZHEIMER'S DISEASE  KIDNEY STONE    TECHNIQUE:1V  PORTABLE CHEST    TECHNOLOGIST: ANNAMARIE    COMPARISON:  None    FINDINGS:  The distal tip of the patient's endotracheal tube is approximately 4 cm above the fernanda.  A small gauge, right-sided chest tube is present with the distal tip located within the region of the right hilum.  The previously noted right apical pneumothorax appears to have expanded slightly with an estimated volume approximately 5 % on the current exam.  The previously noted right axillary subcutaneous emphysema has also progressed considerably extending more inferiorly along the right lateral chest wall and into the right supraclavicular region.The cardiac, hilar, and mediastinal contours appear unremarkable.I see no lobar or segmental infiltrates.No significant pleural effusions are noted.There is moderate demineralization of the skeletal structures with mild degenerative changes noted involving thoracic spine.                                       CTA Chest Non-Coronary (PE Studies) (Final result)  Result time 04/13/23 06:51:43      Final result by Mariano Raygoza MD (04/13/23 06:51:43)                   Impression:      1.  Extensive bilateral pulmonary embolization with resultant right heart strain as described above    2.  Right anterior lateral rib fractures with underlying pneumothorax and probable pulmonary contusion  3.  Thoracolumbar spondylosis    NOTE: Agree with preliminary report.  Primary physician was notified at the time of the reading.    All CT scans at [this location] are performed using dose modulation techniques as appropriate to a performed exam including the following:  Automated exposure control; adjustment of the mA and/or kV according to patient size (this  includes techniques or standardized protocols for targeted exams where dose is matched to indication / reason for exam; i.e. extremities or head); use of iterative reconstruction technique.    Finalized on: 4/13/2023 6:51 AM By:  Mariano Raygoza MD  [facility]# [id number]      2023-04-13 06:53:45.644    [facility]               Narrative:    EXAM: CTA CHEST NON CORONARY (PE STUDIES)    CLINICAL INDICATION: Suspect pulmonary embolism    TECHNIQUE: Axial and multiplanar 2-D reformations provided.  With IV contrast and a CTA technique    PRIORS: Chest radiograph    FINDINGS:  1.  Bilateral pulmonary emboli are present in both upper lobes and involving the right lower lobe with evidence of right heart strain    2.  Right anterior lateral rib fractures from the third to the seventh ribs with extensive subcutaneous emphysematous changes  5.  Thoracolumbar spondylosis without evidence of acute vertebral body fracture  3.  Right pneumothorax  4.  Patchy airspace disease in the right lung diffusely                                         CT Head Without Contrast (Final result)  Result time 04/13/23 06:47:10      Final result by Mariano Raygzoa MD (04/13/23 06:47:10)                   Impression:      1.  No evidence for acute intracranial event    2.  Senescent changes as described (agree with preliminary)    All CT scans at [this location] are performed using dose modulation techniques as appropriate to a performed exam including the following:  Automated exposure control; adjustment of the mA and/or kV according to patient size (this includes techniques or standardized protocols for targeted exams where dose is matched to indication / reason for exam; i.e. extremities or head); use of iterative reconstruction technique.    Finalized on: 4/13/2023 6:47 AM By:  Mariano Raygoza MD  [facility]# [id number]      2023-04-13 06:49:[id number]    [facility]               Narrative:    EXAM: CT HEAD WITHOUT CONTRAST    CLINICAL INDICATION: Cardiac arrest    TECHNIQUE:  Axial and multiplanar 2-D reformations provided.  Without IV contrast    PRIORS: None    FINDINGS: There is no evidence for acute intracranial hemorrhage, mass nor midline shift.  CSF-containing spaces are increased in size consistent with symmetrical global atrophy.  Deep white matter periventricular changes are mild.  Paranasal sinuses, orbits and mastoids are all clear.                                         X-Ray Chest AP Portable (Final result)  Result time 04/13/23 04:32:53      Final result by Mary Carmen Shore III, MD (04/13/23 04:32:53)                   Impression:      1. The distal tip of the patient's endotracheal tube is approximately 3-4 cm above the fernanda.  2. Moderate subcutaneous emphysema is noted within the region of the right axilla and a small (less than 5% volume) right apical pneumothorax is present.  See above comments.The emergency room physician was notified of this finding at the time of this interpretation (approximately 04:30 on 04/13/2023 via telephone).    COMMUNICATION  This critical result was discovered/received at 04:30 on 04/13/2023.  The critical information above was relayed directly by me by telephone to Dr. Quintero on 04/13/2023 at 04:30.      Electronically signed by: Mary Carmen Shore  Date:    04/13/2023  Time:    04:32               Narrative:    EXAMINATION:  STUDY: XR CHEST AP PORTABLE    CLINICAL HISTORY AND TECHNIQUE:  RT Alvin on 4/13/2023 12:06 AM    CURRENT CLINICAL HISTORY: ER PT    PTA  CARDIAC ARREST     ET TUBE PLACEMENT    PMH: ASTHMA ALZHEIMER'S DISEASE  KIDNEY STONE    TECHNIQUE:1V  PORTABLE CHEST    TECHNOLOGIST: ANNAMARIE    COMPARISON:  03/03/2023    FINDINGS:  The image is overpenetrated and less than optimal.  The distal tip of the patient's endotracheal tube is 3-4 cm above the fernanda.  A small (less than 5% volume) right apical pneumothorax is present.  A moderate amount of subcutaneous emphysema is noted within the region of the right axilla.  Skin folds  overlie the mid and lower right lung.  The cardiac, hilar, and mediastinal contours appear unremarkable.I see no lobar or segmental infiltrates.No significant pleural effusions are noted.There is moderate demineralization of the skeletal structures with mild degenerative changes noted involving the thoracic spine.                                  Results for orders placed during the hospital encounter of 04/12/23    Echo    Interpretation Summary  · The left ventricle is normal in size with normal systolic function. LVEF 60-65%.  · Grade I left ventricular diastolic dysfunction.  · Normal right ventricular size with normal right ventricular systolic function. No evidence of Velazquez's sign.  · There is mild aortic valve stenosis.  · Mild to moderate tricuspid regurgitation.  · Mechanically ventilated; cannot use inferior caval vein diameter to estimate central venous pressure.    Hx of cardiac arrest  b/l PE  chest tube     EKG:        Telemetry:  Sinus Tachycardia 102    Physical Exam  Vitals and nursing note reviewed.   Constitutional:       Comments: Intubated and sedated   Cardiovascular:      Rate and Rhythm: Tachycardia present.      Comments: Soft ejection systolic murmur in the right upper sternal border  Pulmonary:      Comments: Ventilator associated rhonchi  Skin:     General: Skin is warm.   Neurological:      Comments: sedated       Home Medications:   No current facility-administered medications on file prior to encounter.     Current Outpatient Medications on File Prior to Encounter   Medication Sig Dispense Refill    donepeziL (ARICEPT) 10 MG tablet Take 10 mg by mouth every evening.      montelukast (SINGULAIR) 10 mg tablet Take 10 mg by mouth once daily.      multivitamin capsule Take 1 capsule by mouth once daily.      pantoprazole (PROTONIX) 40 MG tablet Take 40 mg by mouth once daily.      SYMBICORT 160-4.5 mcg/actuation HFAA 2 puffs 2 (two) times daily.      vitamin E 100 UNIT capsule Take 100  Units by mouth once daily.         Current Inpatient Medications:    Current Facility-Administered Medications:     0.9%  NaCl infusion, , Intravenous, Continuous, Judy Aguirre MD, Last Rate: 125 mL/hr at 04/13/23 1001, Rate Verify at 04/13/23 1001    dextrose 10% bolus 125 mL 125 mL, 12.5 g, Intravenous, PRN, Judy Aguirre MD    dextrose 10% bolus 250 mL 250 mL, 25 g, Intravenous, PRN, Judy Aguirre MD    enoxaparin injection 50 mg, 50 mg, Subcutaneous, Q12H, Judy Aguirre MD    famotidine (PF) injection 20 mg, 20 mg, Intravenous, Daily, Judy Aguirre MD, 20 mg at 04/13/23 0841    glucagon (human recombinant) injection 1 mg, 1 mg, Intramuscular, PRN, Judy Aguirre MD    glucagon (human recombinant) injection 1 mg, 1 mg, Intramuscular, PRN, Judy Aguirre MD    insulin aspart U-100 pen 1-10 Units, 1-10 Units, Subcutaneous, Q6H PRN, Judy Aguirre MD, 10 Units at 04/13/23 1119    insulin aspart U-100 pen 1-10 Units, 1-10 Units, Subcutaneous, Q6H PRN, Judy Aguirre MD    mupirocin 2 % ointment, , Nasal, BID, Judy Aguirre MD, Given at 04/13/23 0840    NORepinephrine 8 mg in dextrose 5% 250 mL infusion, 0-3 mcg/kg/min, Intravenous, Continuous, Judy Aguirre MD, Last Rate: 19.1 mL/hr at 04/13/23 1220, 0.2 mcg/kg/min at 04/13/23 1220    ondansetron injection 4 mg, 4 mg, Intravenous, Q8H PRN, Judy Aguirre MD    pantoprazole injection 40 mg, 40 mg, Intravenous, Daily, Judy Aguirre MD, 40 mg at 04/13/23 0903    piperacillin-tazobactam (ZOSYN) 4.5 g in dextrose 5 % in water (D5W) 5 % 100 mL IVPB (MB+), 4.5 g, Intravenous, Q8H, Judy Aguirre MD, Stopped at 04/13/23 1112    propofol (DIPRIVAN) 10 mg/mL infusion, 0-60 mcg/kg/min, Intravenous, Continuous, Judy Aguirre MD, Last Rate: 9.1 mL/hr at 04/13/23 1200, 30 mcg/kg/min at 04/13/23 1200    sodium chloride 0.9% flush 10 mL, 10 mL, Intravenous, PRN, Judy Aguirre MD         VTE Risk Mitigation (From admission, onward)            Ordered     enoxaparin injection 50 mg  Every 12 hours (non-standard times)         04/13/23 0356                    Assessment:   Acute hypoxemic respiratory failure status post mechanical ventilation    Sepsis/UTI    Bilateral pulmonary embolism - likely provoked by underlying infection and decreased mobility    Preserved EF and normal RV and LV function    On therapeutic Lovenox    s/p cardiac arrest, remains intubated and sedated (approximately 15 minutes of no CPR)    On Levo    Await neurological status    Pneumothorax    Could consider changing Lovenox to Heparin    Full code    NSTEMI  -  troponin 0.3 to 2.0  -  likely secondary to PE and CPR        Plan:   Patient was independently seen by me and MDM was performed by me, Jerome Burkett MD     No need for any form of thrombectomy at this point in time.  No need for any systemic tPA at this point in time.    Extensive discussion about neurological outcome, given 15 minute down time without CPR.    Antibiotics per primary team    Continue therapeutic Lovenox.  Can consider switching to IV heparin if any concerns for bleeding.    Trend troponin until peak    Chest tube management per surgery    Wean Levophed as tolerated        Thank you for your consult.  We will continue to follow along    Jerome Burkett MD and TREVOR Patel  Cardiology  [facility]  04/13/2023 12:31 PM

## 2023-04-13 NOTE — PLAN OF CARE
Problem: Physical Therapy  Goal: Physical Therapy Goal  Description: Goals to be met by: discharge     Patient will increase functional independence with mobility by performin. Supine to sit with Stand-by Assistance  2. Sit to stand transfer with Stand-by Assistance    Outcome: Ongoing, Progressing

## 2023-04-13 NOTE — ASSESSMENT & PLAN NOTE
This patient does have evidence of infective focus  My overall impression is septic shock due to lactate > 4, MAP < 65 and SBP < 90.  Source: Urinary Tract  Antibiotics given-   Antibiotics (72h ago, onward)    Start     Stop Route Frequency Ordered    04/13/23 0930  mupirocin 2 % ointment         04/18 0859 Nasl 2 times daily 04/13/23 0820    04/13/23 0700  piperacillin-tazobactam (ZOSYN) 4.5 g in dextrose 5 % in water (D5W) 5 % 100 mL IVPB (MB+)         -- IV Every 8 hours (non-standard times) 04/13/23 0620        Latest lactate reviewed-  No results for input(s): LACTATE in the last 72 hours.  Organ dysfunction indicated by Acute kidney injury, Acute respiratory failure and Encephalopathy    Fluid challenge Actual Body weight- Patient will receive 30ml/kg actual body weight to calculate fluid bolus for treatment of septic shock.     Post- resuscitation assessment Yes Perfusion exam was performed within 6 hours of septic shock presentation after bolus shows Adequate tissue perfusion assessed by non-invasive monitoring       Will Start Pressors- Levophed for MAP of 65  Source control achieved by: levophed  Iv abx zosyn

## 2023-04-13 NOTE — PLAN OF CARE
04/13/23 1156   Discharge Assessment   Assessment Type Discharge Planning Assessment   Confirmed/corrected address, phone number and insurance Yes   Confirmed Demographics Correct on Facesheet   Source of Information family   When was your last doctors appointment? 02/01/23   Reason For Admission Cardiac Arrest, Bilateral Pulmonary Embolism, Pneumothorax   People in Home child(brice), adult   Do you expect to return to your current living situation? No  (Patient may need placement for futher services)   Prior to hospitilization cognitive status: Alert/Oriented   Current cognitive status: Coma/Sedated/Intubated   Walking or Climbing Stairs ambulation difficulty, assistance 1 person   Mobility Management Family assists   Dressing/Bathing bathing difficulty, assistance 1 person;dressing difficulty, assistance 1 person   Dressing/Bathing Management Dasughter assists   Equipment Currently Used at Home nebulizer  (Rollator at home but doesn't normally use)   Readmission within 30 days? No   Patient currently being followed by outpatient case management? No   Do you currently have service(s) that help you manage your care at home? Yes   Name and Contact number of agency [facility]   [phone]   Is the pt/caregiver preference to resume services with current agency Yes   Do you take prescription medications? Yes   Do you have prescription coverage? Yes   Coverage MCR   Do you have any problems affording any of your prescribed medications? No   Is the patient taking medications as prescribed? yes   How do you get to doctors appointments? family or friend will provide   Are you on dialysis? No   Do you take coumadin? No   Discharge Plan A Long-term acute care facility (LTAC)   Discharge Plan B Skilled Nursing Facility   DME Needed Upon Discharge  none   Discharge Plan discussed with: Adult children   Discharge Barriers Identified None   Physical Activity   On average, how many days per week do you engage in moderate to  strenuous exercise (like a brisk walk)? 0 days   On average, how many minutes do you engage in exercise at this level? 0 min   Financial Resource Strain   How hard is it for you to pay for the very basics like food, housing, medical care, and heating? Not hard   Housing Stability   In the last 12 months, was there a time when you were not able to pay the mortgage or rent on time? N   In the last 12 months, how many places have you lived? 1   In the last 12 months, was there a time when you did not have a steady place to sleep or slept in a shelter (including now)? N   Transportation Needs   In the past 12 months, has lack of transportation kept you from medical appointments or from getting medications? no   In the past 12 months, has lack of transportation kept you from meetings, work, or from getting things needed for daily living? No   Food Insecurity   Within the past 12 months, you worried that your food would run out before you got the money to buy more. Never true   Within the past 12 months, the food you bought just didn't last and you didn't have money to get more. Never true   Stress   Do you feel stress - tense, restless, nervous, or anxious, or unable to sleep at night because your mind is troubled all the time - these days? Only a littl   Social Connections   In a typical week, how many times do you talk on the phone with family, friends, or neighbors? Twice a week   How often do you get together with friends or relatives? More than 3   How often do you attend Voodoo or Yazdanism services? More than 4   Do you belong to any clubs or organizations such as Voodoo groups, unions, fraternal or athletic groups, or school groups? No   How often do you attend meetings of the clubs or organizations you belong to? Never   Are you , , , , never , or living with a partner?    Alcohol Use   Q1: How often do you have a drink containing alcohol? Never   Q2: How many drinks  containing alcohol do you have on a typical day when you are drinking? None   Q3: How often do you have six or more drinks on one occasion? Never   OTHER   Name(s) of People in Home Farideh Do, Rizwana Perea-Sister

## 2023-04-14 PROBLEM — E83.42 HYPOMAGNESEMIA: Status: ACTIVE | Noted: 2023-04-14

## 2023-04-14 PROBLEM — E87.6 HYPOKALEMIA: Status: ACTIVE | Noted: 2023-04-14

## 2023-04-14 LAB
ALBUMIN SERPL-MCNC: 2.4 G/DL (ref 3.4–5)
ALBUMIN/GLOB SERPL: 0.8 RATIO
ALP SERPL-CCNC: 82 UNIT/L (ref 50–144)
ALT SERPL-CCNC: 69 UNIT/L (ref 1–45)
ANION GAP SERPL CALC-SCNC: 6 MEQ/L (ref 2–13)
APTT PPP: 36.5 SECONDS (ref 23–29.4)
AST SERPL-CCNC: 130 UNIT/L (ref 14–36)
BASE EXCESS BLD CALC-SCNC: 1.2 MMOL/L (ref -2–2)
BASOPHILS # BLD AUTO: 0.03 X10(3)/MCL (ref 0.01–0.08)
BASOPHILS NFR BLD AUTO: 0.2 % (ref 0.1–1.2)
BILIRUBIN DIRECT+TOT PNL SERPL-MCNC: 0.8 MG/DL (ref 0–1)
BLOOD GAS SAMPLE TYPE (OHS): ABNORMAL
BUN SERPL-MCNC: 17 MG/DL (ref 7–20)
CALCIUM SERPL-MCNC: 7 MG/DL (ref 8.4–10.2)
CHLORIDE SERPL-SCNC: 115 MMOL/L (ref 98–110)
CO2 SERPL-SCNC: 21 MMOL/L (ref 21–32)
CREAT SERPL-MCNC: 0.6 MG/DL (ref 0.66–1.25)
CREAT/UREA NIT SERPL: 28 (ref 12–20)
EOSINOPHIL # BLD AUTO: 0 X10(3)/MCL (ref 0.04–0.36)
EOSINOPHIL NFR BLD AUTO: 0 % (ref 0.7–7)
ERYTHROCYTE [DISTWIDTH] IN BLOOD BY AUTOMATED COUNT: 13.8 % (ref 11–14.5)
FIO2 BLOOD GAS (OHS): 30 %
GFR SERPLBLD CREATININE-BSD FMLA CKD-EPI: 88 MLS/MIN/1.73/M2
GLOBULIN SER-MCNC: 2.9 GM/DL (ref 2–3.9)
GLUCOSE SERPL-MCNC: 136 MG/DL (ref 70–115)
HCO3 BLDA-SCNC: 23.4 MMOL/L (ref 22–26)
HCT VFR BLD AUTO: 23.2 % (ref 36–48)
HGB BLD-MCNC: 7.6 G/DL (ref 11.8–16)
HYPOCHROMIA BLD QL SMEAR: SLIGHT
IMM GRANULOCYTES # BLD AUTO: 0.11 X10(3)/MCL (ref 0–0.03)
IMM GRANULOCYTES NFR BLD AUTO: 0.6 % (ref 0–0.5)
INR BLD: 1.1
LACTATE SERPL-SCNC: 1.3 MMOL/L (ref 0.4–2)
LYMPHOCYTES # BLD AUTO: 1.39 X10(3)/MCL (ref 1.16–3.74)
LYMPHOCYTES NFR BLD AUTO: 8 % (ref 20–55)
MAGNESIUM SERPL-MCNC: 1.6 MG/DL (ref 1.8–2.4)
MCH RBC QN AUTO: 30.4 PG (ref 27–34)
MCV RBC AUTO: 92.8 FL (ref 79–99)
MEAN CELL HEMOGLOBIN CONCENTRATION (OHS) G/DL: 32.8 G/DL (ref 31–37)
MECH RR (OHS): 16 B/MIN
MECH VT (OHS): 400 ML
MODE (OHS): AC
MONOCYTES # BLD AUTO: 0.75 X10(3)/MCL (ref 0.24–0.36)
MONOCYTES NFR BLD AUTO: 4.3 % (ref 4.7–12.5)
NEUTROPHILS # BLD AUTO: 15.09 X10(3)/MCL (ref 1.56–6.13)
NEUTROPHILS NFR BLD AUTO: 86.9 % (ref 37–73)
NOTIFIED (OHS): ABNORMAL
NOTIFIED BY (OHS): ABNORMAL
NRBC BLD AUTO-RTO: 0 % (ref 0–1)
PCO2 BLDA: 28.9 MMHG (ref 35–45)
PEEP (OHS): 5 CMH2O
PH BLDA: 7.48 [PH] (ref 7.35–7.45)
PHOSPHATE SERPL-MCNC: 3.4 MG/DL (ref 2.5–4.9)
PLATELET # BLD AUTO: 261 X10(3)/MCL (ref 140–371)
PLATELET # BLD EST: NORMAL 10*3/UL
PMV BLD AUTO: 10.1 FL (ref 9.4–12.4)
PO2 BLDA: 171 MMHG (ref 80–105)
POCT GLUCOSE: 137 MG/DL (ref 70–110)
POCT GLUCOSE: 155 MG/DL (ref 70–110)
POCT GLUCOSE: 167 MG/DL (ref 70–110)
POCT GLUCOSE: 181 MG/DL (ref 70–110)
POTASSIUM SERPL-SCNC: 3.3 MMOL/L (ref 3.5–5.1)
PROT SERPL-MCNC: 5.3 GM/DL (ref 6.3–8.2)
PROTHROMBIN TIME: 11.2 SECONDS (ref 9.3–11.9)
RBC # BLD AUTO: 2.5 X10(6)/MCL (ref 4–5.1)
SAO2 % BLDA: 100 % (ref 95–100)
SODIUM SERPL-SCNC: 142 MMOL/L (ref 135–145)
TROPONIN I SERPL-MCNC: 1.06 NG/ML (ref 0–0.03)
WBC # SPEC AUTO: 17.4 X10(3)/MCL (ref 4–11.5)

## 2023-04-14 PROCEDURE — 27000221 HC OXYGEN, UP TO 24 HOURS

## 2023-04-14 PROCEDURE — 36415 COLL VENOUS BLD VENIPUNCTURE: CPT | Performed by: FAMILY MEDICINE

## 2023-04-14 PROCEDURE — 94761 N-INVAS EAR/PLS OXIMETRY MLT: CPT

## 2023-04-14 PROCEDURE — 94003 VENT MGMT INPAT SUBQ DAY: CPT

## 2023-04-14 PROCEDURE — 84100 ASSAY OF PHOSPHORUS: CPT | Performed by: FAMILY MEDICINE

## 2023-04-14 PROCEDURE — 20000000 HC ICU ROOM

## 2023-04-14 PROCEDURE — 97530 THERAPEUTIC ACTIVITIES: CPT | Mod: CQ

## 2023-04-14 PROCEDURE — 25000003 PHARM REV CODE 250: Performed by: FAMILY MEDICINE

## 2023-04-14 PROCEDURE — 27200966 HC CLOSED SUCTION SYSTEM

## 2023-04-14 PROCEDURE — 63600175 PHARM REV CODE 636 W HCPCS: Performed by: FAMILY MEDICINE

## 2023-04-14 PROCEDURE — 85610 PROTHROMBIN TIME: CPT | Performed by: FAMILY MEDICINE

## 2023-04-14 PROCEDURE — 85730 THROMBOPLASTIN TIME PARTIAL: CPT | Performed by: FAMILY MEDICINE

## 2023-04-14 PROCEDURE — 99900026 HC AIRWAY MAINTENANCE (STAT)

## 2023-04-14 PROCEDURE — 83735 ASSAY OF MAGNESIUM: CPT | Performed by: FAMILY MEDICINE

## 2023-04-14 PROCEDURE — 82803 BLOOD GASES ANY COMBINATION: CPT

## 2023-04-14 PROCEDURE — 37799 UNLISTED PX VASCULAR SURGERY: CPT

## 2023-04-14 PROCEDURE — C9113 INJ PANTOPRAZOLE SODIUM, VIA: HCPCS | Performed by: FAMILY MEDICINE

## 2023-04-14 PROCEDURE — 99900035 HC TECH TIME PER 15 MIN (STAT)

## 2023-04-14 PROCEDURE — 84484 ASSAY OF TROPONIN QUANT: CPT | Performed by: FAMILY MEDICINE

## 2023-04-14 PROCEDURE — 85025 COMPLETE CBC W/AUTO DIFF WBC: CPT | Performed by: FAMILY MEDICINE

## 2023-04-14 PROCEDURE — 83605 ASSAY OF LACTIC ACID: CPT | Performed by: FAMILY MEDICINE

## 2023-04-14 PROCEDURE — 80053 COMPREHEN METABOLIC PANEL: CPT | Performed by: FAMILY MEDICINE

## 2023-04-14 RX ORDER — POTASSIUM CHLORIDE 7.45 MG/ML
10 INJECTION INTRAVENOUS
Status: COMPLETED | OUTPATIENT
Start: 2023-04-14 | End: 2023-04-14

## 2023-04-14 RX ORDER — MAGNESIUM SULFATE HEPTAHYDRATE 40 MG/ML
2 INJECTION, SOLUTION INTRAVENOUS ONCE
Status: COMPLETED | OUTPATIENT
Start: 2023-04-14 | End: 2023-04-14

## 2023-04-14 RX ORDER — POTASSIUM CHLORIDE 7.45 MG/ML
10 INJECTION INTRAVENOUS
Status: DISCONTINUED | OUTPATIENT
Start: 2023-04-14 | End: 2023-04-14

## 2023-04-14 RX ADMIN — PIPERACILLIN AND TAZOBACTAM 4.5 G: 4; .5 INJECTION, POWDER, FOR SOLUTION INTRAVENOUS; PARENTERAL at 02:04

## 2023-04-14 RX ADMIN — POTASSIUM CHLORIDE 10 MEQ: 7.46 INJECTION, SOLUTION INTRAVENOUS at 07:04

## 2023-04-14 RX ADMIN — POTASSIUM CHLORIDE 10 MEQ: 7.46 INJECTION, SOLUTION INTRAVENOUS at 01:04

## 2023-04-14 RX ADMIN — MUPIROCIN: 20 OINTMENT TOPICAL at 09:04

## 2023-04-14 RX ADMIN — SODIUM CHLORIDE: 9 INJECTION, SOLUTION INTRAVENOUS at 11:04

## 2023-04-14 RX ADMIN — ENOXAPARIN SODIUM 50 MG: 60 INJECTION SUBCUTANEOUS at 08:04

## 2023-04-14 RX ADMIN — POTASSIUM CHLORIDE 10 MEQ: 7.46 INJECTION, SOLUTION INTRAVENOUS at 06:04

## 2023-04-14 RX ADMIN — SODIUM CHLORIDE: 9 INJECTION, SOLUTION INTRAVENOUS at 09:04

## 2023-04-14 RX ADMIN — PROPOFOL 20 MCG/KG/MIN: 10 INJECTION, EMULSION INTRAVENOUS at 04:04

## 2023-04-14 RX ADMIN — FAMOTIDINE 20 MG: 10 INJECTION INTRAVENOUS at 09:04

## 2023-04-14 RX ADMIN — MAGNESIUM SULFATE HEPTAHYDRATE 2 G: 40 INJECTION, SOLUTION INTRAVENOUS at 10:04

## 2023-04-14 RX ADMIN — INSULIN ASPART 1 UNITS: 100 INJECTION, SOLUTION INTRAVENOUS; SUBCUTANEOUS at 11:04

## 2023-04-14 RX ADMIN — PIPERACILLIN AND TAZOBACTAM 4.5 G: 4; .5 INJECTION, POWDER, FOR SOLUTION INTRAVENOUS; PARENTERAL at 11:04

## 2023-04-14 RX ADMIN — PIPERACILLIN AND TAZOBACTAM 4.5 G: 4; .5 INJECTION, POWDER, FOR SOLUTION INTRAVENOUS; PARENTERAL at 06:04

## 2023-04-14 RX ADMIN — MUPIROCIN: 20 OINTMENT TOPICAL at 08:04

## 2023-04-14 RX ADMIN — ENOXAPARIN SODIUM 50 MG: 60 INJECTION SUBCUTANEOUS at 09:04

## 2023-04-14 RX ADMIN — POTASSIUM CHLORIDE 10 MEQ: 7.46 INJECTION, SOLUTION INTRAVENOUS at 03:04

## 2023-04-14 RX ADMIN — SODIUM CHLORIDE: 9 INJECTION, SOLUTION INTRAVENOUS at 04:04

## 2023-04-14 RX ADMIN — PANTOPRAZOLE SODIUM 40 MG: 40 INJECTION, POWDER, FOR SOLUTION INTRAVENOUS at 09:04

## 2023-04-14 NOTE — PROGRESS NOTES
[facility]  Progress Note    Patient Name: Lin Flowers  MRN: [id number]  Patient Class: IP- Inpatient   Admission Date: 4/12/2023  Length of Stay: 1 days  Attending Physician: Judy Aguirre MD  Primary Care Provider: Nilton Ndiaye MD        Subjective:     Principal Problem:Severe sepsis with septic shock        HPI:  88 yo female with hx of Dyslipidemia, Dementia, DM, who has not been feeling well reportedly in bed x 1 week.  She lives alone at home, her family was at her home on the evening of 04/12/2023 and pt had witnessed arrest.  Family called 911, did not immediately start CPR, First responders started CPR and pt reportedly intuabated on the scene.   On EMS arrival Pt in cardiorespiratory arrest, intubated continued on CPR another 15 mins, started on levophed in ED, Chest CTA shows bilateral Pes,  Rt sided multiple rib fractures and PTX. Pt unresponsive not on any sedatives, intubated family not at bedside. Reported Full code for now. Details of event from ED and pt family via telephone.  Pt intubated and sedated unable to answer any questions.  Pt CXR shows multiple rib fractures consistent with effective CPR and small pneumothorax.  Chest tube placed by Dr. Roca in ER and CXR pneumothorax at 10%.          Overview/Hospital Course:  04/14/2023 pt sedated on diprivan at 30mg/kg still on AC rate 20 breathing above vent up to 30.  Stopped diprivan and no meaningful responses or able to follow any commands.  Vitals have been stable overnight.  Chest tube dislodged with transfer from ER to ICU and Surgery replaced tube.  CXR this am show 15% PTX with significant subcutaneous emphysema consistent with effective CPR and recent rib fractures from CPR.  Pt does not arouse despite holding sedation.            Review of Systems   Unable to perform ROS: Intubated   Objective:     Vital Signs (Most Recent):  Temp: 97.9 °F (36.6 °C) (04/14/23 0701)  Pulse: 77 (04/14/23  0945)  Resp: (!) 32 (04/14/23 0945)  BP: (!) 102/36 (04/14/23 0800)  SpO2: 99 % (04/14/23 0945)   Vital Signs (24h Range):  Temp:  [97.2 °F (36.2 °C)-98.3 °F (36.8 °C)] 97.9 °F (36.6 °C)  Pulse:  [] 77  Resp:  [17-37] 32  SpO2:  [96 %-100 %] 99 %  BP: ()/(36-92) 102/36  Arterial Line BP: (102-154)/(35-72) 103/35     Weight: 56 kg (123 lb 7.3 oz)  Body mass index is 20.54 kg/m².    Physical Exam  Vitals and nursing note reviewed. Exam conducted with a chaperone present.   Constitutional:       Comments: Intubated and sedated  Held diprivan, no responses to stimulation, unable to arouse, does not follow commands   HENT:      Head: Normocephalic and atraumatic.      Comments: ET tube, OGT in place     Nose: Nose normal.   Eyes:      General: No scleral icterus.     Conjunctiva/sclera: Conjunctivae normal.   Neck:      Vascular: No carotid bruit.   Cardiovascular:      Rate and Rhythm: Normal rate and regular rhythm.      Pulses: Normal pulses.      Heart sounds: Normal heart sounds.   Pulmonary:      Effort: Pulmonary effort is normal.      Breath sounds: Normal breath sounds. No wheezing or rales.      Comments: Diminished RUL otherwise clear  Abdominal:      General: Bowel sounds are normal.      Palpations: Abdomen is soft.   Musculoskeletal:      Cervical back: Neck supple.   Lymphadenopathy:      Cervical: No cervical adenopathy.   Skin:     General: Skin is warm and dry.      Findings: No erythema, lesion or rash.   Neurological:      Comments: Intubated and sedated  Held sedation wtihout improvement in responsiveness   Psychiatric:      Comments: Unable to assess           Significant Labs: All pertinent labs within the past 24 hours have been reviewed.  CBC:   Recent Labs   Lab 04/12/23  2346 04/13/23  0409 04/14/23  0423   WBC 14.8* 19.3* 17.4*   HGB 7.9* 9.1* 7.6*   HCT 25.7* 29.0* 23.2*    303 261       CMP:   Recent Labs   Lab 04/12/23  2346 04/13/23  0409 04/14/23  0423    143 142    K 3.9 3.6 3.3*   CO2 13* 22 21   BUN 15.0 19.0 17.0   CREATININE 0.82 0.75 0.60*   CALCIUM 8.4 8.3* 7.0*   ALBUMIN 2.9* 2.8* 2.4*   BILITOT 0.7 1.1* 0.8   ALKPHOS 85 115 82   * 207* 130*   ALT 91* 103* 69*         Significant Imaging: I have reviewed all pertinent imaging results/findings within the past 24 hours.      Assessment/Plan:      * Severe sepsis with septic shock  This patient does have evidence of infective focus  My overall impression is septic shock due to lactate > 4, MAP < 65 and SBP < 90.  Source: Urinary Tract  Antibiotics given-   Antibiotics (72h ago, onward)    Start     Stop Route Frequency Ordered    04/13/23 0930  mupirocin 2 % ointment         04/18 0859 Nasl 2 times daily 04/13/23 0820    04/13/23 0700  piperacillin-tazobactam (ZOSYN) 4.5 g in dextrose 5 % in water (D5W) 5 % 100 mL IVPB (MB+)         -- IV Every 8 hours (non-standard times) 04/13/23 0620        Latest lactate reviewed-  No results for input(s): LACTATE in the last 72 hours.  Organ dysfunction indicated by Acute kidney injury, Acute respiratory failure and Encephalopathy    Fluid challenge Actual Body weight- Patient will receive 30ml/kg actual body weight to calculate fluid bolus for treatment of septic shock.     Post- resuscitation assessment Yes Perfusion exam was performed within 6 hours of septic shock presentation after bolus shows Adequate tissue perfusion assessed by non-invasive monitoring        blood respiratory and urine cx pending, no growth so far  Iv abx zosyn continues  Levophed weaned off    Acute hypoxemic respiratory failure  Patient with Hypoxic Respiratory failure which is Acute.  she is not on home oxygen. Supplemental oxygen was provided and noted- Vent Mode: A/C  Oxygen Concentration (%):  [28-30] 28  Resp Rate Total:  [18 br/min-36 br/min] 32 br/min  Vt Set:  [325 mL-400 mL] 325 mL  PEEP/CPAP:  [5 cmH20] 5 cmH20  Mean Airway Pressure:  [8.1 rwI72-35 cmH20] 11 cmH20    .   Signs/symptoms of  respiratory failure include- tachypnea, increased work of breathing and respiratory distress. Contributing diagnoses includes - Pulmonary Embolus Labs and images were reviewed. Patient Has recent ABG, which has been reviewed. Will treat underlying causes and adjust management of respiratory failure as follows-  contiue vent and will wean as tolerated    Will wean vent as tolerated    Bilateral pulmonary embolism  Continue lovenox     CIS following      Cardiac arrest    Pt with witnessed arrest  Will start hypothermia protocol    COmplicated UTI  Continue iv zosyn  Blood cx ordered due to severe sepsis      Hypomagnesemia  Magnesium reviewed- Recent Labs   Lab 04/12/23  2346 04/14/23  0423   MG 2.30 1.60*      Will replace magnesium and continue to monitor.           Hypokalemia  Patient with noted electrolyte deficiencies with Hypo-Kalemia. Latest electrolytes have been reviewed and values are   Potassium Level   Date Value Ref Range Status   04/14/2023 3.3 (L) 3.5 - 5.1 mmol/L Final   . Will continue to monitor electrolytes closely. Will replace the affected electrolytes and repeat labs to be done after interventions completed.            VTE Risk Mitigation (From admission, onward)         Ordered     enoxaparin injection 50 mg  Every 12 hours (non-standard times)         04/13/23 0356                Discharge Planning   LANIE: 4/21/2023     Code Status: Full Code   Is the patient medically ready for discharge?:     Reason for patient still in hospital (select all that apply): Patient trending condition, Treatment and Consult recommendations  Discharge Plan A: Long-term acute care facility (LTAC)                  Judy Aguirre MD  Department of Hospital Medicine   [facility]

## 2023-04-14 NOTE — PLAN OF CARE
PT'S SON CAME TO VISIT. ASKED IF PT WAS GETTING BETTER AND PROGRESSING. INFORMED HIM THAT PT WAS OFF OF LEVOPHED AND HAS BEEN OFF OF SEDATION MEDICATION SINCE 1000 AND IS STILL NOT RESPONDING. PT'S SON SAYS THAT HE THINKS SHE IS GOING TO COME OUT OF THIS.       Problem: Adult Inpatient Plan of Care  Goal: Plan of Care Review  4/14/2023 1615 by Amanda Moulton RN  Outcome: Ongoing, Progressing  4/14/2023 1615 by Amanda Moulton RN  Outcome: Ongoing, Not Progressing  Goal: Patient-Specific Goal (Individualized)  4/14/2023 1615 by Amanda Moulton RN  Outcome: Ongoing, Progressing  4/14/2023 1615 by Amanda Moulton RN  Outcome: Ongoing, Not Progressing  Goal: Absence of Hospital-Acquired Illness or Injury  4/14/2023 1615 by Amanda Moulton RN  Outcome: Ongoing, Progressing  4/14/2023 1615 by Amanda Moulton RN  Outcome: Ongoing, Not Progressing  Goal: Optimal Comfort and Wellbeing  4/14/2023 1615 by Amanda Moulton RN  Outcome: Ongoing, Progressing  4/14/2023 1615 by Amanda Moulton RN  Outcome: Ongoing, Not Progressing     Problem: Infection  Goal: Absence of Infection Signs and Symptoms  4/14/2023 1615 by Amanda Moulton RN  Outcome: Ongoing, Progressing  4/14/2023 1615 by Amanda Moulton RN  Outcome: Ongoing, Not Progressing     Problem: Adjustment to Illness (Sepsis/Septic Shock)  Goal: Optimal Coping  4/14/2023 1615 by Amanda Moulton RN  Outcome: Ongoing, Progressing  4/14/2023 1615 by Amanda Moulton RN  Outcome: Ongoing, Not Progressing     Problem: Bleeding (Sepsis/Septic Shock)  Goal: Absence of Bleeding  4/14/2023 1615 by Amanda Moulton RN  Outcome: Ongoing, Progressing  4/14/2023 1615 by Amanda Moulton RN  Outcome: Ongoing, Not Progressing     Problem: Glycemic Control Impaired (Sepsis/Septic Shock)  Goal: Blood Glucose Level Within Desired Range  4/14/2023 1615 by Amanda Moulton RN  Outcome: Ongoing, Progressing  4/14/2023 1615 by Amanda Moulton RN  Outcome: Ongoing, Not Progressing     Problem: Infection Progression (Sepsis/Septic Shock)  Goal: Absence of Infection  Signs and Symptoms  4/14/2023 1615 by Amanda Moulton RN  Outcome: Ongoing, Progressing  4/14/2023 1615 by Amanda Moulton RN  Outcome: Ongoing, Not Progressing     Problem: Nutrition Impaired (Sepsis/Septic Shock)  Goal: Optimal Nutrition Intake  4/14/2023 1615 by Amanda Moulton RN  Outcome: Ongoing, Progressing  4/14/2023 1615 by Amanda Moulton RN  Outcome: Ongoing, Not Progressing     Problem: Fall Injury Risk  Goal: Absence of Fall and Fall-Related Injury  4/14/2023 1615 by Amanda Moulton RN  Outcome: Ongoing, Progressing  4/14/2023 1615 by Amanda Moulton RN  Outcome: Ongoing, Not Progressing     Problem: Skin Injury Risk Increased  Goal: Skin Health and Integrity  4/14/2023 1615 by Amanda Moulton RN  Outcome: Ongoing, Progressing  4/14/2023 1615 by Amanda Moulton RN  Outcome: Ongoing, Not Progressing     Problem: Device-Related Complication Risk (Mechanical Ventilation, Invasive)  Goal: Optimal Device Function  4/14/2023 1615 by Amanda Moulton RN  Outcome: Ongoing, Progressing  4/14/2023 1615 by Amanda Moulton RN  Outcome: Ongoing, Not Progressing     Problem: Nutrition Impairment (Mechanical Ventilation, Invasive)  Goal: Optimal Nutrition Delivery  4/14/2023 1615 by Amanda Moulton RN  Outcome: Ongoing, Progressing  4/14/2023 1615 by Amanda Moulton RN  Outcome: Ongoing, Not Progressing     Problem: Skin and Tissue Injury (Mechanical Ventilation, Invasive)  Goal: Absence of Device-Related Skin and Tissue Injury  4/14/2023 1615 by Amanda Moulton RN  Outcome: Ongoing, Progressing  4/14/2023 1615 by Amanda Moulton RN  Outcome: Ongoing, Not Progressing     Problem: Ventilator-Induced Lung Injury (Mechanical Ventilation, Invasive)  Goal: Absence of Ventilator-Induced Lung Injury  4/14/2023 1615 by Amanda Moulton RN  Outcome: Ongoing, Progressing  4/14/2023 1615 by Amanda Moulton RN  Outcome: Ongoing, Not Progressing

## 2023-04-14 NOTE — ASSESSMENT & PLAN NOTE
Patient with Hypoxic Respiratory failure which is Acute.  she is not on home oxygen. Supplemental oxygen was provided and noted- Vent Mode: A/C  Oxygen Concentration (%):  [28-30] 28  Resp Rate Total:  [18 br/min-36 br/min] 32 br/min  Vt Set:  [325 mL-400 mL] 325 mL  PEEP/CPAP:  [5 cmH20] 5 cmH20  Mean Airway Pressure:  [8.1 baI13-07 cmH20] 11 cmH20    .   Signs/symptoms of respiratory failure include- tachypnea, increased work of breathing and respiratory distress. Contributing diagnoses includes - Pulmonary Embolus Labs and images were reviewed. Patient Has recent ABG, which has been reviewed. Will treat underlying causes and adjust management of respiratory failure as follows-  contiue vent and will wean as tolerated    Will wean vent as tolerated

## 2023-04-14 NOTE — ASSESSMENT & PLAN NOTE
Patient with noted electrolyte deficiencies with Hypo-Kalemia. Latest electrolytes have been reviewed and values are   Potassium Level   Date Value Ref Range Status   04/14/2023 3.3 (L) 3.5 - 5.1 mmol/L Final   . Will continue to monitor electrolytes closely. Will replace the affected electrolytes and repeat labs to be done after interventions completed.

## 2023-04-14 NOTE — PT/OT/SLP PROGRESS
Physical Therapy Treatment    Patient Name:  Lin Flowers   MRN:  [id number]    Recommendations:     Discharge Recommendations: home, rehabilitation facility, nursing facility, skilled  Discharge Equipment Recommendations: none  Barriers to discharge: None    Assessment:     Lin lFowers is a 87 y.o. female admitted with a medical diagnosis of Severe sepsis with septic shock.  She presents with the following impairments/functional limitations: weakness, impaired functional mobility, decreased upper extremity function, decreased lower extremity function .    Rehab Prognosis: Poor; patient would benefit from acute skilled PT services to address these deficits and reach maximum level of function.    Recent Surgery: * No surgery found *      Plan:     During this hospitalization, patient to be seen daily to address the identified rehab impairments via gait training, therapeutic activities, therapeutic exercises and progress toward the following goals:    Plan of Care Expires:  05/13/23    Subjective     Chief Complaint: pt on vent and sedated  Patient/Family Comments/goals: improve ROM.  Pain/Comfort:  Pain Rating 1: 0/10      Objective:     Communicated with nsg prior to session.  Patient found supine with   upon PT entry to room.     General Precautions: Standard, fall  Orthopedic Precautions: N/A  Braces: N/A  Respiratory Status: on vent     Functional Mobility:        AM-PAC 6 CLICK MOBILITY  Turning over in bed (including adjusting bedclothes, sheets and blankets)?: 1  Sitting down on and standing up from a chair with arms (e.g., wheelchair, bedside commode, etc.): 1  Moving from lying on back to sitting on the side of the bed?: 1  Moving to and from a bed to a chair (including a wheelchair)?: 1  Need to walk in hospital room?: 1  Climbing 3-5 steps with a railing?: 1  Basic Mobility Total Score: 6       Treatment & Education:  PROM to bilateral UE/LE's x 10 x 3 to improve ROM and decrease risk of  contractures.    Patient left supine with all lines intact, call button in reach, and bed alarm on..    GOALS:   Multidisciplinary Problems       Physical Therapy Goals          Problem: Physical Therapy    Goal Priority Disciplines Outcome Goal Variances Interventions   Physical Therapy Goal     PT, PT/OT Ongoing, Progressing     Description: Goals to be met by: discharge     Patient will increase functional independence with mobility by performin. Supine to sit with Stand-by Assistance  2. Sit to stand transfer with Stand-by Assistance                         Time Tracking:     PT Received On: 23  PT Start Time: 645     PT Stop Time: 655  PT Total Time (min): 10 min     Billable Minutes: Therapeutic Activity 10    Treatment Type: Treatment  PT/PTA: PTA     Number of PTA visits since last PT visit: 2023

## 2023-04-14 NOTE — ASSESSMENT & PLAN NOTE
This patient does have evidence of infective focus  My overall impression is septic shock due to lactate > 4, MAP < 65 and SBP < 90.  Source: Urinary Tract  Antibiotics given-   Antibiotics (72h ago, onward)    Start     Stop Route Frequency Ordered    04/13/23 0930  mupirocin 2 % ointment         04/18 0859 Nasl 2 times daily 04/13/23 0820    04/13/23 0700  piperacillin-tazobactam (ZOSYN) 4.5 g in dextrose 5 % in water (D5W) 5 % 100 mL IVPB (MB+)         -- IV Every 8 hours (non-standard times) 04/13/23 0620        Latest lactate reviewed-  No results for input(s): LACTATE in the last 72 hours.  Organ dysfunction indicated by Acute kidney injury, Acute respiratory failure and Encephalopathy    Fluid challenge Actual Body weight- Patient will receive 30ml/kg actual body weight to calculate fluid bolus for treatment of septic shock.     Post- resuscitation assessment Yes Perfusion exam was performed within 6 hours of septic shock presentation after bolus shows Adequate tissue perfusion assessed by non-invasive monitoring        blood respiratory and urine cx pending, no growth so far  Iv abx zosyn continues  Levophed weaned off

## 2023-04-14 NOTE — PROGRESS NOTES
Inpatient Nutrition Assessment    Admit Date: 4/12/2023   Total duration of encounter: 2 days     Nutrition Recommendation/Prescription     Due to patient intubated, recommend consider Nutren 1.5 @ 20 ml/hr. Once patient tolerates and electrolytes are in an acceptable range advance to goal rate of 40 ml/hr (1380 kcal, 63 gm pro, 703 ml H2O) w/ FWF @ 30 ml/hr (690 mL H2O).    If able to extubate patient ADAT to Low Na with NCS modifier.    RD to monitor patients PO Intake, Weight, Labs, Wound healing and adjust MNT as needed.       Communication of Recommendations: reviewed with nurse    Nutrition Assessment     Malnutrition Assessment/Nutrition-Focused Physical Exam    Not appropriate at this time.     Chart Review    Reason Seen: physician consult for vent    Malnutrition Screening Tool Results   Have you recently lost weight without trying?: Unsure (UNKNOWN)  Have you been eating poorly because of a decreased appetite?: Yes (PER FAMILY)   MST Score: 3     Diagnosis:  Severe Sepsis with Septic Shock, Acute Hypoxemic Respiratory Failure, Bilateral Pulmonary Embolism, Cardiac Arrest, Complicated UTI.    Relevant Medical History: Alzheimer's Disease, Asthma, High Cholesterol, Kidney Stones.    Nutrition-Related Medications: Zosyn, Levophed, Propofol- (at time of assessment it had been paused for 2 hours), IVF @ 125 ml/hr.   Calorie Containing IV Medications:  Diprivan paused in attempt to wean patient.     Nutrition-Related Labs:  (4/14): WBC- 17.4H, RBC- 2.5L, HGB- 7.6L, HCT- 23.2L, K- 3.3L, CL- 115H, Glucose- 136H, Ca- 7.0L, CrCl- 60.6, Mag- 1.6L, AST- 130H, ALT- 69H, POC Glucose- 137,128,274.    Diet/PN Order: Diet NPO  Oral Supplement Order: none  Tube Feeding Order: none  Appetite/Oral Intake: NPO/NPO  Factors Affecting Nutritional Intake: on mechanical ventilation  Food/Temple/Cultural Preferences: unable to obtain  Food Allergies: no known food allergies    Skin Integrity: incision, bruised  "(ecchymotic)  Wound(s):       Comments    (): Patient intubated. Per nurse patient was sedated but propofol paused in attempt to wean patient. Even if weaned its likely patient will need TF. GI: WDL/HYPOACTIVE, BRDN:12, VENT, NO EDEMA, 24 HR I/O: 3013/1139.    Anthropometrics    Height: 5' 5" (165.1 cm)    Last Weight: 56 kg (123 lb 7.3 oz) (23 0912) Weight Method: Bed Scale  BMI (Calculated): 20.5  BMI Classification: underweight (BMI less than 22 if >65 years of age)     Ideal Body Weight (IBW), Female: 125 lb     % Ideal Body Weight, Female (lb): 98.77 %                             Usual Weight Provided By: EMR weight history    Wt Readings from Last 5 Encounters:   23 56 kg (123 lb 7.3 oz)   23 52.5 kg (115 lb 11.2 oz)     Weight Change(s) Since Admission:  Admit Weight: 50.8 kg (112 lb) (23 2345)      Estimated Needs    Weight Used For Calorie Calculations: 56 kg (123 lb 7.3 oz)  Energy Calorie Requirements (kcal): 5874-1129 KCAL (26-30 KCAL/KG CBW)  Energy Need Method: Kcal/kg  Weight Used For Protein Calculations: 56 kg (123 lb 7.3 oz)  Protein Requirements: 56-67 GM PRO (1.0-1.2 GM/KG CBW)  Fluid Requirements (mL): 1400 ML H2O (25 ML/KG CBW)  Temp (24hrs), Av °F (36.7 °C), Min:97.3 °F (36.3 °C), Max:98.3 °F (36.8 °C)         Enteral Nutrition    Patient not receiving enteral nutrition at this time.    Parenteral Nutrition    Patient not receiving parenteral nutrition support at this time.    Evaluation of Received Nutrient Intake    Calories: not meeting estimated needs  Protein: not meeting estimated needs    Patient Education    Not applicable.    Nutrition Diagnosis     PES: Inadequate energy intake related to inability to consume sufficient nutrients as evidenced by estimated energy intake less than estimated energy needs. (new)    Interventions/Goals     Intervention(s): modified composition of enteral nutrition, modified rate of enteral nutrition, and collaboration with " other providers  Goal: Meet at least 75% of nutritional needs by discharge   (new)    Monitoring & Evaluation     Dietitian will monitor energy intake, enteral nutrition intake, weight, electrolyte/renal panel, glucose/endocrine profile, gastrointestinal profile, and vent status .  Nutrition Risk/Follow-Up: high (follow-up in 1-4 days)   Please consult if re-assessment needed sooner.

## 2023-04-14 NOTE — NURSING
Received result of critical troponin, result of 1.06, from lab expected result, md will review on am rounds

## 2023-04-14 NOTE — HOSPITAL COURSE
04/14/2023 pt sedated on diprivan at 30mg/kg still on AC rate 20 breathing above vent up to 30.  Stopped diprivan and no meaningful responses or able to follow any commands.  Vitals have been stable overnight.  Chest tube dislodged with transfer from ER to ICU and Surgery replaced tube.  CXR this am show 15% PTX with significant subcutaneous emphysema consistent with effective CPR and recent rib fractures from CPR.  Pt does not arouse despite holding sedation.    04/15/2023 pt off sedation for an extended time yesterday had to start due to neurologic twitches overnight, stopped again at 0600 this am, poor neurologic responses, she breathes over vent, however does not arouse, no purposeful movement, pupils are sluggish.  Pt was down for at least 15 minutes prior to CPR being started.    04/16/2023 pt off sedation x 24 hrs no meaningful responses, family in agreement wants to withdraw care of vent later today.  She does not arouse, does not follow any commands, pupils are minimally reactive and sluggish.    04/17/2023 pt extubated and worsening no neurologic responses of meaning ful nature, family has met with hospice and desires hospice care will transfer to [facility]

## 2023-04-14 NOTE — SUBJECTIVE & OBJECTIVE
Review of Systems   Unable to perform ROS: Intubated   Objective:     Vital Signs (Most Recent):  Temp: 97.9 °F (36.6 °C) (04/14/23 0701)  Pulse: 77 (04/14/23 0945)  Resp: (!) 32 (04/14/23 0945)  BP: (!) 102/36 (04/14/23 0800)  SpO2: 99 % (04/14/23 0945)   Vital Signs (24h Range):  Temp:  [97.2 °F (36.2 °C)-98.3 °F (36.8 °C)] 97.9 °F (36.6 °C)  Pulse:  [] 77  Resp:  [17-37] 32  SpO2:  [96 %-100 %] 99 %  BP: ()/(36-92) 102/36  Arterial Line BP: (102-154)/(35-72) 103/35     Weight: 56 kg (123 lb 7.3 oz)  Body mass index is 20.54 kg/m².    Physical Exam  Vitals and nursing note reviewed. Exam conducted with a chaperone present.   Constitutional:       Comments: Intubated and sedated  Held diprivan, no responses to stimulation, unable to arouse, does not follow commands   HENT:      Head: Normocephalic and atraumatic.      Comments: ET tube, OGT in place     Nose: Nose normal.   Eyes:      General: No scleral icterus.     Conjunctiva/sclera: Conjunctivae normal.   Neck:      Vascular: No carotid bruit.   Cardiovascular:      Rate and Rhythm: Normal rate and regular rhythm.      Pulses: Normal pulses.      Heart sounds: Normal heart sounds.   Pulmonary:      Effort: Pulmonary effort is normal.      Breath sounds: Normal breath sounds. No wheezing or rales.      Comments: Diminished RUL otherwise clear  Abdominal:      General: Bowel sounds are normal.      Palpations: Abdomen is soft.   Musculoskeletal:      Cervical back: Neck supple.   Lymphadenopathy:      Cervical: No cervical adenopathy.   Skin:     General: Skin is warm and dry.      Findings: No erythema, lesion or rash.   Neurological:      Comments: Intubated and sedated  Held sedation wtihout improvement in responsiveness   Psychiatric:      Comments: Unable to assess           Significant Labs: All pertinent labs within the past 24 hours have been reviewed.  CBC:   Recent Labs   Lab 04/12/23  2346 04/13/23  0409 04/14/23  0423   WBC 14.8* 19.3*  17.4*   HGB 7.9* 9.1* 7.6*   HCT 25.7* 29.0* 23.2*    303 261       CMP:   Recent Labs   Lab 04/12/23  2346 04/13/23  0409 04/14/23  0423    143 142   K 3.9 3.6 3.3*   CO2 13* 22 21   BUN 15.0 19.0 17.0   CREATININE 0.82 0.75 0.60*   CALCIUM 8.4 8.3* 7.0*   ALBUMIN 2.9* 2.8* 2.4*   BILITOT 0.7 1.1* 0.8   ALKPHOS 85 115 82   * 207* 130*   ALT 91* 103* 69*         Significant Imaging: I have reviewed all pertinent imaging results/findings within the past 24 hours.

## 2023-04-14 NOTE — PROGRESS NOTES
[facility]  Cardiology  Consult Note    Patient Name: Lin Flowers  MRN: [id number]  Admission Date: 4/12/2023  Hospital Length of Stay: 1 days  Code Status: Full Code   Attending Provider: Judy Aguirre MD   Consulting Provider: TREVOR Patel  Primary Care Physician: Nilton Ndiaye MD  Principal Problem:Severe sepsis with septic shock    Patient information was obtained from patient, relative(s), ER records, and primary team.     Subjective:     Chief Complaint:  cardiac arrest    HPI: 87 year old female patient unknown to [facility]. According to the family had been feeling ill for the past few weeks with limited mobility. She was sitting in her recliner and became unresponsive which was witnessed by family. EMS was called and arrived within approximately 15 minutes (no CPR performed prior to their arrival). First responders started CPR and pt reportedly intuabated on the scene.   On EMS arrival Pt in cardiorespiratory arrest, intubated continued on CPR another 15 mins, started on levophed in ED, Chest CTA shows bilateral PEs,  Rt sided multiple rib fractures and PTX consistent with effective CPR.     4/13: Currently seen at bedside.  Patient is intubated and sedated.  EKG revealed sinus tachycardia.  CT scan reviewed.  No evidence of saddle PE.  No evidence of left main or right main pulmonary embolism.  Echocardiogram reveals preserved biventricular function.  No evidence of Velazquez sign.  Troponin down trending.    Currently on Levophed drip.  Dose being weaned down.  Currently on propofol drip.  Not following commands.    4/14: VSS, remains intubated and sedated. No longer on Levo. Chest tube replaced yesterday. Hgb down to 7.6 from 9.1    PMH: Dyslipidemia, Dementia, DM      Past Medical History:   Diagnosis Date    Alzheimer's disease, unspecified (CODE)     Asthma     High cholesterol     Kidney stones        History reviewed. No pertinent surgical history.    Review of patient's  allergies indicates:  No Known Allergies    No current facility-administered medications on file prior to encounter.     Current Outpatient Medications on File Prior to Encounter   Medication Sig    donepeziL (ARICEPT) 10 MG tablet Take 10 mg by mouth every evening.    montelukast (SINGULAIR) 10 mg tablet Take 10 mg by mouth once daily.    multivitamin capsule Take 1 capsule by mouth once daily.    pantoprazole (PROTONIX) 40 MG tablet Take 40 mg by mouth once daily.    SYMBICORT 160-4.5 mcg/actuation HFAA 2 puffs 2 (two) times daily.    vitamin E 100 UNIT capsule Take 100 Units by mouth once daily.     Family History    None       Tobacco Use    Smoking status: Never    Smokeless tobacco: Never   Substance and Sexual Activity    Alcohol use: Never    Drug use: Never    Sexual activity: Not on file       Review of Systems   Unable to perform ROS: Intubated     Objective:     Vital Signs (Most Recent):  Temp: 97.9 °F (36.6 °C) (04/14/23 0701)  Pulse: 77 (04/14/23 0726)  Resp: (!) 26 (04/14/23 0726)  BP: 109/62 (04/13/23 2000)  SpO2: 99 % (04/14/23 0726)   Vital Signs (24h Range):  Temp:  [97.2 °F (36.2 °C)-98.3 °F (36.8 °C)] 97.9 °F (36.6 °C)  Pulse:  [] 77  Resp:  [17-37] 26  SpO2:  [96 %-100 %] 99 %  BP: ()/(52-92) 109/62  Arterial Line BP: (102-154)/(44-72) 134/61     Weight: 56 kg (123 lb 7.3 oz)  Body mass index is 20.54 kg/m².    SpO2: 99 %         Intake/Output Summary (Last 24 hours) at 4/14/2023 0835  Last data filed at 4/14/2023 0800  Gross per 24 hour   Intake 3013.3 ml   Output 1139 ml   Net 1874.3 ml         Lines/Drains/Airways       Drain  Duration                  NG/OG Tube 04/13/23 0835 Cedar sump 18 Fr. Center mouth 1 day         Urethral Catheter 04/12/23 2334 16 Fr. 1 day         Chest Tube 04/13/23 1300 Tube - 1 Right Fourth intercostal space 28 Fr. <1 day              Airway  Duration                  Airway - Non-Surgical 04/12/23 Endotracheal Tube 2 days              Arterial Line   Duration             Arterial Line 04/13/23 1331 Left Radial <1 day              Peripheral Intravenous Line  Duration                  Peripheral IV - Single Lumen 04/12/23 2332 16 G Left Antecubital 1 day         Peripheral IV - Single Lumen 04/12/23 2333 18 G Right Antecubital 1 day         Peripheral IV - Single Lumen 04/13/23 0055 22 G Right Hand 1 day                    Significant Labs:  Recent Results (from the past 72 hour(s))   Comprehensive metabolic panel    Collection Time: 04/12/23 11:46 PM   Result Value Ref Range    Sodium Level 140 135 - 145 mmol/L    Potassium Level 3.9 3.5 - 5.1 mmol/L    Chloride 112 (H) 98 - 110 mmol/L    Carbon Dioxide 13 (L) 21 - 32 mmol/L    Glucose Level 356 (H) 70 - 115 mg/dL    Blood Urea Nitrogen 15.0 7.0 - 20.0 mg/dL    Creatinine 0.82 0.66 - 1.25 mg/dL    Calcium Level Total 8.4 8.4 - 10.2 mg/dL    Protein Total 6.3 6.3 - 8.2 gm/dL    Albumin Level 2.9 (L) 3.4 - 5.0 g/dL    Globulin 3.4 2.0 - 3.9 gm/dL    Albumin/Globulin Ratio 0.9 ratio    Bilirubin Total 0.7 0.0 - 1.0 mg/dL    Alkaline Phosphatase 85 50 - 144 unit/L    Alanine Aminotransferase 91 (H) 1 - 45 unit/L    Aspartate Aminotransferase 152 (H) 14 - 36 unit/L    eGFR 70 mls/min/1.73/m2    Anion Gap 15.0 (H) 2.0 - 13.0 mEq/L    BUN/Creatinine Ratio 18 12 - 20   NT-Pro Natriuretic Peptide    Collection Time: 04/12/23 11:46 PM   Result Value Ref Range    ProBNP 1,040 (H) 10 - 450 PG/ML   Troponin I    Collection Time: 04/12/23 11:46 PM   Result Value Ref Range    Troponin-I 0.305 (HH) 0.000 - 0.034 ng/mL   D dimer, quantitative    Collection Time: 04/12/23 11:46 PM   Result Value Ref Range    D-Dimer >15.00 (H) 0.19 - 0.50 mg/L   Lactic acid, plasma    Collection Time: 04/12/23 11:46 PM   Result Value Ref Range    Lactic Acid Level 9.6 (HH) 0.4 - 2.0 mmol/L   Magnesium    Collection Time: 04/12/23 11:46 PM   Result Value Ref Range    Magnesium Level 2.30 1.80 - 2.40 mg/dL   CBC with Differential    Collection  Time: 04/12/23 11:46 PM   Result Value Ref Range    WBC 14.8 (H) 4.0 - 11.5 x10(3)/mcL    RBC 2.53 (L) 4.00 - 5.10 x10(6)/mcL    Hgb 7.9 (L) 11.8 - 16.0 g/dL    Hct 25.7 (L) 36.0 - 48.0 %    .6 (H) 79.0 - 99.0 fL    MCH 31.2 27.0 - 34.0 pg    MCHC 30.7 (L) 31.0 - 37.0 g/dL    RDW 13.8 11.0 - 14.5 %    Platelet 284 140 - 371 x10(3)/mcL    MPV 10.2 9.4 - 12.4 fL    IG# 0.90 (H) 0.0001 - 0.031 x10(3)/mcL    IG% 6.1 (H) 0 - 0.5 %    NRBC% 0.3 0 - 1 %   Protime-INR    Collection Time: 04/12/23 11:46 PM   Result Value Ref Range    PT 12.3 (H) 9.3 - 11.9 seconds    INR 1.22 <=5.00   APTT    Collection Time: 04/12/23 11:46 PM   Result Value Ref Range    PTT 37.7 (H) 23.0 - 29.4 seconds   Manual Differential    Collection Time: 04/12/23 11:46 PM   Result Value Ref Range    Neut Man 62 47 - 80 %    Band Neutrophil Man 8 0 - 11 %    Lymph Man 27 13 - 40 %    Monocyte Man 3 2 - 11 %    Abs Neut calc 10.36 (H) 2.1 - 9.2 x10(3)/mcL    Abs Mono 0.444 0.1 - 1.3 x10(3)/mcL    Abs Lymp 3.996 0.6 - 4.6 x10(3)/mcL    RBC Morph Abnormal (A) Normal    Anisocyte 1+ (A) (none)    Poik 1+ (A) (none)    Macrocyte 1+ (A) (none)    Lakia Cells 1+ (A) (none)    Platelet Est Normal Normal, Adequate   RT Blood Gas    Collection Time: 04/12/23 11:47 PM   Result Value Ref Range    Sample Type Arterial Blood     pH, Blood gas 7.109 (LL) 7.350 - 7.450    pCO2, Blood gas 39.4 35.0 - 45.0 mmHg    pO2, Blood gas 487.0 (H) 80.0 - 105.0 mmHg    sO2, Blood gas 100.0 95.0 - 100.0 %    HCO3, Blood gas 12.2 (L) 22.0 - 26.0 mmol/L    Base Excess, Blood gas -15.90 (L) -2.00 - 2.00 mmol/L    FIO2, Blood gas 100 %    Vt 400 ml    RR 16 b/min    PEEP 5.0 cmH2O    MODE AC     Notified Time 2,352     Notified DR PARENT     Notified By ANA SHAHID, BLAISE    Urinalysis Catheterized    Collection Time: 04/12/23 11:58 PM   Result Value Ref Range    Color, UA Yellow Yellow, Light-Yellow, Dark Yellow, Barbara, Straw    Appearance, UA Cloudy (A) Clear    Specific  Gravity, UA 1.025     pH, UA 6.0 5.0 - 8.5    Protein,  (A) Negative mg/dL    Glucose,  (A) Negative, Normal mg/dL    Ketones, UA Negative Negative mg/dL    Blood, UA Large (A) Negative unit/L    Bilirubin, UA Negative Negative mg/dL    Urobilinogen, UA 1.0 0.2, 1.0, Normal mg/dL    Nitrites, UA Negative Negative    Leukocyte Esterase, UA Moderate (A) Negative unit/L   Urinalysis, Microscopic    Collection Time: 04/12/23 11:58 PM   Result Value Ref Range    Bacteria, UA Trace (A) None Seen, Rare, Occasional /HPF    RBC, UA 0-2 None Seen, 0-2, 3-5, 0-5 /HPF    WBC, UA >100 (A) None Seen, 0-2, 3-5, 0-5 /HPF    Squamous Epithelial Cells, UA Few (A) None Seen, Rare, Occasional, Occ /HPF   COVID-19 Rapid Screening    Collection Time: 04/12/23 11:59 PM   Result Value Ref Range    SARS COV-2 MOLECULAR Negative Negative   Respiratory Culture    Collection Time: 04/13/23 12:03 AM    Specimen: Sputum, Expectorated   Result Value Ref Range    Respiratory Culture No Growth At 24 Hours    Lactic Acid, Plasma    Collection Time: 04/13/23  1:49 AM   Result Value Ref Range    Lactic Acid Level 7.3 (HH) 0.4 - 2.0 mmol/L   Lactic Acid, Plasma    Collection Time: 04/13/23  4:09 AM   Result Value Ref Range    Lactic Acid Level 5.8 (HH) 0.4 - 2.0 mmol/L   Troponin I    Collection Time: 04/13/23  4:09 AM   Result Value Ref Range    Troponin-I 2.060 (HH) 0.000 - 0.034 ng/mL   Comprehensive metabolic panel    Collection Time: 04/13/23  4:09 AM   Result Value Ref Range    Sodium Level 143 135 - 145 mmol/L    Potassium Level 3.6 3.5 - 5.1 mmol/L    Chloride 108 98 - 110 mmol/L    Carbon Dioxide 22 21 - 32 mmol/L    Glucose Level 349 (H) 70 - 115 mg/dL    Blood Urea Nitrogen 19.0 7.0 - 20.0 mg/dL    Creatinine 0.75 0.66 - 1.25 mg/dL    Calcium Level Total 8.3 (L) 8.4 - 10.2 mg/dL    Protein Total 6.0 (L) 6.3 - 8.2 gm/dL    Albumin Level 2.8 (L) 3.4 - 5.0 g/dL    Globulin 3.2 2.0 - 3.9 gm/dL    Albumin/Globulin Ratio 0.9 ratio     Bilirubin Total 1.1 (H) 0.0 - 1.0 mg/dL    Alkaline Phosphatase 115 50 - 144 unit/L    Alanine Aminotransferase 103 (H) 1 - 45 unit/L    Aspartate Aminotransferase 207 (H) 14 - 36 unit/L    eGFR 78 mls/min/1.73/m2    Anion Gap 13.0 2.0 - 13.0 mEq/L    BUN/Creatinine Ratio 25 (H) 12 - 20   CBC with Differential    Collection Time: 04/13/23  4:09 AM   Result Value Ref Range    WBC 19.3 (H) 4.0 - 11.5 x10(3)/mcL    RBC 3.00 (L) 4.00 - 5.10 x10(6)/mcL    Hgb 9.1 (L) 11.8 - 16.0 g/dL    Hct 29.0 (L) 36.0 - 48.0 %    MCV 96.7 79.0 - 99.0 fL    MCH 30.3 27.0 - 34.0 pg    MCHC 31.4 31.0 - 37.0 g/dL    RDW 13.7 11.0 - 14.5 %    Platelet 303 140 - 371 x10(3)/mcL    MPV 9.6 9.4 - 12.4 fL    IG# 0.13 (H) 0.0001 - 0.031 x10(3)/mcL    IG% 0.7 (H) 0 - 0.5 %   Manual Differential    Collection Time: 04/13/23  4:09 AM   Result Value Ref Range    Neut Man 80 47 - 80 %    Band Neutrophil Man 4 0 - 11 %    Lymph Man 4 (L) 13 - 40 %    Monocyte Man 12 (H) 2 - 11 %    Abs Neut calc 16.212 (H) 2.1 - 9.2 x10(3)/mcL    Abs Mono 2.316 (H) 0.1 - 1.3 x10(3)/mcL    Abs Lymp 0.772 0.6 - 4.6 x10(3)/mcL    RBC Morph Normal Normal    Platelet Est Normal Normal, Adequate   CK    Collection Time: 04/13/23  4:09 AM   Result Value Ref Range    Creatine Kinase 687 (H) 30 - 135 U/L   Phosphorus    Collection Time: 04/13/23  4:09 AM   Result Value Ref Range    Phosphorus Level 5.1 (H) 2.5 - 4.9 mg/dL   RT Blood Gas    Collection Time: 04/13/23  4:40 AM   Result Value Ref Range    Sample Type Arterial Blood     pH, Blood gas 7.409 7.350 - 7.450    pCO2, Blood gas 33.8 (L) 35.0 - 45.0 mmHg    pO2, Blood gas 124.0 (H) 80.0 - 105.0 mmHg    CO Hgb 1.2 0.0 - 1.5 %    sO2, Blood gas 99.8 95.0 - 100.0 %    HCO3, Blood gas 22.1 22.0 - 26.0 mmol/L    Base Excess, Blood gas -2.80 (L) -2.00 - 2.00 mmol/L    FIO2, Blood gas 35.0 %    Vt 400 ml    RR 16 b/min    PEEP 5.0 cmH2O    MODE AC     IP 0.0 cmH2O    PIP 0 cmH20    Notified  Parent     Notified By Ioana RT     Echo    Collection Time: 04/13/23  9:00 AM   Result Value Ref Range    TDI SEPTAL 0.07 m/s    LV LATERAL E/E' RATIO 8.71 m/s    LV SEPTAL E/E' RATIO 8.71 m/s    IVC diameter 2.08 cm    EF 63 %    Left Ventricular Outflow Tract Mean Velocity 0.51 cm/s    Left Ventricular Outflow Tract Mean Gradient 1.20 mmHg    AORTIC VALVE CUSP SEPERATION 1.07 cm    TDI LATERAL 0.07 m/s    LVIDd 4.04 3.5 - 6.0 cm    IVS 0.90 0.6 - 1.1 cm    Posterior Wall 0.69 0.6 - 1.1 cm    LVIDs 2.67 2.1 - 4.0 cm    FS 34 28 - 44 %    Ascending aorta 1.92 cm    LV mass 94.21 g    LA size 2.27 cm    TAPSE 1.38 cm    Left Ventricle Relative Wall Thickness 0.34 cm    AV mean gradient 4 mmHg    AV valve area 1.38 cm2    AV Velocity Ratio 0.53     AV index (prosthetic) 0.64     E/A ratio 0.79     Mean e' 0.07 m/s    E wave deceleration time 247.00 msec    LVOT diameter 1.65 cm    LVOT area 2.1 cm2    LVOT peak jak 0.78 m/s    LVOT peak VTI 14.70 cm    Ao peak jak 1.48 m/s    Ao VTI 22.8 cm    LVOT stroke volume 31.42 cm3    AV peak gradient 9 mmHg    E/E' ratio 8.71 m/s    MV Peak E Jak 0.61 m/s    TR Max Jak 2.86 m/s    MV Peak A Jak 0.77 m/s    LV Systolic Volume 26.30 mL    LV Diastolic Volume 71.70 mL    Triscuspid Valve Regurgitation Peak Gradient 33 mmHg    LA volume (mod) 20.80 cm3    LV Systolic Volume Index 16.3 mL/m2    LV Diastolic Volume Index 44.53 mL/m2    LV Mass Index 59 g/m2    LA Volume Index (Mod) 12.9 mL/m2   Type & Screen    Collection Time: 04/13/23  9:33 AM   Result Value Ref Range    Specimen Outdate 04/16/2023 23:59     ABO and RH O POS     Antibody Screen NEG    Lactic Acid, Plasma    Collection Time: 04/13/23  9:33 AM   Result Value Ref Range    Lactic Acid Level 6.4 (HH) 0.4 - 2.0 mmol/L   POCT glucose    Collection Time: 04/13/23 10:54 AM   Result Value Ref Range    POCT Glucose 369 (H) 70 - 110 mg/dL   Lactic Acid, Plasma    Collection Time: 04/13/23 11:45 AM   Result Value Ref Range    Lactic Acid Level 6.2 (HH) 0.4 -  2.0 mmol/L   Lactic Acid, Plasma    Collection Time: 04/13/23  3:16 PM   Result Value Ref Range    Lactic Acid Level 6.1 (HH) 0.4 - 2.0 mmol/L   Troponin I    Collection Time: 04/13/23  5:23 PM   Result Value Ref Range    Troponin-I 1.540 (HH) 0.000 - 0.034 ng/mL   CK-MB    Collection Time: 04/13/23  5:23 PM   Result Value Ref Range    Creatine Kinase MB 26.6 (H) 0.0 - 3.38 ng/mL   POCT glucose    Collection Time: 04/13/23  5:36 PM   Result Value Ref Range    POCT Glucose 274 (H) 70 - 110 mg/dL   Lactic Acid, Plasma    Collection Time: 04/13/23  5:43 PM   Result Value Ref Range    Lactic Acid Level 5.4 (HH) 0.4 - 2.0 mmol/L   Lactic Acid, Plasma    Collection Time: 04/13/23  7:41 PM   Result Value Ref Range    Lactic Acid Level 2.3 (H) 0.4 - 2.0 mmol/L   Lactic Acid, Plasma    Collection Time: 04/13/23  9:46 PM   Result Value Ref Range    Lactic Acid Level 1.8 0.4 - 2.0 mmol/L   POCT glucose    Collection Time: 04/13/23 11:26 PM   Result Value Ref Range    POCT Glucose 128 (H) 70 - 110 mg/dL   Comprehensive metabolic panel    Collection Time: 04/14/23  4:23 AM   Result Value Ref Range    Sodium Level 142 135 - 145 mmol/L    Potassium Level 3.3 (L) 3.5 - 5.1 mmol/L    Chloride 115 (H) 98 - 110 mmol/L    Carbon Dioxide 21 21 - 32 mmol/L    Glucose Level 136 (H) 70 - 115 mg/dL    Blood Urea Nitrogen 17.0 7.0 - 20.0 mg/dL    Creatinine 0.60 (L) 0.66 - 1.25 mg/dL    Calcium Level Total 7.0 (L) 8.4 - 10.2 mg/dL    Protein Total 5.3 (L) 6.3 - 8.2 gm/dL    Albumin Level 2.4 (L) 3.4 - 5.0 g/dL    Globulin 2.9 2.0 - 3.9 gm/dL    Albumin/Globulin Ratio 0.8 ratio    Bilirubin Total 0.8 0.0 - 1.0 mg/dL    Alkaline Phosphatase 82 50 - 144 unit/L    Alanine Aminotransferase 69 (H) 1 - 45 unit/L    Aspartate Aminotransferase 130 (H) 14 - 36 unit/L    eGFR 88 mls/min/1.73/m2    Anion Gap 6.0 2.0 - 13.0 mEq/L    BUN/Creatinine Ratio 28 (H) 12 - 20   Lactic acid, plasma    Collection Time: 04/14/23  4:23 AM   Result Value Ref Range     Lactic Acid Level 1.3 0.4 - 2.0 mmol/L   Troponin I    Collection Time: 04/14/23  4:23 AM   Result Value Ref Range    Troponin-I 1.060 (HH) 0.000 - 0.034 ng/mL   Magnesium    Collection Time: 04/14/23  4:23 AM   Result Value Ref Range    Magnesium Level 1.60 (L) 1.80 - 2.40 mg/dL   Phosphorus    Collection Time: 04/14/23  4:23 AM   Result Value Ref Range    Phosphorus Level 3.4 2.5 - 4.9 mg/dL   APTT    Collection Time: 04/14/23  4:23 AM   Result Value Ref Range    PTT 36.5 (H) 23.0 - 29.4 seconds   Protime-INR    Collection Time: 04/14/23  4:23 AM   Result Value Ref Range    PT 11.2 9.3 - 11.9 seconds    INR 1.10 <=5.00   CBC with Differential    Collection Time: 04/14/23  4:23 AM   Result Value Ref Range    WBC 17.4 (H) 4.0 - 11.5 x10(3)/mcL    RBC 2.50 (L) 4.00 - 5.10 x10(6)/mcL    Hgb 7.6 (L) 11.8 - 16.0 g/dL    Hct 23.2 (L) 36.0 - 48.0 %    MCV 92.8 79.0 - 99.0 fL    MCH 30.4 27.0 - 34.0 pg    MCHC 32.8 31.0 - 37.0 g/dL    RDW 13.8 11.0 - 14.5 %    Platelet 261 140 - 371 x10(3)/mcL    MPV 10.1 9.4 - 12.4 fL    Neut % 86.9 (H) 37 - 73 %    Lymph % 8.0 (L) 20 - 55 %    Mono % 4.3 (L) 4.7 - 12.5 %    Eos % 0.0 (L) 0.7 - 7 %    Basophil % 0.2 0.1 - 1.2 %    Lymph # 1.39 1.16 - 3.74 x10(3)/mcL    Neut # 15.09 (H) 1.56 - 6.13 x10(3)/mcL    Mono # 0.75 (H) 0.24 - 0.36 x10(3)/mcL    Eos # 0.00 (L) 0.04 - 0.36 x10(3)/mcL    Baso # 0.03 0.01 - 0.08 x10(3)/mcL    IG# 0.11 (H) 0.0001 - 0.031 x10(3)/mcL    IG% 0.6 (H) 0 - 0.5 %    NRBC% 0.0 0 - 1 %   Blood Smear Microscopic Exam    Collection Time: 04/14/23  4:23 AM   Result Value Ref Range    Hypochrom Slight (A) (none)    Platelet Est Normal Normal, Adequate   RT Blood Gas    Collection Time: 04/14/23  4:33 AM   Result Value Ref Range    Sample Type Arterial Blood     pH, Blood gas 7.483 (H) 7.350 - 7.450    pCO2, Blood gas 28.9 (L) 35.0 - 45.0 mmHg    pO2, Blood gas 171.0 (H) 80.0 - 105.0 mmHg    sO2, Blood gas 100.0 95.0 - 100.0 %    HCO3, Blood gas 23.4 22.0 - 26.0  mmol/L    Base Excess, Blood gas 1.20 -2.00 - 2.00 mmol/L    FIO2, Blood gas 30 %    Vt 400 ml    RR 16 b/min    PEEP 5.0 cmH2O    MODE AC     Notified Ashly RN     Notified By Printess RT    POCT glucose    Collection Time: 04/14/23  6:07 AM   Result Value Ref Range    POCT Glucose 137 (H) 70 - 110 mg/dL       Significant Imaging:  Imaging Results              X-Ray Chest AP Portable (Final result)  Result time 04/13/23 06:52:35      Final result by Mary Carmen Shore III, MD (04/13/23 06:52:35)                   Impression:      1. There is continued progression of the patient's right apical pneumothorax and right-sided subcutaneous emphysema as described above.  See above comments for details.      Electronically signed by: Mary Carmen Shore  Date:    04/13/2023  Time:    06:52               Narrative:    EXAMINATION:  STUDY: XR CHEST AP PORTABLE    CLINICAL HISTORY AND TECHNIQUE:  Bharath Miranda RT on 4/13/2023  6:21 AM    CURRENT CLINICAL HISTORY: ER PT    FOLLOW UP  CHEST TUBE PLACEMENT    PTA  CARDIAC ARREST     RT SIDED PNEUMOTHORAX    RT SIDED CHEST TUBE PLACEMENT   PT ON VENT   LOOKED AT BY DR PINEDO    PMH: ASTHMA ALZHEIMER'S DISEASE  KIDNEY STONE    TECHNIQUE:1V  PORTABLE CHEST    TECHNOLOGIST: RW    COMPARISON:  Chest x-ray obtained approximately 1-1/2 hours earlier    FINDINGS:  The position of the patient's small gauge right-sided chest tube and endotracheal 2 has not changed appreciably.  Since the prior examination there is continued expansion of the right apical pneumothorax (estimated to be approximately 10% volume on the current exam) and continued progression of the subcutaneous emphysema within the right chest wall.  The cardiac, hilar, and mediastinal contours appear unremarkable.I see no lobar or segmental infiltrates.No significant pleural effusions are noted.There is moderate demineralization of the skeletal structures with mild degenerative changes noted involving the thoracic spine.                                        X-Ray Chest AP Portable (Final result)  Result time 04/13/23 04:46:54      Final result by Mary Carmen Shore III, MD (04/13/23 04:46:54)                   Impression:      1. A small gauge, right-sided chest tube is present with the distal tip located within the region of the right hilum.  2. The previously noted right apical pneumothorax has expanded slightly since prior examination (estimated volume of approximately 5%) and the previously noted right-sided subcutaneous emphysema has progressed considerably.      Electronically signed by: Mary Carmen Shore  Date:    04/13/2023  Time:    04:46               Narrative:    EXAMINATION:  STUDY: XR CHEST AP PORTABLE    CLINICAL HISTORY AND TECHNIQUE:  Bharath Miranda RT on 4/13/2023  4:43 AM    CURRENT CLINICAL HISTORY: ER PT    PTA  CARDIAC ARREST     RT SIDED PNEUMOTHORAX    RT SIDED CHEST TUBE PLACEMENT   PT ON VENT   LOOKED AT BY DR PINEDO    PMH: ASTHMA ALZHEIMER'S DISEASE  KIDNEY STONE    TECHNIQUE:1V  PORTABLE CHEST    TECHNOLOGIST: ANNAMARIE    COMPARISON:  None    FINDINGS:  The distal tip of the patient's endotracheal tube is approximately 4 cm above the fernanda.  A small gauge, right-sided chest tube is present with the distal tip located within the region of the right hilum.  The previously noted right apical pneumothorax appears to have expanded slightly with an estimated volume approximately 5 % on the current exam.  The previously noted right axillary subcutaneous emphysema has also progressed considerably extending more inferiorly along the right lateral chest wall and into the right supraclavicular region.The cardiac, hilar, and mediastinal contours appear unremarkable.I see no lobar or segmental infiltrates.No significant pleural effusions are noted.There is moderate demineralization of the skeletal structures with mild degenerative changes noted involving thoracic spine.                                       CTA Chest Non-Coronary (PE Studies) (Final  result)  Result time 04/13/23 06:51:43      Final result by Mariano Raygoza MD (04/13/23 06:51:43)                   Impression:      1.  Extensive bilateral pulmonary embolization with resultant right heart strain as described above    2.  Right anterior lateral rib fractures with underlying pneumothorax and probable pulmonary contusion  3.  Thoracolumbar spondylosis    NOTE: Agree with preliminary report.  Primary physician was notified at the time of the reading.    All CT scans at [this location] are performed using dose modulation techniques as appropriate to a performed exam including the following:  Automated exposure control; adjustment of the mA and/or kV according to patient size (this includes techniques or standardized protocols for targeted exams where dose is matched to indication / reason for exam; i.e. extremities or head); use of iterative reconstruction technique.    Finalized on: 4/13/2023 6:51 AM By:  Mariano Raygoza MD  [facility]# [id number]      2023-04-13 06:53:45.644    [facility]               Narrative:    EXAM: CTA CHEST NON CORONARY (PE STUDIES)    CLINICAL INDICATION: Suspect pulmonary embolism    TECHNIQUE: Axial and multiplanar 2-D reformations provided.  With IV contrast and a CTA technique    PRIORS: Chest radiograph    FINDINGS:  1.  Bilateral pulmonary emboli are present in both upper lobes and involving the right lower lobe with evidence of right heart strain    2.  Right anterior lateral rib fractures from the third to the seventh ribs with extensive subcutaneous emphysematous changes  5.  Thoracolumbar spondylosis without evidence of acute vertebral body fracture  3.  Right pneumothorax  4.  Patchy airspace disease in the right lung diffusely                                         CT Head Without Contrast (Final result)  Result time 04/13/23 06:47:10      Final result by Mariano Raygoza MD (04/13/23 06:47:10)                   Impression:      1.  No evidence for acute intracranial event    2.   Senescent changes as described (agree with preliminary)    All CT scans at [this location] are performed using dose modulation techniques as appropriate to a performed exam including the following:  Automated exposure control; adjustment of the mA and/or kV according to patient size (this includes techniques or standardized protocols for targeted exams where dose is matched to indication / reason for exam; i.e. extremities or head); use of iterative reconstruction technique.    Finalized on: 4/13/2023 6:47 AM By:  Mariano Raygoza MD  [facility]# [id number]      2023-04-13 06:49:15.440    [facility]               Narrative:    EXAM: CT HEAD WITHOUT CONTRAST    CLINICAL INDICATION: Cardiac arrest    TECHNIQUE: Axial and multiplanar 2-D reformations provided.  Without IV contrast    PRIORS: None    FINDINGS: There is no evidence for acute intracranial hemorrhage, mass nor midline shift.  CSF-containing spaces are increased in size consistent with symmetrical global atrophy.  Deep white matter periventricular changes are mild.  Paranasal sinuses, orbits and mastoids are all clear.                                         X-Ray Chest AP Portable (Final result)  Result time 04/13/23 04:32:53      Final result by Mary Carmen Shore III, MD (04/13/23 04:32:53)                   Impression:      1. The distal tip of the patient's endotracheal tube is approximately 3-4 cm above the fernanda.  2. Moderate subcutaneous emphysema is noted within the region of the right axilla and a small (less than 5% volume) right apical pneumothorax is present.  See above comments.The emergency room physician was notified of this finding at the time of this interpretation (approximately 04:30 on 04/13/2023 via telephone).    COMMUNICATION  This critical result was discovered/received at 04:30 on 04/13/2023.  The critical information above was relayed directly by me by telephone to Dr. Quintero on 04/13/2023 at 04:30.      Electronically signed by: Mary Carmen  Mone  Date:    04/13/2023  Time:    04:32               Narrative:    EXAMINATION:  STUDY: XR CHEST AP PORTABLE    CLINICAL HISTORY AND TECHNIQUE:  Bharath Miranda, RT on 4/13/2023 12:06 AM    CURRENT CLINICAL HISTORY: ER PT    PTA  CARDIAC ARREST     ET TUBE PLACEMENT    PMH: ASTHMA ALZHEIMER'S DISEASE  KIDNEY STONE    TECHNIQUE:1V  PORTABLE CHEST    TECHNOLOGIST: ANNAMARIE    COMPARISON:  03/03/2023    FINDINGS:  The image is overpenetrated and less than optimal.  The distal tip of the patient's endotracheal tube is 3-4 cm above the fernanda.  A small (less than 5% volume) right apical pneumothorax is present.  A moderate amount of subcutaneous emphysema is noted within the region of the right axilla.  Skin folds overlie the mid and lower right lung.  The cardiac, hilar, and mediastinal contours appear unremarkable.I see no lobar or segmental infiltrates.No significant pleural effusions are noted.There is moderate demineralization of the skeletal structures with mild degenerative changes noted involving the thoracic spine.                                  Results for orders placed during the hospital encounter of 04/12/23    Echo    Interpretation Summary  · The left ventricle is normal in size with normal systolic function. LVEF 60-65%.  · Grade I left ventricular diastolic dysfunction.  · Normal right ventricular size with normal right ventricular systolic function. No evidence of Velazquez's sign.  · There is mild aortic valve stenosis.  · Mild to moderate tricuspid regurgitation.  · Mechanically ventilated; cannot use inferior caval vein diameter to estimate central venous pressure.    Hx of cardiac arrest  b/l PE  chest tube     EKG:        Telemetry:  Sinus Tachycardia 102    Physical Exam  Vitals and nursing note reviewed.   Constitutional:       Comments: Intubated and sedated   Cardiovascular:      Rate and Rhythm: Regular rhythm.      Comments: Soft ejection systolic murmur in the right upper sternal border  Chest  wall subcutaneous emphysema  Pulmonary:      Comments: Ventilator associated rhonchi  Skin:     General: Skin is warm.   Neurological:      Comments: sedated       Home Medications:   No current facility-administered medications on file prior to encounter.     Current Outpatient Medications on File Prior to Encounter   Medication Sig Dispense Refill    donepeziL (ARICEPT) 10 MG tablet Take 10 mg by mouth every evening.      montelukast (SINGULAIR) 10 mg tablet Take 10 mg by mouth once daily.      multivitamin capsule Take 1 capsule by mouth once daily.      pantoprazole (PROTONIX) 40 MG tablet Take 40 mg by mouth once daily.      SYMBICORT 160-4.5 mcg/actuation HFAA 2 puffs 2 (two) times daily.      vitamin E 100 UNIT capsule Take 100 Units by mouth once daily.         Current Inpatient Medications:    Current Facility-Administered Medications:     0.9%  NaCl infusion, , Intravenous, Continuous, Judy Aguirre MD, Last Rate: 125 mL/hr at 04/14/23 0433, New Bag at 04/14/23 0433    dextrose 10% bolus 125 mL 125 mL, 12.5 g, Intravenous, PRN, Judy Aguirre MD    dextrose 10% bolus 250 mL 250 mL, 25 g, Intravenous, PRN, Judy Aguirre MD    enoxaparin injection 50 mg, 50 mg, Subcutaneous, Q12H, Judy Aguirre MD, 50 mg at 04/13/23 2046    famotidine (PF) injection 20 mg, 20 mg, Intravenous, Daily, Judy Aguirre MD, 20 mg at 04/13/23 0841    glucagon (human recombinant) injection 1 mg, 1 mg, Intramuscular, PRN, Judy Aguirre MD    glucagon (human recombinant) injection 1 mg, 1 mg, Intramuscular, PRN, Judy Aguirre MD    insulin aspart U-100 pen 1-10 Units, 1-10 Units, Subcutaneous, Q6H PRN, Judy Aguirre MD, 6 Units at 04/13/23 1741    insulin aspart U-100 pen 1-10 Units, 1-10 Units, Subcutaneous, Q6H PRN, Judy Aguirre MD    mupirocin 2 % ointment, , Nasal, BID, Judy Aguirre MD, Given at 04/13/23 2047    NORepinephrine 8 mg in dextrose 5% 250 mL infusion, 0-3 mcg/kg/min, Intravenous,  Continuous, Judy Aguirre MD, Last Rate: 0 mL/hr at 04/13/23 2310, 0 mcg/kg/min at 04/13/23 2310    ondansetron injection 4 mg, 4 mg, Intravenous, Q8H PRN, Judy Aguirre MD    pantoprazole injection 40 mg, 40 mg, Intravenous, Daily, Judy Aguirre MD, 40 mg at 04/13/23 0903    piperacillin-tazobactam (ZOSYN) 4.5 g in dextrose 5 % in water (D5W) 5 % 100 mL IVPB (MB+), 4.5 g, Intravenous, Q8H, Judy Aguirre MD, Last Rate: 25 mL/hr at 04/14/23 0604, 4.5 g at 04/14/23 0604    propofol (DIPRIVAN) 10 mg/mL infusion, 0-60 mcg/kg/min, Intravenous, Continuous, Judy Aguirre MD, Last Rate: 9.1 mL/hr at 04/14/23 0700, 30 mcg/kg/min at 04/14/23 0700    sodium chloride 0.9% flush 10 mL, 10 mL, Intravenous, PRN, Judy Aguirre MD         VTE Risk Mitigation (From admission, onward)           Ordered     enoxaparin injection 50 mg  Every 12 hours (non-standard times)         04/13/23 0356                    Assessment:   Acute hypoxemic respiratory failure status post mechanical ventilation    Sepsis/UTI    Bilateral pulmonary embolism - likely provoked by underlying infection and decreased mobility    Preserved EF and normal RV and LV function    On therapeutic Lovenox    No need for any form of thrombectomy at this point in time.  No need for any systemic tPA at this point in time.    s/p cardiac arrest, remains intubated and sedated (approximately 15 minutes of no CPR)    On Levo    Await neurological status    Extensive discussion by Dr Burkett on 4/14  about neurological outcome, given 15 minute down time without CPR.    Pneumothorax    Could consider changing Lovenox to Heparin    Anemia    Full code    NSTEMI  -  troponin 0.3 to 2.0  -  likely secondary to PE and CPR        Plan:   Anemia management per primary team    Consider changing Lovenox to Heparin    Antibiotics per primary team    Chest tube management per surgery    Await neurological status          Haylee Nails, TREVOR  Cardiology  [facility]  04/14/2023 12:31 PM

## 2023-04-15 LAB
ALBUMIN SERPL-MCNC: 2.3 G/DL (ref 3.4–5)
ALBUMIN/GLOB SERPL: 0.8 RATIO
ALP SERPL-CCNC: 86 UNIT/L (ref 50–144)
ALT SERPL-CCNC: 55 UNIT/L (ref 1–45)
ANION GAP SERPL CALC-SCNC: 1 MEQ/L (ref 2–13)
ANISOCYTOSIS BLD QL SMEAR: SLIGHT
APTT PPP: 34.8 SECONDS (ref 23–29.4)
AST SERPL-CCNC: 133 UNIT/L (ref 14–36)
BACTERIA SPEC CULT: NO GROWTH
BASE EXCESS BLD CALC-SCNC: -3.4 MMOL/L (ref -2–2)
BASOPHILS # BLD AUTO: 0.03 X10(3)/MCL (ref 0.01–0.08)
BASOPHILS NFR BLD AUTO: 0.2 % (ref 0.1–1.2)
BILIRUBIN DIRECT+TOT PNL SERPL-MCNC: 0.6 MG/DL (ref 0–1)
BLOOD GAS SAMPLE TYPE (OHS): ABNORMAL
BUN SERPL-MCNC: 13 MG/DL (ref 7–20)
CALCIUM SERPL-MCNC: 7.8 MG/DL (ref 8.4–10.2)
CHLORIDE SERPL-SCNC: 116 MMOL/L (ref 98–110)
CO2 SERPL-SCNC: 22 MMOL/L (ref 21–32)
CREAT SERPL-MCNC: 0.58 MG/DL (ref 0.66–1.25)
CREAT/UREA NIT SERPL: 22 (ref 12–20)
EOSINOPHIL # BLD AUTO: 0.02 X10(3)/MCL (ref 0.04–0.36)
EOSINOPHIL NFR BLD AUTO: 0.1 % (ref 0.7–7)
ERYTHROCYTE [DISTWIDTH] IN BLOOD BY AUTOMATED COUNT: 14.5 % (ref 11–14.5)
FIO2 BLOOD GAS (OHS): 28 %
GFR SERPLBLD CREATININE-BSD FMLA CKD-EPI: 89 MLS/MIN/1.73/M2
GLOBULIN SER-MCNC: 3 GM/DL (ref 2–3.9)
GLUCOSE SERPL-MCNC: 146 MG/DL (ref 70–115)
GRAM STN SPEC: NORMAL
GRAM STN SPEC: NORMAL
HCO3 BLDA-SCNC: 21.6 MMOL/L (ref 22–26)
HCT VFR BLD AUTO: 23.6 % (ref 36–48)
HGB BLD-MCNC: 7.6 G/DL (ref 11.8–16)
IMM GRANULOCYTES # BLD AUTO: 0.15 X10(3)/MCL (ref 0–0.03)
IMM GRANULOCYTES NFR BLD AUTO: 0.8 % (ref 0–0.5)
INR BLD: 0.98
LYMPHOCYTES # BLD AUTO: 1.49 X10(3)/MCL (ref 1.16–3.74)
LYMPHOCYTES NFR BLD AUTO: 8.1 % (ref 20–55)
MAGNESIUM SERPL-MCNC: 2.1 MG/DL (ref 1.8–2.4)
MCH RBC QN AUTO: 30.3 PG (ref 27–34)
MCV RBC AUTO: 94 FL (ref 79–99)
MEAN CELL HEMOGLOBIN CONCENTRATION (OHS) G/DL: 32.2 G/DL (ref 31–37)
MECH RR (OHS): 20 B/MIN
MECH VT (OHS): 325 ML
MODE (OHS): AC
MONOCYTES # BLD AUTO: 0.85 X10(3)/MCL (ref 0.24–0.36)
MONOCYTES NFR BLD AUTO: 4.6 % (ref 4.7–12.5)
NEUTROPHILS # BLD AUTO: 15.85 X10(3)/MCL (ref 1.56–6.13)
NEUTROPHILS NFR BLD AUTO: 86.2 % (ref 37–73)
NRBC BLD AUTO-RTO: 0.4 % (ref 0–1)
PCO2 BLDA: 33.2 MMHG (ref 35–45)
PEEP (OHS): 5 CMH2O
PH BLDA: 7.41 [PH] (ref 7.35–7.45)
PHOSPHATE SERPL-MCNC: 2.4 MG/DL (ref 2.5–4.9)
PLATELET # BLD AUTO: 279 X10(3)/MCL (ref 140–371)
PLATELET # BLD EST: NORMAL 10*3/UL
PMV BLD AUTO: 10.4 FL (ref 9.4–12.4)
PO2 BLDA: 119 MMHG (ref 80–105)
POCT GLUCOSE: 161 MG/DL (ref 70–110)
POCT GLUCOSE: 166 MG/DL (ref 70–110)
POCT GLUCOSE: 195 MG/DL (ref 70–110)
POTASSIUM SERPL-SCNC: 4 MMOL/L (ref 3.5–5.1)
PROT SERPL-MCNC: 5.3 GM/DL (ref 6.3–8.2)
PROTHROMBIN TIME: 10 SECONDS (ref 9.3–11.9)
RBC # BLD AUTO: 2.51 X10(6)/MCL (ref 4–5.1)
SAO2 % BLDA: 99.7 % (ref 95–100)
SODIUM SERPL-SCNC: 139 MMOL/L (ref 135–145)
TROPONIN I SERPL-MCNC: 0.47 NG/ML (ref 0–0.03)
WBC # SPEC AUTO: 18.4 X10(3)/MCL (ref 4–11.5)

## 2023-04-15 PROCEDURE — C9113 INJ PANTOPRAZOLE SODIUM, VIA: HCPCS | Performed by: FAMILY MEDICINE

## 2023-04-15 PROCEDURE — 99900026 HC AIRWAY MAINTENANCE (STAT)

## 2023-04-15 PROCEDURE — 94003 VENT MGMT INPAT SUBQ DAY: CPT

## 2023-04-15 PROCEDURE — 85025 COMPLETE CBC W/AUTO DIFF WBC: CPT | Performed by: FAMILY MEDICINE

## 2023-04-15 PROCEDURE — 27000221 HC OXYGEN, UP TO 24 HOURS

## 2023-04-15 PROCEDURE — 25000003 PHARM REV CODE 250: Performed by: FAMILY MEDICINE

## 2023-04-15 PROCEDURE — 63600175 PHARM REV CODE 636 W HCPCS: Performed by: FAMILY MEDICINE

## 2023-04-15 PROCEDURE — 36600 WITHDRAWAL OF ARTERIAL BLOOD: CPT

## 2023-04-15 PROCEDURE — 84100 ASSAY OF PHOSPHORUS: CPT | Performed by: FAMILY MEDICINE

## 2023-04-15 PROCEDURE — 97530 THERAPEUTIC ACTIVITIES: CPT

## 2023-04-15 PROCEDURE — 36415 COLL VENOUS BLD VENIPUNCTURE: CPT | Performed by: FAMILY MEDICINE

## 2023-04-15 PROCEDURE — 85610 PROTHROMBIN TIME: CPT | Performed by: FAMILY MEDICINE

## 2023-04-15 PROCEDURE — 85730 THROMBOPLASTIN TIME PARTIAL: CPT | Performed by: FAMILY MEDICINE

## 2023-04-15 PROCEDURE — 20000000 HC ICU ROOM

## 2023-04-15 PROCEDURE — 37799 UNLISTED PX VASCULAR SURGERY: CPT

## 2023-04-15 PROCEDURE — 80053 COMPREHEN METABOLIC PANEL: CPT | Performed by: FAMILY MEDICINE

## 2023-04-15 PROCEDURE — 84484 ASSAY OF TROPONIN QUANT: CPT | Performed by: FAMILY MEDICINE

## 2023-04-15 PROCEDURE — 99900035 HC TECH TIME PER 15 MIN (STAT)

## 2023-04-15 PROCEDURE — 83735 ASSAY OF MAGNESIUM: CPT | Performed by: FAMILY MEDICINE

## 2023-04-15 PROCEDURE — 27200966 HC CLOSED SUCTION SYSTEM

## 2023-04-15 PROCEDURE — 94761 N-INVAS EAR/PLS OXIMETRY MLT: CPT

## 2023-04-15 RX ADMIN — ENOXAPARIN SODIUM 50 MG: 60 INJECTION SUBCUTANEOUS at 10:04

## 2023-04-15 RX ADMIN — PANTOPRAZOLE SODIUM 40 MG: 40 INJECTION, POWDER, FOR SOLUTION INTRAVENOUS at 10:04

## 2023-04-15 RX ADMIN — ENOXAPARIN SODIUM 50 MG: 60 INJECTION SUBCUTANEOUS at 09:04

## 2023-04-15 RX ADMIN — PIPERACILLIN AND TAZOBACTAM 4.5 G: 4; .5 INJECTION, POWDER, FOR SOLUTION INTRAVENOUS; PARENTERAL at 04:04

## 2023-04-15 RX ADMIN — MUPIROCIN: 20 OINTMENT TOPICAL at 09:04

## 2023-04-15 RX ADMIN — MUPIROCIN: 20 OINTMENT TOPICAL at 10:04

## 2023-04-15 RX ADMIN — INSULIN ASPART 1 UNITS: 100 INJECTION, SOLUTION INTRAVENOUS; SUBCUTANEOUS at 11:04

## 2023-04-15 RX ADMIN — PIPERACILLIN AND TAZOBACTAM 4.5 G: 4; .5 INJECTION, POWDER, FOR SOLUTION INTRAVENOUS; PARENTERAL at 11:04

## 2023-04-15 RX ADMIN — FAMOTIDINE 20 MG: 10 INJECTION INTRAVENOUS at 10:04

## 2023-04-15 RX ADMIN — PIPERACILLIN AND TAZOBACTAM 4.5 G: 4; .5 INJECTION, POWDER, FOR SOLUTION INTRAVENOUS; PARENTERAL at 06:04

## 2023-04-15 RX ADMIN — PROPOFOL 20 MCG/KG/MIN: 10 INJECTION, EMULSION INTRAVENOUS at 01:04

## 2023-04-15 NOTE — NURSING
"1901  V/S STABLE, NO VASOPRESSORS INFUSING AT THIS TIME, PT. UNRESPONSIVE EXCEPT SLIGHT OPENING OF EYES TO NOXIOUS STIMULI, PUPILS EQUAL BILAT AND SLUGGISH REACTION TO LIGHT, BREATHING OVER SET VENT RATE, COUGH REFLEX INTACT, DOES NOT FOLLOW ANY COMMANDS OR WITHDRAW FROM NOXIOUS STIMULI, PT. SON JUDITH AND GRANDDAUGHTER TESSA AT BEDSIDE, REVIEWED PLAN OF CARE AND POSSIBLE OUTCOMES W/ FAMILY, VERBALIZED UNDERSTANDING, CONTINUING TO MONITOR VERY CLOSELY    ALSO AT THIS TIME PT. FAMILY HAD CONCERNS ABOUT SOME VISITORS IN HOSPITAL, LIST OF NAMES RECEIVED BY STAFF AND PT. CODE WORD OF "1010" ACQUIRED    2101  PT. TACHYPNEIC AND HYPERTENSIVE AT THIS TIME, BITING ON ORAL ETT, DIPRIVAN GTT RESUMED, TITRATE PER PROTOCOL, CONTINUING TO MONITOR CLOSELY    2301  V/S STABLE, TOLERATING VENT BETTER W/ DIPRIVAN GTT IN PROGRESS, RESIDUAL HIGH, TUBE FEEDS HELD AT THIS TIME, REASSESS IN 4 HOURS, CONTINUES TO BE MINIMALLY RESPONSIVE EXCEPT TO NOXIOUS STIMULI, MONITORING CLOSELY    0045  NICOLÁS NOTIFIED OF PT. GCS<5, PATEL THEE PT. SCREENER, PT. RULED OUT FOR ALL DONATION DUE TO AGE    0101  V/S STABLE. NO CHANGES AT THIS TIME, CONTINUING TO MONITOR CLOSELY    0301  V/S STABLE, NO DISTRESS NOTED, TOLERATING VENT WELL W/ DIPRIVAN GTT AT LOW DOSE, MONITORING CLOSELY    0501  V/S STABLE, NO DISTRESS NOTED, MONITORING CLOSELY    0620  DIPRIVAN GTT ON HOLD FOR SEDATION VACATION                "

## 2023-04-15 NOTE — PLAN OF CARE
Problem: Adult Inpatient Plan of Care  Goal: Plan of Care Review  Outcome: Ongoing, Progressing  Goal: Patient-Specific Goal (Individualized)  Outcome: Ongoing, Progressing  Goal: Absence of Hospital-Acquired Illness or Injury  Outcome: Ongoing, Progressing  Goal: Optimal Comfort and Wellbeing  Outcome: Ongoing, Progressing  Goal: Readiness for Transition of Care  Outcome: Ongoing, Progressing     Problem: Infection  Goal: Absence of Infection Signs and Symptoms  Outcome: Ongoing, Progressing     Problem: Adjustment to Illness (Sepsis/Septic Shock)  Goal: Optimal Coping  Outcome: Ongoing, Progressing     Problem: Bleeding (Sepsis/Septic Shock)  Goal: Absence of Bleeding  Outcome: Ongoing, Progressing     Problem: Glycemic Control Impaired (Sepsis/Septic Shock)  Goal: Blood Glucose Level Within Desired Range  Outcome: Ongoing, Progressing     Problem: Infection Progression (Sepsis/Septic Shock)  Goal: Absence of Infection Signs and Symptoms  Outcome: Ongoing, Progressing     Problem: Nutrition Impaired (Sepsis/Septic Shock)  Goal: Optimal Nutrition Intake  Outcome: Ongoing, Progressing     Problem: Fall Injury Risk  Goal: Absence of Fall and Fall-Related Injury  Outcome: Ongoing, Progressing     Problem: Skin Injury Risk Increased  Goal: Skin Health and Integrity  Outcome: Ongoing, Progressing     Problem: Communication Impairment (Mechanical Ventilation, Invasive)  Goal: Effective Communication  Outcome: Ongoing, Progressing     Problem: Device-Related Complication Risk (Mechanical Ventilation, Invasive)  Goal: Optimal Device Function  Outcome: Ongoing, Progressing     Problem: Inability to Wean (Mechanical Ventilation, Invasive)  Goal: Mechanical Ventilation Liberation  Outcome: Ongoing, Progressing     Problem: Nutrition Impairment (Mechanical Ventilation, Invasive)  Goal: Optimal Nutrition Delivery  Outcome: Ongoing, Progressing     Problem: Skin and Tissue Injury (Mechanical Ventilation, Invasive)  Goal:  Absence of Device-Related Skin and Tissue Injury  Outcome: Ongoing, Progressing     Problem: Ventilator-Induced Lung Injury (Mechanical Ventilation, Invasive)  Goal: Absence of Ventilator-Induced Lung Injury  Outcome: Ongoing, Progressing

## 2023-04-15 NOTE — SUBJECTIVE & OBJECTIVE
Review of Systems   Unable to perform ROS: Intubated   Objective:     Vital Signs (Most Recent):  Temp: 98.5 °F (36.9 °C) (04/15/23 1106)  Pulse: 90 (04/15/23 1106)  Resp: (!) 25 (04/15/23 0920)  BP: (!) 144/54 (04/15/23 0920)  SpO2: 97 % (04/15/23 0920)   Vital Signs (24h Range):  Temp:  [98 °F (36.7 °C)-99.5 °F (37.5 °C)] 98.5 °F (36.9 °C)  Pulse:  [] 90  Resp:  [19-36] 25  SpO2:  [96 %-100 %] 97 %  BP: (117-144)/(49-81) 144/54  Arterial Line BP: (105-195)/(39-70) 186/67     Weight: 61 kg (134 lb 7.7 oz)  Body mass index is 22.38 kg/m².    Physical Exam  Vitals and nursing note reviewed. Exam conducted with a chaperone present.   Constitutional:       Comments: Intubated and sedated  Held diprivan, no responses to stimulation, unable to arouse, does not follow commands   HENT:      Head:      Comments: ET tube, OGT in place  Neck:      Vascular: No carotid bruit.   Cardiovascular:      Rate and Rhythm: Normal rate and regular rhythm.      Pulses: Normal pulses.      Heart sounds: Normal heart sounds.   Pulmonary:      Effort: Pulmonary effort is normal.      Breath sounds: Normal breath sounds. No wheezing or rales.      Comments: Diminished RUL otherwise clear  Abdominal:      General: Bowel sounds are normal.      Palpations: Abdomen is soft.   Musculoskeletal:      Cervical back: Neck supple.   Lymphadenopathy:      Cervical: No cervical adenopathy.   Skin:     General: Skin is warm and dry.      Findings: No erythema, lesion or rash.   Neurological:      Comments: Intubated and sedated  Held sedation wtihout improvement in responsiveness   Psychiatric:      Comments: Unable to assess           Significant Labs: All pertinent labs within the past 24 hours have been reviewed.  CBC:   Recent Labs   Lab 04/14/23  0423 04/15/23  0421   WBC 17.4* 18.4*   HGB 7.6* 7.6*   HCT 23.2* 23.6*    279       CMP:   Recent Labs   Lab 04/14/23  0423 04/15/23  0421    139   K 3.3* 4.0   CO2 21 22   BUN  17.0 13.0   CREATININE 0.60* 0.58*   CALCIUM 7.0* 7.8*   ALBUMIN 2.4* 2.3*   BILITOT 0.8 0.6   ALKPHOS 82 86   * 133*   ALT 69* 55*         Significant Imaging: I have reviewed all pertinent imaging results/findings within the past 24 hours.

## 2023-04-15 NOTE — ASSESSMENT & PLAN NOTE
Pt with witnessed arrest  Did not meet criteria for hypothermia protocol  Down for at least 15 mintues prior to starting CPR when first responders arrived

## 2023-04-15 NOTE — ASSESSMENT & PLAN NOTE
Magnesium reviewed-   Recent Labs   Lab 04/14/23  0423 04/15/23  0421   MG 1.60* 2.10      Will replace magnesium and continue to monitor.

## 2023-04-15 NOTE — ASSESSMENT & PLAN NOTE
Patient with noted electrolyte deficiencies with Hypo-Kalemia. Latest electrolytes have been reviewed and values are   Potassium Level   Date Value Ref Range Status   04/15/2023 4.0 3.5 - 5.1 mmol/L Final   . Will continue to monitor electrolytes closely.    improved

## 2023-04-15 NOTE — PROGRESS NOTES
[facility] Medicine  Progress Note    Patient Name: Lin Flowers  MRN: [id number]  Patient Class: IP- Inpatient   Admission Date: 4/12/2023  Length of Stay: 2 days  Attending Physician: Judy Aguirre MD  Primary Care Provider: Nilton Ndiaye MD        Subjective:     Principal Problem:Severe sepsis with septic shock        HPI:  86 yo female with hx of Dyslipidemia, Dementia, DM, who has not been feeling well reportedly in bed x 1 week.  She lives alone at home, her family was at her home on the evening of 04/12/2023 and pt had witnessed arrest.  Family called 911, did not immediately start CPR, First responders started CPR and pt reportedly intuabated on the scene.   On EMS arrival Pt in cardiorespiratory arrest, intubated continued on CPR another 15 mins, started on levophed in ED, Chest CTA shows bilateral Pes,  Rt sided multiple rib fractures and PTX. Pt unresponsive not on any sedatives, intubated family not at bedside. Reported Full code for now. Details of event from ED and pt family via telephone.  Pt intubated and sedated unable to answer any questions.  Pt CXR shows multiple rib fractures consistent with effective CPR and small pneumothorax.  Chest tube placed by Dr. Roca in ER and CXR pneumothorax at 10%.          Overview/Hospital Course:  04/14/2023 pt sedated on diprivan at 30mg/kg still on AC rate 20 breathing above vent up to 30.  Stopped diprivan and no meaningful responses or able to follow any commands.  Vitals have been stable overnight.  Chest tube dislodged with transfer from ER to ICU and Surgery replaced tube.  CXR this am show 15% PTX with significant subcutaneous emphysema consistent with effective CPR and recent rib fractures from CPR.  Pt does not arouse despite holding sedation.    04/15/2023 pt off sedation for an extended time yesterday had to start due to neurologic twitches overnight, stopped again at 0600 this am, poor neurologic responses, she  breathes over vent, however does not arouse, no purposeful movement, pupils are sluggish.  Pt was down for at least 15 minutes prior to CPR being started.            Review of Systems   Unable to perform ROS: Intubated   Objective:     Vital Signs (Most Recent):  Temp: 98.5 °F (36.9 °C) (04/15/23 1106)  Pulse: 90 (04/15/23 1106)  Resp: (!) 25 (04/15/23 0920)  BP: (!) 144/54 (04/15/23 0920)  SpO2: 97 % (04/15/23 0920)   Vital Signs (24h Range):  Temp:  [98 °F (36.7 °C)-99.5 °F (37.5 °C)] 98.5 °F (36.9 °C)  Pulse:  [] 90  Resp:  [19-36] 25  SpO2:  [96 %-100 %] 97 %  BP: (117-144)/(49-81) 144/54  Arterial Line BP: (105-195)/(39-70) 186/67     Weight: 61 kg (134 lb 7.7 oz)  Body mass index is 22.38 kg/m².    Physical Exam  Vitals and nursing note reviewed. Exam conducted with a chaperone present.   Constitutional:       Comments: Intubated and sedated  Held diprivan, no responses to stimulation, unable to arouse, does not follow commands   HENT:      Head:      Comments: ET tube, OGT in place  Neck:      Vascular: No carotid bruit.   Cardiovascular:      Rate and Rhythm: Normal rate and regular rhythm.      Pulses: Normal pulses.      Heart sounds: Normal heart sounds.   Pulmonary:      Effort: Pulmonary effort is normal.      Breath sounds: Normal breath sounds. No wheezing or rales.      Comments: Diminished RUL otherwise clear  Abdominal:      General: Bowel sounds are normal.      Palpations: Abdomen is soft.   Musculoskeletal:      Cervical back: Neck supple.   Lymphadenopathy:      Cervical: No cervical adenopathy.   Skin:     General: Skin is warm and dry.      Findings: No erythema, lesion or rash.   Neurological:      Comments: Intubated and sedated  Held sedation wtihout improvement in responsiveness   Psychiatric:      Comments: Unable to assess           Significant Labs: All pertinent labs within the past 24 hours have been reviewed.  CBC:   Recent Labs   Lab 04/14/23  0423 04/15/23  0421   WBC 17.4*  18.4*   HGB 7.6* 7.6*   HCT 23.2* 23.6*    279       CMP:   Recent Labs   Lab 04/14/23  0423 04/15/23  0421    139   K 3.3* 4.0   CO2 21 22   BUN 17.0 13.0   CREATININE 0.60* 0.58*   CALCIUM 7.0* 7.8*   ALBUMIN 2.4* 2.3*   BILITOT 0.8 0.6   ALKPHOS 82 86   * 133*   ALT 69* 55*         Significant Imaging: I have reviewed all pertinent imaging results/findings within the past 24 hours.      Assessment/Plan:      * Severe sepsis with septic shock  This patient does have evidence of infective focus  My overall impression is septic shock due to lactate > 4, MAP < 65 and SBP < 90.  Source: Urinary Tract  Antibiotics given-   Antibiotics (72h ago, onward)    Start     Stop Route Frequency Ordered    04/13/23 0930  mupirocin 2 % ointment         04/18 0859 Nasl 2 times daily 04/13/23 0820    04/13/23 0700  piperacillin-tazobactam (ZOSYN) 4.5 g in dextrose 5 % in water (D5W) 5 % 100 mL IVPB (MB+)         -- IV Every 8 hours (non-standard times) 04/13/23 0620        Latest lactate reviewed-  No results for input(s): LACTATE in the last 72 hours.  Organ dysfunction indicated by Acute kidney injury, Acute respiratory failure and Encephalopathy    Fluid challenge Actual Body weight- Patient will receive 30ml/kg actual body weight to calculate fluid bolus for treatment of septic shock.     Post- resuscitation assessment Yes Perfusion exam was performed within 6 hours of septic shock presentation after bolus shows Adequate tissue perfusion assessed by non-invasive monitoring        blood respiratory and urine cx pending, no growth so far  Iv abx zosyn continues  Levophed weaned off     Acute hypoxemic respiratory failure  Patient with Hypoxic Respiratory failure which is Acute.  she is not on home oxygen. Supplemental oxygen was provided and noted- Vent Mode: A/C  Oxygen Concentration (%):  [28] 28  Resp Rate Total:  [24 br/min-37 br/min] 26 br/min  Vt Set:  [325 mL] 325 mL  PEEP/CPAP:  [5 cmH20] 5 cmH20  Mean  Airway Pressure:  [4.5 abW16-94 cmH20] 4.9 cmH20    .   Signs/symptoms of respiratory failure include- tachypnea, increased work of breathing and respiratory distress. Contributing diagnoses includes - Pulmonary Embolus Labs and images were reviewed. Patient Has recent ABG, which has been reviewed. Will treat underlying causes and adjust management of respiratory failure as follows-  contiue vent and will wean as tolerated    Pt breathing over vent, however neurologic status is poor    Bilateral pulmonary embolism  Continue lovenox         Cardiac arrest    Pt with witnessed arrest  Did not meet criteria for hypothermia protocol  Down for at least 15 mintues prior to starting CPR when first responders arrived    COmplicated UTI  Continue iv zosyn  Blood cx ordered due to severe sepsis  F/u on cx      Hypomagnesemia  Magnesium reviewed-   Recent Labs   Lab 04/14/23  0423 04/15/23  0421   MG 1.60* 2.10      Will replace magnesium and continue to monitor.           Hypokalemia  Patient with noted electrolyte deficiencies with Hypo-Kalemia. Latest electrolytes have been reviewed and values are   Potassium Level   Date Value Ref Range Status   04/15/2023 4.0 3.5 - 5.1 mmol/L Final   . Will continue to monitor electrolytes closely.    improved        VTE Risk Mitigation (From admission, onward)         Ordered     enoxaparin injection 50 mg  Every 12 hours (non-standard times)         04/13/23 0356                Discharge Planning   LANIE: 4/21/2023     Code Status: Full Code   Is the patient medically ready for discharge?:     Reason for patient still in hospital (select all that apply): Patient trending condition and Treatment  Discharge Plan A: Long-term acute care facility (LTAC)        Critical care time spent on the evaluation and treatment of severe organ dysfunction, review of pertinent labs and imaging studies, discussions with consulting providers and discussions with patient/family 45 minutes    Spoke with pt  son at length poor neurologic outcome and poor prognosis discussed at length.  Offered to look into transfer for neurology serivices and son will discuss with other family members.          Judy Aguirre MD  Department of Hospital Medicine   [facility]

## 2023-04-15 NOTE — ASSESSMENT & PLAN NOTE
Patient with Hypoxic Respiratory failure which is Acute.  she is not on home oxygen. Supplemental oxygen was provided and noted- Vent Mode: A/C  Oxygen Concentration (%):  [28] 28  Resp Rate Total:  [24 br/min-37 br/min] 26 br/min  Vt Set:  [325 mL] 325 mL  PEEP/CPAP:  [5 cmH20] 5 cmH20  Mean Airway Pressure:  [4.5 cbF38-61 cmH20] 4.9 cmH20    .   Signs/symptoms of respiratory failure include- tachypnea, increased work of breathing and respiratory distress. Contributing diagnoses includes - Pulmonary Embolus Labs and images were reviewed. Patient Has recent ABG, which has been reviewed. Will treat underlying causes and adjust management of respiratory failure as follows-  contiue vent and will wean as tolerated    Pt breathing over vent, however neurologic status is poor

## 2023-04-15 NOTE — PT/OT/SLP PROGRESS
Physical Therapy Treatment    Patient Name:  Lin Flowers   MRN:  [id number]    Recommendations:     Discharge Recommendations: home, rehabilitation facility, nursing facility, skilled  Discharge Equipment Recommendations: none  Barriers to discharge: None    Assessment:     Lin Flowers is a 87 y.o. female admitted with a medical diagnosis of Severe sepsis with septic shock.  She presents with the following impairments/functional limitations: weakness, impaired functional mobility, decreased upper extremity function, decreased lower extremity function Patient remains on vent at this time.    Rehab Prognosis: Good and Fair; patient would benefit from acute skilled PT services to address these deficits and reach maximum level of function.    Recent Surgery: * No surgery found *      Plan:     During this hospitalization, patient to be seen daily to address the identified rehab impairments via gait training, therapeutic activities, therapeutic exercises and progress toward the following goals:    Plan of Care Expires:  05/13/23    Subjective     Chief Complaint: weakness  Patient/Family Comments/goals: to get stronger  Pain/Comfort:  Pain Rating 1: 0/10      Objective:     Communicated with nursing prior to session.  Patient found supine with peripheral IV, arterial line, garcia catheter, NG tube, chest tube, ventilator, pulse ox (continuous), blood pressure cuff, telemetry upon PT entry to room.     General Precautions: Standard, fall  Orthopedic Precautions: N/A  Braces: N/A  Respiratory Status: Ventilator     Functional Mobility:        AM-PAC 6 CLICK MOBILITY  Turning over in bed (including adjusting bedclothes, sheets and blankets)?: 1  Sitting down on and standing up from a chair with arms (e.g., wheelchair, bedside commode, etc.): 1  Moving from lying on back to sitting on the side of the bed?: 1  Moving to and from a bed to a chair (including a wheelchair)?: 1  Need to walk in hospital room?: 1  Climbing  3-5 steps with a railing?: 1  Basic Mobility Total Score: 6       Treatment & Education:  Therapist provided PROM to all extremities in multiple directions to increase ROM and decreased risk of contractures.     Patient left supine with all lines intact and call button in reach..    GOALS:   Multidisciplinary Problems       Physical Therapy Goals          Problem: Physical Therapy    Goal Priority Disciplines Outcome Goal Variances Interventions   Physical Therapy Goal     PT, PT/OT Ongoing, Progressing     Description: Goals to be met by: discharge     Patient will increase functional independence with mobility by performin. Supine to sit with Stand-by Assistance  2. Sit to stand transfer with Stand-by Assistance                         Time Tracking:     PT Received On: 04/15/23  PT Start Time: 805     PT Stop Time: 813  PT Total Time (min): 8 min     Billable Minutes: Therapeutic Activity 8    Treatment Type: Treatment  PT/PTA: PT     Number of PTA visits since last PT visit: 0     04/15/2023

## 2023-04-16 PROBLEM — G93.1 ANOXIC BRAIN INJURY: Status: ACTIVE | Noted: 2023-04-16

## 2023-04-16 LAB
ALBUMIN SERPL-MCNC: 2.2 G/DL (ref 3.4–5)
ALBUMIN/GLOB SERPL: 0.7 RATIO
ALP SERPL-CCNC: 93 UNIT/L (ref 50–144)
ALT SERPL-CCNC: 41 UNIT/L (ref 1–45)
ANION GAP SERPL CALC-SCNC: 1 MEQ/L (ref 2–13)
ANISOCYTOSIS BLD QL SMEAR: SLIGHT
APTT PPP: 33.2 SECONDS (ref 23–29.4)
AST SERPL-CCNC: 100 UNIT/L (ref 14–36)
BACTERIA UR CULT: ABNORMAL
BASOPHILS # BLD AUTO: 0.02 X10(3)/MCL (ref 0.01–0.08)
BASOPHILS NFR BLD AUTO: 0.1 % (ref 0.1–1.2)
BILIRUBIN DIRECT+TOT PNL SERPL-MCNC: 0.7 MG/DL (ref 0–1)
BUN SERPL-MCNC: 11 MG/DL (ref 7–20)
CALCIUM SERPL-MCNC: 7.7 MG/DL (ref 8.4–10.2)
CHLORIDE SERPL-SCNC: 114 MMOL/L (ref 98–110)
CO2 SERPL-SCNC: 24 MMOL/L (ref 21–32)
CREAT SERPL-MCNC: 0.46 MG/DL (ref 0.66–1.25)
CREAT/UREA NIT SERPL: 24 (ref 12–20)
EOSINOPHIL # BLD AUTO: 0.04 X10(3)/MCL (ref 0.04–0.36)
EOSINOPHIL NFR BLD AUTO: 0.3 % (ref 0.7–7)
ERYTHROCYTE [DISTWIDTH] IN BLOOD BY AUTOMATED COUNT: 14.6 % (ref 11–14.5)
GFR SERPLBLD CREATININE-BSD FMLA CKD-EPI: >90 MLS/MIN/1.73/M2
GLOBULIN SER-MCNC: 3 GM/DL (ref 2–3.9)
GLUCOSE SERPL-MCNC: 171 MG/DL (ref 70–115)
HCT VFR BLD AUTO: 22.7 % (ref 36–48)
HGB BLD-MCNC: 7.4 G/DL (ref 11.8–16)
IMM GRANULOCYTES # BLD AUTO: 0.15 X10(3)/MCL (ref 0–0.03)
IMM GRANULOCYTES NFR BLD AUTO: 0.9 % (ref 0–0.5)
INR BLD: 1
LYMPHOCYTES # BLD AUTO: 1.37 X10(3)/MCL (ref 1.16–3.74)
LYMPHOCYTES NFR BLD AUTO: 8.7 % (ref 20–55)
MAGNESIUM SERPL-MCNC: 1.9 MG/DL (ref 1.8–2.4)
MCH RBC QN AUTO: 30.6 PG (ref 27–34)
MCV RBC AUTO: 93.8 FL (ref 79–99)
MEAN CELL HEMOGLOBIN CONCENTRATION (OHS) G/DL: 32.6 G/DL (ref 31–37)
MONOCYTES # BLD AUTO: 0.66 X10(3)/MCL (ref 0.24–0.36)
MONOCYTES NFR BLD AUTO: 4.2 % (ref 4.7–12.5)
NEUTROPHILS # BLD AUTO: 13.59 X10(3)/MCL (ref 1.56–6.13)
NEUTROPHILS NFR BLD AUTO: 85.8 % (ref 37–73)
NRBC BLD AUTO-RTO: 0.7 % (ref 0–1)
PHOSPHATE SERPL-MCNC: 2 MG/DL (ref 2.5–4.9)
PLATELET # BLD AUTO: 265 X10(3)/MCL (ref 140–371)
PLATELET # BLD EST: NORMAL 10*3/UL
PMV BLD AUTO: 10.3 FL (ref 9.4–12.4)
POCT GLUCOSE: 128 MG/DL (ref 70–110)
POTASSIUM SERPL-SCNC: 3.4 MMOL/L (ref 3.5–5.1)
PROT SERPL-MCNC: 5.2 GM/DL (ref 6.3–8.2)
PROTHROMBIN TIME: 10.2 SECONDS (ref 9.3–11.9)
RBC # BLD AUTO: 2.42 X10(6)/MCL (ref 4–5.1)
SODIUM SERPL-SCNC: 139 MMOL/L (ref 135–145)
WBC # SPEC AUTO: 15.8 X10(3)/MCL (ref 4–11.5)

## 2023-04-16 PROCEDURE — 84100 ASSAY OF PHOSPHORUS: CPT | Performed by: FAMILY MEDICINE

## 2023-04-16 PROCEDURE — 25000003 PHARM REV CODE 250: Performed by: FAMILY MEDICINE

## 2023-04-16 PROCEDURE — 85610 PROTHROMBIN TIME: CPT | Performed by: FAMILY MEDICINE

## 2023-04-16 PROCEDURE — 85730 THROMBOPLASTIN TIME PARTIAL: CPT | Performed by: FAMILY MEDICINE

## 2023-04-16 PROCEDURE — 27200966 HC CLOSED SUCTION SYSTEM

## 2023-04-16 PROCEDURE — 83735 ASSAY OF MAGNESIUM: CPT | Performed by: FAMILY MEDICINE

## 2023-04-16 PROCEDURE — C9113 INJ PANTOPRAZOLE SODIUM, VIA: HCPCS | Performed by: FAMILY MEDICINE

## 2023-04-16 PROCEDURE — 80053 COMPREHEN METABOLIC PANEL: CPT | Performed by: FAMILY MEDICINE

## 2023-04-16 PROCEDURE — 63600175 PHARM REV CODE 636 W HCPCS: Performed by: FAMILY MEDICINE

## 2023-04-16 PROCEDURE — 99900035 HC TECH TIME PER 15 MIN (STAT)

## 2023-04-16 PROCEDURE — 97530 THERAPEUTIC ACTIVITIES: CPT

## 2023-04-16 PROCEDURE — 85025 COMPLETE CBC W/AUTO DIFF WBC: CPT | Performed by: FAMILY MEDICINE

## 2023-04-16 PROCEDURE — 27000221 HC OXYGEN, UP TO 24 HOURS

## 2023-04-16 PROCEDURE — 11000001 HC ACUTE MED/SURG PRIVATE ROOM

## 2023-04-16 PROCEDURE — 94003 VENT MGMT INPAT SUBQ DAY: CPT

## 2023-04-16 PROCEDURE — 94761 N-INVAS EAR/PLS OXIMETRY MLT: CPT

## 2023-04-16 PROCEDURE — 99900026 HC AIRWAY MAINTENANCE (STAT)

## 2023-04-16 PROCEDURE — 36415 COLL VENOUS BLD VENIPUNCTURE: CPT | Performed by: FAMILY MEDICINE

## 2023-04-16 RX ORDER — SODIUM CHLORIDE 0.9 % (FLUSH) 0.9 %
10 SYRINGE (ML) INJECTION
Status: CANCELLED | OUTPATIENT
Start: 2023-04-16

## 2023-04-16 RX ORDER — LEVOFLOXACIN 5 MG/ML
750 INJECTION, SOLUTION INTRAVENOUS
Status: DISCONTINUED | OUTPATIENT
Start: 2023-04-16 | End: 2023-04-17 | Stop reason: HOSPADM

## 2023-04-16 RX ORDER — MORPHINE SULFATE 2 MG/ML
2 INJECTION, SOLUTION INTRAMUSCULAR; INTRAVENOUS
Status: DISCONTINUED | OUTPATIENT
Start: 2023-04-16 | End: 2023-04-17 | Stop reason: HOSPADM

## 2023-04-16 RX ORDER — MUPIROCIN 20 MG/G
OINTMENT TOPICAL 2 TIMES DAILY
Status: CANCELLED | OUTPATIENT
Start: 2023-04-16 | End: 2023-04-18

## 2023-04-16 RX ORDER — MORPHINE SULFATE 2 MG/ML
2 INJECTION, SOLUTION INTRAMUSCULAR; INTRAVENOUS
Status: CANCELLED | OUTPATIENT
Start: 2023-04-16

## 2023-04-16 RX ADMIN — MUPIROCIN: 20 OINTMENT TOPICAL at 08:04

## 2023-04-16 RX ADMIN — SODIUM CHLORIDE: 9 INJECTION, SOLUTION INTRAVENOUS at 06:04

## 2023-04-16 RX ADMIN — PIPERACILLIN AND TAZOBACTAM 4.5 G: 4; .5 INJECTION, POWDER, FOR SOLUTION INTRAVENOUS; PARENTERAL at 06:04

## 2023-04-16 RX ADMIN — LEVOFLOXACIN 750 MG: 5 INJECTION, SOLUTION INTRAVENOUS at 12:04

## 2023-04-16 RX ADMIN — PANTOPRAZOLE SODIUM 40 MG: 40 INJECTION, POWDER, FOR SOLUTION INTRAVENOUS at 08:04

## 2023-04-16 RX ADMIN — INSULIN ASPART 2 UNITS: 100 INJECTION, SOLUTION INTRAVENOUS; SUBCUTANEOUS at 06:04

## 2023-04-16 RX ADMIN — FAMOTIDINE 20 MG: 10 INJECTION INTRAVENOUS at 08:04

## 2023-04-16 RX ADMIN — MORPHINE SULFATE 2 MG: 2 INJECTION, SOLUTION INTRAMUSCULAR; INTRAVENOUS at 01:04

## 2023-04-16 RX ADMIN — ENOXAPARIN SODIUM 50 MG: 60 INJECTION SUBCUTANEOUS at 08:04

## 2023-04-16 NOTE — NURSING
1901  PT. REMAINS OFF DIPRIVAN SEDATION AND IS UNRESPONSIVE, COUGHING NOTED OCCASIONALLY W/ HYPERTENSION AND BITING ON OETT, NO COMMANDS, NO BABINSKI OR SCLERAL REFLEX PRESENT, COUGH REFLEX PRESENT, BREATHING OVER VENT IN CPAP MODE, O2 SATS STABLE, PT. REMAINS FULL CODE AT THIS TIME, CONTINUING TO MONITOR CLOSELY    1930  FATHER JULIA ACCOMPANIED BY ANOTHER  HERE, PRAYING OVER PT. AT THIS TIME, FAMILY MEMBER X2 HERE, PLAN OF CARE REVIEWED, ALLOWED TIME FOR QUESTIONS    2101  V/S REMAIN STABLE, NO CHANGES IN NEURO STATUS, MONITORING CLOSELY    2301  V/S STABLE, NO DISTRESS NOTED, MONITORING CLOSELY    0101  REMAINS UNRESPONSIVE, NO SEDATION IN PLACE, V/S STABLE, MONITORING CLOSELY    0301  V/S STABLE, NO DISTRESS NOTED, AGAIN REMAINS UNRESPONSIVE, TOLERATING VENT WELL, MONITORING CLOSELY

## 2023-04-16 NOTE — ASSESSMENT & PLAN NOTE
This patient does have evidence of infective focus  My overall impression is septic shock due to lactate > 4, MAP < 65 and SBP < 90.  Source: Urinary Tract  Antibiotics given-   Antibiotics (72h ago, onward)    Start     Stop Route Frequency Ordered    04/13/23 0930  mupirocin 2 % ointment         04/18 0859 Nasl 2 times daily 04/13/23 0820    04/13/23 0700  piperacillin-tazobactam (ZOSYN) 4.5 g in dextrose 5 % in water (D5W) 5 % 100 mL IVPB (MB+)         -- IV Every 8 hours (non-standard times) 04/13/23 0620        Latest lactate reviewed-  No results for input(s): LACTATE in the last 72 hours.  Organ dysfunction indicated by Acute kidney injury, Acute respiratory failure and Encephalopathy         Blood and respiratory cx negative  UA shows Enterococcus  Change to iv levaquin  Levophed weaned off

## 2023-04-16 NOTE — PROGRESS NOTES
[facility]  Progress Note    Patient Name: Lin Flowers  MRN: [id number]  Patient Class: IP- Inpatient   Admission Date: 4/12/2023  Length of Stay: 3 days  Attending Physician: Judy Aguirre MD  Primary Care Provider: Nilton Ndiaye MD        Subjective:     Principal Problem:Severe sepsis with septic shock        HPI:  86 yo female with hx of Dyslipidemia, Dementia, DM, who has not been feeling well reportedly in bed x 1 week.  She lives alone at home, her family was at her home on the evening of 04/12/2023 and pt had witnessed arrest.  Family called 911, did not immediately start CPR, First responders started CPR and pt reportedly intuabated on the scene.   On EMS arrival Pt in cardiorespiratory arrest, intubated continued on CPR another 15 mins, started on levophed in ED, Chest CTA shows bilateral Pes,  Rt sided multiple rib fractures and PTX. Pt unresponsive not on any sedatives, intubated family not at bedside. Reported Full code for now. Details of event from ED and pt family via telephone.  Pt intubated and sedated unable to answer any questions.  Pt CXR shows multiple rib fractures consistent with effective CPR and small pneumothorax.  Chest tube placed by Dr. Roca in ER and CXR pneumothorax at 10%.          Overview/Hospital Course:  04/14/2023 pt sedated on diprivan at 30mg/kg still on AC rate 20 breathing above vent up to 30.  Stopped diprivan and no meaningful responses or able to follow any commands.  Vitals have been stable overnight.  Chest tube dislodged with transfer from ER to ICU and Surgery replaced tube.  CXR this am show 15% PTX with significant subcutaneous emphysema consistent with effective CPR and recent rib fractures from CPR.  Pt does not arouse despite holding sedation.    04/15/2023 pt off sedation for an extended time yesterday had to start due to neurologic twitches overnight, stopped again at 0600 this am, poor neurologic responses, she  breathes over vent, however does not arouse, no purposeful movement, pupils are sluggish.  Pt was down for at least 15 minutes prior to CPR being started.    04/16/2023 pt off sedation x 24 hrs no meaningful responses, family in agreement wants to withdraw care of vent later today.  She does not arouse, does not follow any commands, pupils are minimally reactive and sluggish.            Review of Systems   Unable to perform ROS: Intubated   Objective:     Vital Signs (Most Recent):  Temp: 98.3 °F (36.8 °C) (04/16/23 0815)  Pulse: 81 (04/16/23 0930)  Resp: (!) 21 (04/16/23 0930)  BP: (!) 167/62 (04/16/23 0800)  SpO2: 98 % (04/16/23 0930)   Vital Signs (24h Range):  Temp:  [98.3 °F (36.8 °C)-100.9 °F (38.3 °C)] 98.3 °F (36.8 °C)  Pulse:  [] 81  Resp:  [18-32] 21  SpO2:  [91 %-100 %] 98 %  BP: (130-188)/(49-63) 167/62  Arterial Line BP: (102-214)/(42-87) 126/50     Weight: 61.8 kg (136 lb 3.9 oz)  Body mass index is 22.67 kg/m².    Physical Exam  Vitals and nursing note reviewed. Exam conducted with a chaperone present.   Constitutional:       Comments: Intubated    Held diprivan, no responses to stimulation, unable to arouse, does not follow commands   HENT:      Head:      Comments: ET tube, OGT in place  Neck:      Vascular: No carotid bruit.   Cardiovascular:      Rate and Rhythm: Normal rate and regular rhythm.      Pulses: Normal pulses.      Heart sounds: Normal heart sounds.   Pulmonary:      Effort: Pulmonary effort is normal.      Breath sounds: Normal breath sounds. No wheezing or rales.      Comments: Diminished RUL otherwise clear  Abdominal:      General: Bowel sounds are normal.      Palpations: Abdomen is soft.   Musculoskeletal:      Cervical back: Neck supple.   Lymphadenopathy:      Cervical: No cervical adenopathy.   Skin:     General: Skin is warm and dry.      Findings: No erythema, lesion or rash.   Neurological:      Comments: Intubated    Held sedation wtihout improvement in  responsiveness   Psychiatric:      Comments: Unable to assess           Significant Labs: All pertinent labs within the past 24 hours have been reviewed.  CBC:   Recent Labs   Lab 04/15/23  0421 04/16/23  0419   WBC 18.4* 15.8*   HGB 7.6* 7.4*   HCT 23.6* 22.7*    265       CMP:   Recent Labs   Lab 04/15/23  0421 04/16/23  0419    139   K 4.0 3.4*   CO2 22 24   BUN 13.0 11.0   CREATININE 0.58* 0.46*   CALCIUM 7.8* 7.7*   ALBUMIN 2.3* 2.2*   BILITOT 0.6 0.7   ALKPHOS 86 93   * 100*   ALT 55* 41         Significant Imaging: I have reviewed all pertinent imaging results/findings within the past 24 hours.      Assessment/Plan:      * Severe sepsis with septic shock  This patient does have evidence of infective focus  My overall impression is septic shock due to lactate > 4, MAP < 65 and SBP < 90.  Source: Urinary Tract  Antibiotics given-   Antibiotics (72h ago, onward)    Start     Stop Route Frequency Ordered    04/13/23 0930  mupirocin 2 % ointment         04/18 0859 Nasl 2 times daily 04/13/23 0820    04/13/23 0700  piperacillin-tazobactam (ZOSYN) 4.5 g in dextrose 5 % in water (D5W) 5 % 100 mL IVPB (MB+)         -- IV Every 8 hours (non-standard times) 04/13/23 0620        Latest lactate reviewed-  No results for input(s): LACTATE in the last 72 hours.  Organ dysfunction indicated by Acute kidney injury, Acute respiratory failure and Encephalopathy         Blood and respiratory cx negative  UA shows Enterococcus  Change to iv levaquin  Levophed weaned off     Anoxic brain injury  Family wants to withdraw care later today when family arrives      Acute hypoxemic respiratory failure  Patient with Hypoxic Respiratory failure which is Acute.  she is not on home oxygen. Supplemental oxygen was provided and noted- Vent Mode: Spont  Oxygen Concentration (%):  [25-28] 25  Resp Rate Total:  [17 br/min-28 br/min] 21 br/min  PEEP/CPAP:  [5 cmH20] 5 cmH20  Pressure Support:  [14 mfW41-92 cmH20] 16  cmH20  Mean Airway Pressure:  [9.8 tcX50-30 cmH20] 10 cmH20    .      Pt breathing over vent now on CPAP, however neurologic status is poor does not arouse or follow any commands or have any purposeful movement    Bilateral pulmonary embolism  Continue lovenox         Cardiac arrest    Pt with witnessed arrest  Did not meet criteria for hypothermia protocol  Down for at least 15 mintues prior to starting CPR when first responders arrived    COmplicated UTI  Change to iv levaquin  Blood cx ordered due to severe sepsis are negative so farF/u on cx      Hypomagnesemia  Magnesium reviewed-   Recent Labs   Lab 04/15/23  0421 04/16/23  0419   MG 2.10 1.90       and continue to monitor.           Hypokalemia  Patient with noted electrolyte deficiencies with Hypo-Kalemia. Latest electrolytes have been reviewed and values are   Potassium Level   Date Value Ref Range Status   04/16/2023 3.4 (L) 3.5 - 5.1 mmol/L Final   . Will continue to monitor electrolytes closely.    improved        VTE Risk Mitigation (From admission, onward)         Ordered     enoxaparin injection 50 mg  Every 12 hours (non-standard times)         04/13/23 0356                Discharge Planning   LANIE: 4/21/2023     Code Status: Full Code   Is the patient medically ready for discharge?:     Reason for patient still in hospital (select all that apply): Patient trending condition and Treatment  Discharge Plan A: Long-term acute care facility (LTAC)          Critical care time spent on the evaluation and treatment of severe organ dysfunction, review of pertinent labs and imaging studies, discussions with consulting providers and discussions with patient/family 43 minutes            Judy Aguirre MD  Department of Hospital Medicine   [facility]

## 2023-04-16 NOTE — ASSESSMENT & PLAN NOTE
Patient with noted electrolyte deficiencies with Hypo-Kalemia. Latest electrolytes have been reviewed and values are   Potassium Level   Date Value Ref Range Status   04/16/2023 3.4 (L) 3.5 - 5.1 mmol/L Final   . Will continue to monitor electrolytes closely.    improved

## 2023-04-16 NOTE — PT/OT/SLP PROGRESS
Physical Therapy Treatment    Patient Name:  Lin Flowers   MRN:  [id number]    Recommendations:     Discharge Recommendations: home, rehabilitation facility, nursing facility, skilled  Discharge Equipment Recommendations: none  Barriers to discharge: None    Assessment:     Lin Flowers is a 87 y.o. female admitted with a medical diagnosis of Severe sepsis with septic shock.  She presents with the following impairments/functional limitations: weakness, impaired functional mobility, decreased upper extremity function, decreased lower extremity function Family plans to withdraw care later today. .    Rehab Prognosis: Poor; patient would benefit from acute skilled PT services to address these deficits and reach maximum level of function.    Recent Surgery: * No surgery found *      Plan:     During this hospitalization, patient to be seen daily to address the identified rehab impairments via gait training, therapeutic activities, therapeutic exercises and progress toward the following goals:    Plan of Care Expires:  05/13/23    Subjective     Chief Complaint: weakness  Patient/Family Comments/goals: to get stronger  Pain/Comfort:  Pain Rating 1: 0/10      Objective:     Communicated with nursing prior to session.  Patient found supine with peripheral IV, arterial line, garcia catheter, NG tube, chest tube, ventilator, pulse ox (continuous), blood pressure cuff, telemetry upon PT entry to room.     General Precautions: Standard, fall  Orthopedic Precautions: N/A  Braces: N/A  Respiratory Status: Ventilator     Functional Mobility:        AM-PAC 6 CLICK MOBILITY  Turning over in bed (including adjusting bedclothes, sheets and blankets)?: 1  Sitting down on and standing up from a chair with arms (e.g., wheelchair, bedside commode, etc.): 1  Moving from lying on back to sitting on the side of the bed?: 1  Moving to and from a bed to a chair (including a wheelchair)?: 1  Need to walk in hospital room?: 1  Climbing  3-5 steps with a railing?: 1  Basic Mobility Total Score: 6       Treatment & Education:  Therapist provided AAROM to all BLE/BUE to increased ROM and decrease risk of contractures    Patient left supine with all lines intact and call button in reach..    GOALS:   Multidisciplinary Problems       Physical Therapy Goals          Problem: Physical Therapy    Goal Priority Disciplines Outcome Goal Variances Interventions   Physical Therapy Goal     PT, PT/OT Ongoing, Progressing     Description: Goals to be met by: discharge     Patient will increase functional independence with mobility by performin. Supine to sit with Stand-by Assistance  2. Sit to stand transfer with Stand-by Assistance                         Time Tracking:     PT Received On: 23  PT Start Time: 948     PT Stop Time: 958  PT Total Time (min): 10 min     Billable Minutes: Therapeutic Activity 10    Treatment Type: Treatment  PT/PTA: PT     Number of PTA visits since last PT visit: 0     2023

## 2023-04-16 NOTE — ASSESSMENT & PLAN NOTE
Magnesium reviewed-   Recent Labs   Lab 04/15/23  0421 04/16/23  0419   MG 2.10 1.90       and continue to monitor.

## 2023-04-16 NOTE — NURSING TRANSFER
Nursing Transfer Note      4/16/2023     Reason patient is being transferred: CHANGE IN STATUS    Transfer To: ROOM 207    Transfer via bed    Transfer with BELONGINGS     Transported by SARAH OJEDA RN    Medicines sent: YES    Any special needs or follow-up needed: CM-CONSULT TO HOSPICE    Chart send with patient: Yes    Notified: son    Patient reassessed at: UPON ARRIVAL TO UNIT     Upon arrival to floor: patient oriented to room, call bell in reach, and bed in lowest position, NURSE ZHANG ANTOINE TO RECEIVE PATIENT.

## 2023-04-16 NOTE — ASSESSMENT & PLAN NOTE
Patient with Hypoxic Respiratory failure which is Acute.  she is not on home oxygen. Supplemental oxygen was provided and noted- Vent Mode: Spont  Oxygen Concentration (%):  [25-28] 25  Resp Rate Total:  [17 br/min-28 br/min] 21 br/min  PEEP/CPAP:  [5 cmH20] 5 cmH20  Pressure Support:  [14 hjU67-20 cmH20] 16 cmH20  Mean Airway Pressure:  [9.8 doF39-54 cmH20] 10 cmH20    .      Pt breathing over vent now on CPAP, however neurologic status is poor does not arouse or follow any commands or have any purposeful movement

## 2023-04-16 NOTE — SUBJECTIVE & OBJECTIVE
Review of Systems   Unable to perform ROS: Intubated   Objective:     Vital Signs (Most Recent):  Temp: 98.3 °F (36.8 °C) (04/16/23 0815)  Pulse: 81 (04/16/23 0930)  Resp: (!) 21 (04/16/23 0930)  BP: (!) 167/62 (04/16/23 0800)  SpO2: 98 % (04/16/23 0930)   Vital Signs (24h Range):  Temp:  [98.3 °F (36.8 °C)-100.9 °F (38.3 °C)] 98.3 °F (36.8 °C)  Pulse:  [] 81  Resp:  [18-32] 21  SpO2:  [91 %-100 %] 98 %  BP: (130-188)/(49-63) 167/62  Arterial Line BP: (102-214)/(42-87) 126/50     Weight: 61.8 kg (136 lb 3.9 oz)  Body mass index is 22.67 kg/m².    Physical Exam  Vitals and nursing note reviewed. Exam conducted with a chaperone present.   Constitutional:       Comments: Intubated    Held diprivan, no responses to stimulation, unable to arouse, does not follow commands   HENT:      Head:      Comments: ET tube, OGT in place  Neck:      Vascular: No carotid bruit.   Cardiovascular:      Rate and Rhythm: Normal rate and regular rhythm.      Pulses: Normal pulses.      Heart sounds: Normal heart sounds.   Pulmonary:      Effort: Pulmonary effort is normal.      Breath sounds: Normal breath sounds. No wheezing or rales.      Comments: Diminished RUL otherwise clear  Abdominal:      General: Bowel sounds are normal.      Palpations: Abdomen is soft.   Musculoskeletal:      Cervical back: Neck supple.   Lymphadenopathy:      Cervical: No cervical adenopathy.   Skin:     General: Skin is warm and dry.      Findings: No erythema, lesion or rash.   Neurological:      Comments: Intubated    Held sedation wtihout improvement in responsiveness   Psychiatric:      Comments: Unable to assess           Significant Labs: All pertinent labs within the past 24 hours have been reviewed.  CBC:   Recent Labs   Lab 04/15/23  0421 04/16/23  0419   WBC 18.4* 15.8*   HGB 7.6* 7.4*   HCT 23.6* 22.7*    265       CMP:   Recent Labs   Lab 04/15/23  0421 04/16/23  0419    139   K 4.0 3.4*   CO2 22 24   BUN 13.0 11.0    CREATININE 0.58* 0.46*   CALCIUM 7.8* 7.7*   ALBUMIN 2.3* 2.2*   BILITOT 0.6 0.7   ALKPHOS 86 93   * 100*   ALT 55* 41         Significant Imaging: I have reviewed all pertinent imaging results/findings within the past 24 hours.

## 2023-04-17 ENCOUNTER — HOSPITAL ENCOUNTER (INPATIENT)
Facility: HOSPITAL | Age: 88
LOS: 7 days | Discharge: HOSPICE | DRG: 951 | End: 2023-04-24
Attending: FAMILY MEDICINE | Admitting: FAMILY MEDICINE
Payer: OTHER MISCELLANEOUS

## 2023-04-17 VITALS
HEART RATE: 61 BPM | HEIGHT: 65 IN | BODY MASS INDEX: 22.7 KG/M2 | OXYGEN SATURATION: 98 % | SYSTOLIC BLOOD PRESSURE: 143 MMHG | WEIGHT: 136.25 LBS | TEMPERATURE: 93 F | RESPIRATION RATE: 22 BRPM | DIASTOLIC BLOOD PRESSURE: 71 MMHG

## 2023-04-17 DIAGNOSIS — G93.1 ANOXIC BRAIN INJURY: ICD-10-CM

## 2023-04-17 PROBLEM — R56.9: Status: ACTIVE | Noted: 2023-04-17

## 2023-04-17 LAB
APTT PPP: 29.2 SECONDS (ref 23–29.4)
INR BLD: 1
MAGNESIUM SERPL-MCNC: 1.7 MG/DL (ref 1.8–2.4)
PHOSPHATE SERPL-MCNC: 2.9 MG/DL (ref 2.5–4.9)
POCT GLUCOSE: 131 MG/DL (ref 70–110)
PROTHROMBIN TIME: 10.2 SECONDS (ref 9.3–11.9)

## 2023-04-17 PROCEDURE — 25000003 PHARM REV CODE 250: Performed by: FAMILY MEDICINE

## 2023-04-17 PROCEDURE — C9113 INJ PANTOPRAZOLE SODIUM, VIA: HCPCS | Performed by: FAMILY MEDICINE

## 2023-04-17 PROCEDURE — 11000001 HC ACUTE MED/SURG PRIVATE ROOM

## 2023-04-17 PROCEDURE — 94761 N-INVAS EAR/PLS OXIMETRY MLT: CPT

## 2023-04-17 PROCEDURE — 84100 ASSAY OF PHOSPHORUS: CPT | Performed by: FAMILY MEDICINE

## 2023-04-17 PROCEDURE — 63600175 PHARM REV CODE 636 W HCPCS: Performed by: FAMILY MEDICINE

## 2023-04-17 PROCEDURE — 85730 THROMBOPLASTIN TIME PARTIAL: CPT | Performed by: FAMILY MEDICINE

## 2023-04-17 PROCEDURE — 36415 COLL VENOUS BLD VENIPUNCTURE: CPT | Performed by: FAMILY MEDICINE

## 2023-04-17 PROCEDURE — 85610 PROTHROMBIN TIME: CPT | Performed by: FAMILY MEDICINE

## 2023-04-17 PROCEDURE — 83735 ASSAY OF MAGNESIUM: CPT | Performed by: FAMILY MEDICINE

## 2023-04-17 RX ORDER — ONDANSETRON 2 MG/ML
4 INJECTION INTRAMUSCULAR; INTRAVENOUS EVERY 8 HOURS PRN
Status: DISCONTINUED | OUTPATIENT
Start: 2023-04-17 | End: 2023-04-24 | Stop reason: HOSPADM

## 2023-04-17 RX ORDER — ACETAMINOPHEN 650 MG/1
650 SUPPOSITORY RECTAL EVERY 4 HOURS PRN
Status: DISCONTINUED | OUTPATIENT
Start: 2023-04-17 | End: 2023-04-23

## 2023-04-17 RX ORDER — GLYCOPYRROLATE 0.2 MG/ML
0.3 INJECTION INTRAMUSCULAR; INTRAVENOUS EVERY 4 HOURS PRN
Status: DISCONTINUED | OUTPATIENT
Start: 2023-04-17 | End: 2023-04-24 | Stop reason: HOSPADM

## 2023-04-17 RX ORDER — LEVETIRACETAM 500 MG/5ML
250 INJECTION, SOLUTION, CONCENTRATE INTRAVENOUS EVERY 12 HOURS
Status: DISCONTINUED | OUTPATIENT
Start: 2023-04-17 | End: 2023-04-24 | Stop reason: HOSPADM

## 2023-04-17 RX ORDER — MORPHINE SULFATE 2 MG/ML
2 INJECTION, SOLUTION INTRAMUSCULAR; INTRAVENOUS EVERY 4 HOURS PRN
Status: DISCONTINUED | OUTPATIENT
Start: 2023-04-17 | End: 2023-04-24 | Stop reason: HOSPADM

## 2023-04-17 RX ORDER — FUROSEMIDE 10 MG/ML
20 INJECTION INTRAMUSCULAR; INTRAVENOUS ONCE
Status: COMPLETED | OUTPATIENT
Start: 2023-04-17 | End: 2023-04-17

## 2023-04-17 RX ORDER — MORPHINE SULFATE 2 MG/ML
2 INJECTION, SOLUTION INTRAMUSCULAR; INTRAVENOUS
Status: DISCONTINUED | OUTPATIENT
Start: 2023-04-17 | End: 2023-04-24 | Stop reason: HOSPADM

## 2023-04-17 RX ORDER — DIAZEPAM 10 MG/2ML
5 INJECTION INTRAMUSCULAR EVERY 4 HOURS PRN
Status: DISCONTINUED | OUTPATIENT
Start: 2023-04-17 | End: 2023-04-24 | Stop reason: HOSPADM

## 2023-04-17 RX ORDER — BISACODYL 10 MG
10 SUPPOSITORY, RECTAL RECTAL DAILY PRN
Status: DISCONTINUED | OUTPATIENT
Start: 2023-04-17 | End: 2023-04-24 | Stop reason: HOSPADM

## 2023-04-17 RX ORDER — MUPIROCIN 20 MG/G
OINTMENT TOPICAL 2 TIMES DAILY
Status: COMPLETED | OUTPATIENT
Start: 2023-04-17 | End: 2023-04-18

## 2023-04-17 RX ORDER — LEVETIRACETAM 500 MG/5ML
250 INJECTION, SOLUTION, CONCENTRATE INTRAVENOUS EVERY 12 HOURS
Status: DISCONTINUED | OUTPATIENT
Start: 2023-04-17 | End: 2023-04-17 | Stop reason: HOSPADM

## 2023-04-17 RX ORDER — LORAZEPAM 2 MG/ML
1 INJECTION INTRAMUSCULAR
Status: DISCONTINUED | OUTPATIENT
Start: 2023-04-17 | End: 2023-04-24 | Stop reason: HOSPADM

## 2023-04-17 RX ORDER — SODIUM CHLORIDE 0.9 % (FLUSH) 0.9 %
10 SYRINGE (ML) INJECTION
Status: DISCONTINUED | OUTPATIENT
Start: 2023-04-17 | End: 2023-04-24 | Stop reason: HOSPADM

## 2023-04-17 RX ADMIN — ENOXAPARIN SODIUM 50 MG: 60 INJECTION SUBCUTANEOUS at 08:04

## 2023-04-17 RX ADMIN — FAMOTIDINE 20 MG: 10 INJECTION INTRAVENOUS at 08:04

## 2023-04-17 RX ADMIN — PANTOPRAZOLE SODIUM 40 MG: 40 INJECTION, POWDER, FOR SOLUTION INTRAVENOUS at 08:04

## 2023-04-17 RX ADMIN — FUROSEMIDE 20 MG: 10 INJECTION, SOLUTION INTRAMUSCULAR; INTRAVENOUS at 11:04

## 2023-04-17 RX ADMIN — LEVETIRACETAM 250 MG: 100 INJECTION, SOLUTION INTRAVENOUS at 09:04

## 2023-04-17 RX ADMIN — LEVETIRACETAM 250 MG: 100 INJECTION, SOLUTION INTRAVENOUS at 11:04

## 2023-04-17 RX ADMIN — MUPIROCIN: 20 OINTMENT TOPICAL at 09:04

## 2023-04-17 RX ADMIN — MUPIROCIN: 20 OINTMENT TOPICAL at 08:04

## 2023-04-17 RX ADMIN — SODIUM CHLORIDE: 9 INJECTION, SOLUTION INTRAVENOUS at 04:04

## 2023-04-17 NOTE — H&P
[facility]/Surg  [facility] Medicine  History & Physical  HOSPICE GIP    Patient Name: Lin Flowers  MRN: [id number]  Patient Class: IP- Hospice  Admission Date: 4/17/2023  Attending Physician: Judy Aguirre MD   Primary Care Provider: Nilton Ndiaye MD         Patient information was obtained from relative(s), ER records and primary team.     Subjective:     Principal Problem:Anoxic brain injury    Chief Complaint:   Chief Complaint   Patient presents with    hospice admit        HPI: 88 yo who had cardiac arrest at home witnessed by family, was down for over 15 minutes prior to institution of CPR, was brought back and placed on vent, treated for sepsis and UTI with physical improvements, however her mental status has not improved at all she was extubated on 04/16/2023 per family agreement and has been with stable vitals and breathing but not responding to any stimuli.  She has been having twitching of face this am which is felt to be seixures.  We started her on some keppra iv.  She is getting morphine prn at this time.  [facility] was consulted and is admitting pt to GIP services at this time.      Past Medical History:   Diagnosis Date    Alzheimer's disease, unspecified (CODE)     Asthma     High cholesterol     Kidney stones        History reviewed. No pertinent surgical history.    Review of patient's allergies indicates:  No Known Allergies    Current Facility-Administered Medications on File Prior to Encounter   Medication    [COMPLETED] furosemide injection 20 mg    [COMPLETED] pantoprazole injection 40 mg    [DISCONTINUED] 0.9%  NaCl infusion    [DISCONTINUED] dextrose 10% bolus 125 mL 125 mL    [DISCONTINUED] dextrose 10% bolus 250 mL 250 mL    [DISCONTINUED] enoxaparin injection 50 mg    [DISCONTINUED] famotidine (PF) injection 20 mg    [DISCONTINUED] glucagon (human recombinant) injection 1 mg    [DISCONTINUED] insulin aspart U-100 pen 1-10 Units     [DISCONTINUED] levETIRAcetam (KEPPRA) 250.5 mg in sodium chloride 0.9% 16.7 mL IV Syringe    [DISCONTINUED] levETIRAcetam injection 250 mg    [DISCONTINUED] levoFLOXacin 750 mg/150 mL IVPB 750 mg    [DISCONTINUED] morphine injection 2 mg    [DISCONTINUED] mupirocin 2 % ointment    [DISCONTINUED] NORepinephrine 8 mg in dextrose 5% 250 mL infusion    [DISCONTINUED] ondansetron injection 4 mg    [DISCONTINUED] propofol (DIPRIVAN) 10 mg/mL infusion    [DISCONTINUED] sodium chloride 0.9% flush 10 mL     Current Outpatient Medications on File Prior to Encounter   Medication Sig    donepeziL (ARICEPT) 10 MG tablet Take 10 mg by mouth every evening.    montelukast (SINGULAIR) 10 mg tablet Take 10 mg by mouth once daily.    multivitamin capsule Take 1 capsule by mouth once daily.    pantoprazole (PROTONIX) 40 MG tablet Take 40 mg by mouth once daily.    SYMBICORT 160-4.5 mcg/actuation HFAA 2 puffs 2 (two) times daily.    vitamin E 100 UNIT capsule Take 100 Units by mouth once daily.     Family History    None       Tobacco Use    Smoking status: Never    Smokeless tobacco: Never   Substance and Sexual Activity    Alcohol use: Never    Drug use: Never    Sexual activity: Not on file     Review of Systems   Unable to perform ROS: Mental status change   Objective:     Vital Signs (Most Recent):  Temp: 96 °F (35.6 °C) (04/17/23 1407)  Pulse: 63 (04/17/23 1407)  Resp: 20 (04/17/23 1407)  BP: 127/73 (04/17/23 1407)  SpO2: 98 % (04/17/23 1407) Vital Signs (24h Range):  Temp:  [93 °F (33.9 °C)-97 °F (36.1 °C)] 96 °F (35.6 °C)  Pulse:  [61-74] 63  Resp:  [19-22] 20  SpO2:  [96 %-98 %] 98 %  BP: (127-155)/(58-81) 127/73  Arterial Line BP: (136)/(53-54) 136/53     Weight: 61.7 kg (136 lb)  Body mass index is 22.63 kg/m².    Physical Exam  Constitutional:       General: She is not in acute distress.     Appearance: She is not ill-appearing.      Comments: She is resting comfortably, occasional facial twitches thought  to be seizures.  No meaningful responses, UE and LE are flaccid without any resistance.   Cardiovascular:      Rate and Rhythm: Normal rate and regular rhythm.      Pulses: Normal pulses.      Heart sounds: Normal heart sounds.   Pulmonary:      Comments: Breath sounds are equal and clear, shallow  Abdominal:      General: Bowel sounds are normal.      Palpations: Abdomen is soft.   Skin:     General: Skin is warm and dry.      Findings: No erythema, lesion or rash.   Neurological:      Comments: Does not respond to stimuli           Significant Labs: All pertinent labs within the past 24 hours have been reviewed.    Significant Imaging: I have reviewed all pertinent imaging results/findings within the past 24 hours.    Assessment/Plan:     * Anoxic brain injury  Pt admitted to hospice GIP      Anoxic seizures    keppra iv  Morphine prn for pain      VTE Risk Mitigation (From admission, onward)    None                     Judy Aguirre MD  Department of Hospital Medicine  [facility] [place]/Surg

## 2023-04-17 NOTE — PROGRESS NOTES
"Inpatient Nutrition Evaluation    Admit Date: 4/17/2023   Total duration of encounter: 1 day    Nutrition Recommendation/Prescription     Continue current MNT as medically appropriate.     RD to sign off on nutritional support due to admittance to GIP. If family and MD desire nutrition support please consult dietitian. Thank you.    Nutrition Assessment     Chart Review    Reason Seen: follow-up    Malnutrition Screening Tool Results   Have you recently lost weight without trying?: No  Have you been eating poorly because of a decreased appetite?: Yes (UNRESPONSIVE)   MST Score: 1     Diagnosis:  Severe Sepsis with Septic Shock, Anoxic Brain Injury, Acute Hypoxemic Respiratory Failure, Bilateral Pulmonary Embolism, Cardiac Arrest, Complicated UTI, Hypomagnesemia, Hypokalemia.         Diet Order: Diet NPO  Oral Supplement Order: none  Appetite/Oral Intake: not applicable/not applicable  Factors Affecting Nutritional Intake: impaired cognitive status/motor control  Food/Adventism/Cultural Preferences: none reported  Food Allergies: none reported and no known food allergies    Skin Integrity: bruised (ecchymotic)  Wound(s):       Comments    (4/17): Patient was being followed by RD in ICU tube feed recommendations given. Patient now discharged and admitted under [facility].     Anthropometrics    Height: 5' 5" (165.1 cm)    Last Weight: 61.7 kg (136 lb) (04/17/23 1407) Weight Method: Bed Scale  BMI (Calculated): 22.6  BMI Classification: normal (BMI 18.5-24.9)     Ideal Body Weight (IBW), Female: 125 lb     % Ideal Body Weight, Female (lb): 108.8 %                             Usual Weight Provided By: EMR weight history    Wt Readings from Last 5 Encounters:   04/17/23 61.7 kg (136 lb)   04/15/23 61.8 kg (136 lb 3.9 oz)   03/03/23 52.5 kg (115 lb 11.2 oz)     Weight Change(s) Since Admission:  Admit Weight: 61.7 kg (136 lb) (04/17/23 1407)      Patient Education    Not applicable.    Monitoring & Evaluation     Dietitian will " monitor weight, family intentions, MD decisions and consults for Nutritional Support.   Nutrition Risk/Follow-Up:  RD to sign off on nutritional support due to admittance to [facility]. If family and MD desire nutrition support please consult dietitian. Thank you.

## 2023-04-17 NOTE — PLAN OF CARE
Problem: Adult Inpatient Plan of Care  Goal: Plan of Care Review  Outcome: Ongoing, Progressing  Goal: Patient-Specific Goal (Individualized)  Outcome: Ongoing, Progressing  Goal: Absence of Hospital-Acquired Illness or Injury  Outcome: Ongoing, Progressing  Goal: Optimal Comfort and Wellbeing  Outcome: Ongoing, Progressing  Goal: Readiness for Transition of Care  Outcome: Ongoing, Progressing     Problem: Infection  Goal: Absence of Infection Signs and Symptoms  Outcome: Ongoing, Progressing     Problem: Bleeding (Sepsis/Septic Shock)  Goal: Absence of Bleeding  Outcome: Ongoing, Progressing     Problem: Glycemic Control Impaired (Sepsis/Septic Shock)  Goal: Blood Glucose Level Within Desired Range  Outcome: Ongoing, Progressing     Problem: Infection Progression (Sepsis/Septic Shock)  Goal: Absence of Infection Signs and Symptoms  Outcome: Ongoing, Progressing     Problem: Fall Injury Risk  Goal: Absence of Fall and Fall-Related Injury  Outcome: Ongoing, Progressing     Problem: Skin Injury Risk Increased  Goal: Skin Health and Integrity  Outcome: Ongoing, Progressing

## 2023-04-17 NOTE — SUBJECTIVE & OBJECTIVE
Past Medical History:   Diagnosis Date    Alzheimer's disease, unspecified (CODE)     Asthma     High cholesterol     Kidney stones        History reviewed. No pertinent surgical history.    Review of patient's allergies indicates:  No Known Allergies    Current Facility-Administered Medications on File Prior to Encounter   Medication    [COMPLETED] furosemide injection 20 mg    [COMPLETED] pantoprazole injection 40 mg    [DISCONTINUED] 0.9%  NaCl infusion    [DISCONTINUED] dextrose 10% bolus 125 mL 125 mL    [DISCONTINUED] dextrose 10% bolus 250 mL 250 mL    [DISCONTINUED] enoxaparin injection 50 mg    [DISCONTINUED] famotidine (PF) injection 20 mg    [DISCONTINUED] glucagon (human recombinant) injection 1 mg    [DISCONTINUED] insulin aspart U-100 pen 1-10 Units    [DISCONTINUED] levETIRAcetam (KEPPRA) 250.5 mg in sodium chloride 0.9% 16.7 mL IV Syringe    [DISCONTINUED] levETIRAcetam injection 250 mg    [DISCONTINUED] levoFLOXacin 750 mg/150 mL IVPB 750 mg    [DISCONTINUED] morphine injection 2 mg    [DISCONTINUED] mupirocin 2 % ointment    [DISCONTINUED] NORepinephrine 8 mg in dextrose 5% 250 mL infusion    [DISCONTINUED] ondansetron injection 4 mg    [DISCONTINUED] propofol (DIPRIVAN) 10 mg/mL infusion    [DISCONTINUED] sodium chloride 0.9% flush 10 mL     Current Outpatient Medications on File Prior to Encounter   Medication Sig    donepeziL (ARICEPT) 10 MG tablet Take 10 mg by mouth every evening.    montelukast (SINGULAIR) 10 mg tablet Take 10 mg by mouth once daily.    multivitamin capsule Take 1 capsule by mouth once daily.    pantoprazole (PROTONIX) 40 MG tablet Take 40 mg by mouth once daily.    SYMBICORT 160-4.5 mcg/actuation HFAA 2 puffs 2 (two) times daily.    vitamin E 100 UNIT capsule Take 100 Units by mouth once daily.     Family History    None       Tobacco Use    Smoking status: Never    Smokeless tobacco: Never   Substance and Sexual Activity    Alcohol use: Never    Drug use: Never    Sexual  activity: Not on file     Review of Systems   Unable to perform ROS: Mental status change   Objective:     Vital Signs (Most Recent):  Temp: 96 °F (35.6 °C) (04/17/23 1407)  Pulse: 63 (04/17/23 1407)  Resp: 20 (04/17/23 1407)  BP: 127/73 (04/17/23 1407)  SpO2: 98 % (04/17/23 1407) Vital Signs (24h Range):  Temp:  [93 °F (33.9 °C)-97 °F (36.1 °C)] 96 °F (35.6 °C)  Pulse:  [61-74] 63  Resp:  [19-22] 20  SpO2:  [96 %-98 %] 98 %  BP: (127-155)/(58-81) 127/73  Arterial Line BP: (136)/(53-54) 136/53     Weight: 61.7 kg (136 lb)  Body mass index is 22.63 kg/m².    Physical Exam  Constitutional:       General: She is not in acute distress.     Appearance: She is not ill-appearing.      Comments: She is resting comfortably, occasional facial twitches thought to be seizures.  No meaningful responses, UE and LE are flaccid without any resistance.   Cardiovascular:      Rate and Rhythm: Normal rate and regular rhythm.      Pulses: Normal pulses.      Heart sounds: Normal heart sounds.   Pulmonary:      Comments: Breath sounds are equal and clear, shallow  Abdominal:      General: Bowel sounds are normal.      Palpations: Abdomen is soft.   Skin:     General: Skin is warm and dry.      Findings: No erythema, lesion or rash.   Neurological:      Comments: Does not respond to stimuli           Significant Labs: All pertinent labs within the past 24 hours have been reviewed.    Significant Imaging: I have reviewed all pertinent imaging results/findings within the past 24 hours.

## 2023-04-17 NOTE — HPI
88 yo who had cardiac arrest at home witnessed by family, was down for over 15 minutes prior to institution of CPR, was brought back and placed on vent, treated for sepsis and UTI with physical improvements, however her mental status has not improved at all she was extubated on 04/16/2023 per family agreement and has been with stable vitals and breathing but not responding to any stimuli.  She has been having twitching of face this am which is felt to be seixures.  We started her on some keppra iv.  She is getting morphine prn at this time.  [facility] was [facility] and is admitting pt to GIP services at this time.

## 2023-04-17 NOTE — DISCHARGE SUMMARY
[facility]/Surg  [facility] Medicine  Discharge Summary      Patient Name: Lin Flowers  MRN: [id number]  KIM: [id number]  Patient Class: IP- Inpatient  Admission Date: 4/12/2023  Hospital Length of Stay: 4 days  Discharge Date and Time:  04/17/2023 2:43 PM  Attending Physician: No att. providers found   Discharging Provider: Judy Aguirre MD  Primary Care Provider: Nilton Ndiaye MD    Primary Care Team: Networked reference to record PCT     HPI:   86 yo female with hx of Dyslipidemia, Dementia, DM, who has not been feeling well reportedly in bed x 1 week.  She lives alone at home, her family was at her home on the evening of 04/12/2023 and pt had witnessed arrest.  Family called 911, did not immediately start CPR, First responders started CPR and pt reportedly intuabated on the scene.   On EMS arrival Pt in cardiorespiratory arrest, intubated continued on CPR another 15 mins, started on levophed in ED, Chest CTA shows bilateral Pes,  Rt sided multiple rib fractures and PTX. Pt unresponsive not on any sedatives, intubated family not at bedside. Reported Full code for now. Details of event from ED and pt family via telephone.  Pt intubated and sedated unable to answer any questions.  Pt CXR shows multiple rib fractures consistent with effective CPR and small pneumothorax.  Chest tube placed by Dr. Roca in ER and CXR pneumothorax at 10%.          * No surgery found *      Hospital Course:   04/14/2023 pt sedated on diprivan at 30mg/kg still on AC rate 20 breathing above vent up to 30.  Stopped diprivan and no meaningful responses or able to follow any commands.  Vitals have been stable overnight.  Chest tube dislodged with transfer from ER to ICU and Surgery replaced tube.  CXR this am show 15% PTX with significant subcutaneous emphysema consistent with effective CPR and recent rib fractures from CPR.  Pt does not arouse despite holding sedation.    04/15/2023 pt off sedation for an extended  time yesterday had to start due to neurologic twitches overnight, stopped again at 0600 this am, poor neurologic responses, she breathes over vent, however does not arouse, no purposeful movement, pupils are sluggish.  Pt was down for at least 15 minutes prior to CPR being started.    04/16/2023 pt off sedation x 24 hrs no meaningful responses, family in agreement wants to withdraw care of vent later today.  She does not arouse, does not follow any commands, pupils are minimally reactive and sluggish.    04/17/2023 pt extubated and worsening no neurologic responses of meaning ful nature, family has met with hospice and desires hospice care will transfer to [facility]       Goals of Care Treatment Preferences:  Code Status: DNR      Consults:   Consults (From admission, onward)        Status Ordering Provider     Inpatient consult to Registered Dietitian/Nutritionist  Once        Provider:  (Not yet assigned)    NISSA Moody     Inpatient consult to Cardiology  Once        Provider:  Jerome Burkett MD    Completed NISSA MIRANDA     Inpatient consult to General Surgery  Once        Provider:  Leonor Roca MD    Acknowledged NISSA MIRANDA          No new Assessment & Plan notes have been filed under this hospital service since the last note was generated.  Service: Hospital Medicine    Final Active Diagnoses:    Diagnosis Date Noted POA    PRINCIPAL PROBLEM:  Severe sepsis with septic shock [A41.9, R65.21] 04/13/2023 Yes    Anoxic brain injury [G93.1] 04/16/2023 Yes    Bilateral pulmonary embolism [I26.99] 04/13/2023 Yes    Acute hypoxemic respiratory failure [J96.01] 04/13/2023 Yes    Cardiac arrest [I46.9] 04/13/2023 Yes    COmplicated UTI [N39.0] 04/13/2023 Yes    Hypomagnesemia [E83.42] 04/14/2023 Yes    Hypokalemia [E87.6] 04/14/2023 Yes      Problems Resolved During this Admission:       Discharged Condition: critical    Disposition: Hospice/Medical  Facility    Follow Up:   Follow-up Information     Nilton Ndiaye MD Follow up.    Specialty: Internal Medicine  Contact information:  [address] [place] [id number] [phone]                       Patient Instructions:   No discharge procedures on file.    Significant Diagnostic Studies: Labs:   BMP:   Recent Labs   Lab 04/16/23  0419 04/17/23  0447     --    K 3.4*  --    CO2 24  --    BUN 11.0  --    CREATININE 0.46*  --    CALCIUM 7.7*  --    MG 1.90 1.70*       Pending Diagnostic Studies:     None         Medications:  Transfer Medications (for Discharge Readmit only):   No current facility-administered medications for this encounter.     No current outpatient medications on file.     Facility-Administered Medications Ordered in Other Encounters   Medication Dose Route Frequency Provider Last Rate Last Admin    morphine injection 2 mg  2 mg Intravenous Q1H PRN Judy Aguirre MD        mupirocin 2 % ointment   Nasal BID Judy Aguirre MD        sodium chloride 0.9% flush 10 mL  10 mL Intravenous PRN Judy Aguirre MD           Indwelling Lines/Drains at time of discharge:   Lines/Drains/Airways     Drain  Duration                Chest Tube 04/13/23 1300 Tube - 1 Right Fourth intercostal space 28 Fr. 4 days         Urethral Catheter 04/12/23 2334 16 Fr. 4 days                Time spent on the discharge of patient: 38 minutes     Physical Exam  Constitutional:       General: She is not in acute distress.     Appearance: She is not ill-appearing.      Comments: Pt pupils small very sluggish with minimal reactions, no corneal reflexes, no gag, no spontaneous movements, UE and LE are flaccid.   Cardiovascular:      Rate and Rhythm: Normal rate and regular rhythm.      Pulses: Normal pulses.      Heart sounds: Normal heart sounds.   Pulmonary:      Comments: Shallow but clear  Abdominal:      Palpations: Abdomen is soft.      Tenderness: There is no abdominal tenderness.   Skin:      General: Skin is warm and dry.   Neurological:      Comments: No responses to stimuli         Judy Aguirre MD  Department of Hospital Medicine  [facility]/Surg

## 2023-04-17 NOTE — NURSING
MILLY Mcneill, RN with [facility] present to speak with patient's family regarding hospice services. Patient currently does not meet GIP eligibility, [facility] to reevaluate on Monday 4/17/2023, family in agreement.

## 2023-04-18 LAB
BACTERIA BLD CULT: NORMAL
BACTERIA BLD CULT: NORMAL

## 2023-04-18 PROCEDURE — 25000003 PHARM REV CODE 250: Performed by: FAMILY MEDICINE

## 2023-04-18 PROCEDURE — 63600175 PHARM REV CODE 636 W HCPCS: Performed by: FAMILY MEDICINE

## 2023-04-18 PROCEDURE — 94761 N-INVAS EAR/PLS OXIMETRY MLT: CPT

## 2023-04-18 PROCEDURE — 11000001 HC ACUTE MED/SURG PRIVATE ROOM

## 2023-04-18 RX ADMIN — MUPIROCIN: 20 OINTMENT TOPICAL at 09:04

## 2023-04-18 RX ADMIN — LEVETIRACETAM 250 MG: 100 INJECTION, SOLUTION INTRAVENOUS at 09:04

## 2023-04-18 RX ADMIN — GLYCOPYRROLATE 0.3 MG: 0.2 INJECTION INTRAMUSCULAR; INTRAVENOUS at 05:04

## 2023-04-18 RX ADMIN — GLYCOPYRROLATE 0.3 MG: 0.2 INJECTION INTRAMUSCULAR; INTRAVENOUS at 10:04

## 2023-04-18 NOTE — PROGRESS NOTES
[facility]/Surg  General Surgery  Progress Note    Subjective:     Interval History:  Status post cardiopulmonary resuscitation status post chest tube placement for traumatic pneumothorax following chest compressions for resuscitation.  Patient has been placed on hospice care.  Pneumothorax has remained stable for 24 hours while under water seal.  Chest tube will be removed at the bedside today.    Post-Op Info:  * No surgery found *          Medications:  Continuous Infusions:  Scheduled Meds:   levetiracetam IV  250 mg Intravenous Q12H    mupirocin   Nasal BID     PRN Meds:acetaminophen, bisacodyL, diazePAM, glycopyrrolate, lorazepam, morphine, morphine, ondansetron, peg 400-hypromellose-glycerin, sodium chloride 0.9%     Objective:     Vital Signs (Most Recent):  Temp: 96 °F (35.6 °C) (04/18/23 0701)  Pulse: 75 (04/18/23 0815)  Resp: 18 (04/18/23 0815)  BP: 105/71 (04/18/23 0701)  SpO2: 98 % (04/18/23 0815) Vital Signs (24h Range):  Temp:  [92.8 °F (33.8 °C)-97.2 °F (36.2 °C)] 96 °F (35.6 °C)  Pulse:  [65-75] 75  Resp:  [18-20] 18  SpO2:  [96 %-98 %] 98 %  BP: (105-137)/(70-81) 105/71       Intake/Output Summary (Last 24 hours) at 4/18/2023 1434  Last data filed at 4/18/2023 1416  Gross per 24 hour   Intake --   Output 225 ml   Net -225 ml       Physical Exam    Significant Labs:  None    Significant Diagnostics:  CXR: I have reviewed all pertinent results/findings within the past 24 hours. .    Assessment/Plan:     Active Diagnoses:    Diagnosis Date Noted POA    PRINCIPAL PROBLEM:  Anoxic brain injury [G93.1] 04/16/2023 Yes    Anoxic seizures [R56.9] 04/17/2023 Yes      Problems Resolved During this Admission:     Chest thoracostomy tube has been removed at the bedside without difficulty.  Occlusive dressing in place.  Patient's daughters at the bedside expresses understanding of the current condition of her mother.  Patient has been transitioned to hospice and comfort care.    Leonor Roca,  MD  General Surgery  [facility]/Surg

## 2023-04-18 NOTE — HOSPITAL COURSE
04/18/2023 rest in bed no distress  04/19/2023 no change overnight   04/20/2023 appears comfortable no distress  04/21/2023 dose morphine uncomfortable overnight   04/22/2023 no major change overnight   04/23/2023 no changes overnight

## 2023-04-18 NOTE — NURSING
Spoke with  notified of Chest Xray results, new orders noted on chart to place chest tube back to suction and he will be by later today to pull chest tube out.

## 2023-04-18 NOTE — SUBJECTIVE & OBJECTIVE
Past Medical History:   Diagnosis Date    Alzheimer's disease, unspecified (CODE)     Asthma     High cholesterol     Kidney stones        History reviewed. No pertinent surgical history.    Review of patient's allergies indicates:  No Known Allergies    No current facility-administered medications on file prior to encounter.     Current Outpatient Medications on File Prior to Encounter   Medication Sig    donepeziL (ARICEPT) 10 MG tablet Take 10 mg by mouth every evening.    montelukast (SINGULAIR) 10 mg tablet Take 10 mg by mouth once daily.    multivitamin capsule Take 1 capsule by mouth once daily.    pantoprazole (PROTONIX) 40 MG tablet Take 40 mg by mouth once daily.    SYMBICORT 160-4.5 mcg/actuation HFAA 2 puffs 2 (two) times daily.    vitamin E 100 UNIT capsule Take 100 Units by mouth once daily.     Family History    None       Tobacco Use    Smoking status: Never    Smokeless tobacco: Never   Substance and Sexual Activity    Alcohol use: Never    Drug use: Never    Sexual activity: Not on file     Review of Systems   Unable to perform ROS: Mental status change   Objective:     Vital Signs (Most Recent):  Temp: 96 °F (35.6 °C) (04/18/23 0701)  Pulse: 75 (04/18/23 0815)  Resp: 18 (04/18/23 0815)  BP: 105/71 (04/18/23 0701)  SpO2: 98 % (04/18/23 0815) Vital Signs (24h Range):  Temp:  [92.8 °F (33.8 °C)-97.2 °F (36.2 °C)] 96 °F (35.6 °C)  Pulse:  [65-75] 75  Resp:  [18-20] 18  SpO2:  [96 %-98 %] 98 %  BP: (105-137)/(70-81) 105/71     Weight: 61.7 kg (136 lb)  Body mass index is 22.63 kg/m².    Physical Exam  Constitutional:       General: She is not in acute distress.     Appearance: She is not ill-appearing.      Comments: She is resting comfortably, occasional facial twitches thought to be seizures.  No meaningful responses, UE and LE are flaccid without any resistance.   Cardiovascular:      Rate and Rhythm: Normal rate and regular rhythm.      Pulses: Normal pulses.      Heart sounds: Normal heart sounds.    Pulmonary:      Comments: Breath sounds are equal and clear, shallow  Abdominal:      General: Bowel sounds are normal.      Palpations: Abdomen is soft.   Skin:     General: Skin is warm and dry.      Findings: No erythema, lesion or rash.   Neurological:      Comments: Does not respond to stimuli           Significant Labs: All pertinent labs within the past 24 hours have been reviewed.    Significant Imaging: I have reviewed all pertinent imaging results/findings within the past 24 hours.

## 2023-04-18 NOTE — PROGRESS NOTES
[facility]/Surg  [facility] Medicine  Progress Note    Patient Name: Lin Flowers  MRN: [id number]  Patient Class: IP- Hospice   Admission Date: 4/17/2023  Length of Stay: 1 days  Attending Physician: Judy Aguirre MD  Primary Care Provider: Nilton Ndiaye MD        Subjective:     Principal Problem:Anoxic brain injury        HPI:  88 yo who had cardiac arrest at home witnessed by family, was down for over 15 minutes prior to institution of CPR, was brought back and placed on vent, treated for sepsis and UTI with physical improvements, however her mental status has not improved at all she was extubated on 04/16/2023 per family agreement and has been with stable vitals and breathing but not responding to any stimuli.  She has been having twitching of face this am which is felt to be seixures.  We started her on some keppra iv.  She is getting morphine prn at this time.  [facility] and is admitting pt to GIP services at this time.      Overview/Hospital Course:  04/18/2023 rest in bed no distress      Past Medical History:   Diagnosis Date    Alzheimer's disease, unspecified (CODE)     Asthma     High cholesterol     Kidney stones        History reviewed. No pertinent surgical history.    Review of patient's allergies indicates:  No Known Allergies    No current facility-administered medications on file prior to encounter.     Current Outpatient Medications on File Prior to Encounter   Medication Sig    donepeziL (ARICEPT) 10 MG tablet Take 10 mg by mouth every evening.    montelukast (SINGULAIR) 10 mg tablet Take 10 mg by mouth once daily.    multivitamin capsule Take 1 capsule by mouth once daily.    pantoprazole (PROTONIX) 40 MG tablet Take 40 mg by mouth once daily.    SYMBICORT 160-4.5 mcg/actuation HFAA 2 puffs 2 (two) times daily.    vitamin E 100 UNIT capsule Take 100 Units by mouth once daily.     Family History    None       Tobacco Use    Smoking  status: Never    Smokeless tobacco: Never   Substance and Sexual Activity    Alcohol use: Never    Drug use: Never    Sexual activity: Not on file     Review of Systems   Unable to perform ROS: Mental status change   Objective:     Vital Signs (Most Recent):  Temp: 96 °F (35.6 °C) (04/18/23 0701)  Pulse: 75 (04/18/23 0815)  Resp: 18 (04/18/23 0815)  BP: 105/71 (04/18/23 0701)  SpO2: 98 % (04/18/23 0815) Vital Signs (24h Range):  Temp:  [92.8 °F (33.8 °C)-97.2 °F (36.2 °C)] 96 °F (35.6 °C)  Pulse:  [65-75] 75  Resp:  [18-20] 18  SpO2:  [96 %-98 %] 98 %  BP: (105-137)/(70-81) 105/71     Weight: 61.7 kg (136 lb)  Body mass index is 22.63 kg/m².    Physical Exam  Constitutional:       General: She is not in acute distress.     Appearance: She is not ill-appearing.      Comments: She is resting comfortably, occasional facial twitches thought to be seizures.  No meaningful responses, UE and LE are flaccid without any resistance.   Cardiovascular:      Rate and Rhythm: Normal rate and regular rhythm.      Pulses: Normal pulses.      Heart sounds: Normal heart sounds.   Pulmonary:      Comments: Breath sounds are equal and clear, shallow  Abdominal:      General: Bowel sounds are normal.      Palpations: Abdomen is soft.   Skin:     General: Skin is warm and dry.      Findings: No erythema, lesion or rash.   Neurological:      Comments: Does not respond to stimuli           Significant Labs: All pertinent labs within the past 24 hours have been reviewed.    Significant Imaging: I have reviewed all pertinent imaging results/findings within the past 24 hours.      Assessment/Plan:      * Anoxic brain injury  Pt admitted to hospice GIP    Cont comfort measures      Anoxic seizures    keppra iv  Morphine prn for pain      VTE Risk Mitigation (From admission, onward)    None          Discharge Planning   LANIE:      Code Status: DNR   Is the patient medically ready for discharge?:     Reason for patient still in hospital  (select all that apply): Patient trending condition and Treatment                     Mo Fine MD  Department of Hospital Medicine   [facility]

## 2023-04-18 NOTE — PLAN OF CARE
Problem: Adult Inpatient Plan of Care  Goal: Plan of Care Review  Outcome: Ongoing, Progressing  Goal: Patient-Specific Goal (Individualized)  Outcome: Ongoing, Progressing  Goal: Absence of Hospital-Acquired Illness or Injury  Outcome: Ongoing, Progressing  Goal: Optimal Comfort and Wellbeing  Outcome: Ongoing, Progressing  Goal: Readiness for Transition of Care  Outcome: Ongoing, Progressing     Problem: Adjustment to Illness (Sepsis/Septic Shock)  Goal: Optimal Coping  Outcome: Ongoing, Progressing     Problem: Bleeding (Sepsis/Septic Shock)  Goal: Absence of Bleeding  Outcome: Ongoing, Progressing     Problem: Glycemic Control Impaired (Sepsis/Septic Shock)  Goal: Blood Glucose Level Within Desired Range  Outcome: Ongoing, Progressing     Problem: Infection Progression (Sepsis/Septic Shock)  Goal: Absence of Infection Signs and Symptoms  Outcome: Ongoing, Progressing     Problem: Nutrition Impaired (Sepsis/Septic Shock)  Goal: Optimal Nutrition Intake  Outcome: Ongoing, Progressing     Problem: Infection  Goal: Absence of Infection Signs and Symptoms  Outcome: Ongoing, Progressing     Problem: Fall Injury Risk  Goal: Absence of Fall and Fall-Related Injury  Outcome: Ongoing, Progressing     Problem: Skin Injury Risk Increased  Goal: Skin Health and Integrity  Outcome: Ongoing, Progressing

## 2023-04-19 PROCEDURE — 94761 N-INVAS EAR/PLS OXIMETRY MLT: CPT

## 2023-04-19 PROCEDURE — 99900026 HC AIRWAY MAINTENANCE (STAT)

## 2023-04-19 PROCEDURE — 99900035 HC TECH TIME PER 15 MIN (STAT)

## 2023-04-19 PROCEDURE — 11000001 HC ACUTE MED/SURG PRIVATE ROOM

## 2023-04-19 PROCEDURE — 63600175 PHARM REV CODE 636 W HCPCS: Performed by: FAMILY MEDICINE

## 2023-04-19 RX ADMIN — MORPHINE SULFATE 2 MG: 2 INJECTION, SOLUTION INTRAMUSCULAR; INTRAVENOUS at 01:04

## 2023-04-19 RX ADMIN — LEVETIRACETAM 250 MG: 100 INJECTION, SOLUTION INTRAVENOUS at 08:04

## 2023-04-19 NOTE — PROGRESS NOTES
[facility]/Surg  [facility] Medicine  Progress Note    Patient Name: Lin Flowers  MRN: [id number]  Patient Class: IP- Hospice   Admission Date: 4/17/2023  Length of Stay: 2 days  Attending Physician: Judy Aguirre MD  Primary Care Provider: Nilton Ndiaye MD        Subjective:     Principal Problem:Anoxic brain injury        HPI:  86 yo who had cardiac arrest at home witnessed by family, was down for over 15 minutes prior to institution of CPR, was brought back and placed on vent, treated for sepsis and UTI with physical improvements, however her mental status has not improved at all she was extubated on 04/16/2023 per family agreement and has been with stable vitals and breathing but not responding to any stimuli.  She has been having twitching of face this am which is felt to be seixures.  We started her on some keppra iv.  She is getting morphine prn at this time.  [facility] and is admitting pt to [facility]IP services at this time.      Overview/Hospital Course:  04/18/2023 rest in bed no distress  04/19/2023 no change overnight       Past Medical History:   Diagnosis Date    Alzheimer's disease, unspecified (CODE)     Asthma     High cholesterol     Kidney stones        History reviewed. No pertinent surgical history.    Review of patient's allergies indicates:  No Known Allergies    No current facility-administered medications on file prior to encounter.     Current Outpatient Medications on File Prior to Encounter   Medication Sig    donepeziL (ARICEPT) 10 MG tablet Take 10 mg by mouth every evening.    montelukast (SINGULAIR) 10 mg tablet Take 10 mg by mouth once daily.    multivitamin capsule Take 1 capsule by mouth once daily.    pantoprazole (PROTONIX) 40 MG tablet Take 40 mg by mouth once daily.    SYMBICORT 160-4.5 mcg/actuation HFAA 2 puffs 2 (two) times daily.    vitamin E 100 UNIT capsule Take 100 Units by mouth once daily.     Family History    None        Tobacco Use    Smoking status: Never    Smokeless tobacco: Never   Substance and Sexual Activity    Alcohol use: Never    Drug use: Never    Sexual activity: Not on file     Review of Systems   Unable to perform ROS: Mental status change   Objective:     Vital Signs (Most Recent):  Temp: 97.5 °F (36.4 °C) (04/19/23 1058)  Pulse: 85 (04/19/23 1058)  Resp: (!) 22 (04/19/23 1316)  BP: 112/62 (04/19/23 1058)  SpO2: 95 % (04/19/23 1058) Vital Signs (24h Range):  Temp:  [97.1 °F (36.2 °C)-97.5 °F (36.4 °C)] 97.5 °F (36.4 °C)  Pulse:  [76-86] 85  Resp:  [18-24] 22  SpO2:  [94 %-96 %] 95 %  BP: (112-119)/(55-62) 112/62     Weight: 61.7 kg (136 lb)  Body mass index is 22.63 kg/m².    Physical Exam  Constitutional:       General: She is not in acute distress.     Appearance: She is not ill-appearing.      Comments: She is resting comfortably, occasional facial twitches thought to be seizures.  No meaningful responses, UE and LE are flaccid without any resistance.   Cardiovascular:      Rate and Rhythm: Normal rate and regular rhythm.      Pulses: Normal pulses.      Heart sounds: Normal heart sounds.   Pulmonary:      Comments: Breath sounds are equal and clear, shallow  Abdominal:      General: Bowel sounds are normal.      Palpations: Abdomen is soft.   Skin:     General: Skin is warm and dry.      Findings: No erythema, lesion or rash.   Neurological:      Comments: Does not respond to stimuli           Significant Labs: All pertinent labs within the past 24 hours have been reviewed.    Significant Imaging: I have reviewed all pertinent imaging results/findings within the past 24 hours.      Assessment/Plan:      * Anoxic brain injury  Pt admitted to hospice GIP    Cont comfort measures      Anoxic seizures    keppra iv  Morphine prn for pain      VTE Risk Mitigation (From admission, onward)    None          Discharge Planning   LANIE: 4/21/2023     Code Status: DNR   Is the patient medically ready for discharge?:      Reason for patient still in hospital (select all that apply): Patient trending condition, Laboratory test and Treatment                     Mo Fine MD  Department of Hospital Medicine   [facility]

## 2023-04-19 NOTE — SUBJECTIVE & OBJECTIVE
Past Medical History:   Diagnosis Date    Alzheimer's disease, unspecified (CODE)     Asthma     High cholesterol     Kidney stones        History reviewed. No pertinent surgical history.    Review of patient's allergies indicates:  No Known Allergies    No current facility-administered medications on file prior to encounter.     Current Outpatient Medications on File Prior to Encounter   Medication Sig    donepeziL (ARICEPT) 10 MG tablet Take 10 mg by mouth every evening.    montelukast (SINGULAIR) 10 mg tablet Take 10 mg by mouth once daily.    multivitamin capsule Take 1 capsule by mouth once daily.    pantoprazole (PROTONIX) 40 MG tablet Take 40 mg by mouth once daily.    SYMBICORT 160-4.5 mcg/actuation HFAA 2 puffs 2 (two) times daily.    vitamin E 100 UNIT capsule Take 100 Units by mouth once daily.     Family History    None       Tobacco Use    Smoking status: Never    Smokeless tobacco: Never   Substance and Sexual Activity    Alcohol use: Never    Drug use: Never    Sexual activity: Not on file     Review of Systems   Unable to perform ROS: Mental status change   Objective:     Vital Signs (Most Recent):  Temp: 97.5 °F (36.4 °C) (04/19/23 1058)  Pulse: 85 (04/19/23 1058)  Resp: (!) 22 (04/19/23 1316)  BP: 112/62 (04/19/23 1058)  SpO2: 95 % (04/19/23 1058) Vital Signs (24h Range):  Temp:  [97.1 °F (36.2 °C)-97.5 °F (36.4 °C)] 97.5 °F (36.4 °C)  Pulse:  [76-86] 85  Resp:  [18-24] 22  SpO2:  [94 %-96 %] 95 %  BP: (112-119)/(55-62) 112/62     Weight: 61.7 kg (136 lb)  Body mass index is 22.63 kg/m².    Physical Exam  Constitutional:       General: She is not in acute distress.     Appearance: She is not ill-appearing.      Comments: She is resting comfortably, occasional facial twitches thought to be seizures.  No meaningful responses, UE and LE are flaccid without any resistance.   Cardiovascular:      Rate and Rhythm: Normal rate and regular rhythm.      Pulses: Normal pulses.      Heart sounds: Normal heart  sounds.   Pulmonary:      Comments: Breath sounds are equal and clear, shallow  Abdominal:      General: Bowel sounds are normal.      Palpations: Abdomen is soft.   Skin:     General: Skin is warm and dry.      Findings: No erythema, lesion or rash.   Neurological:      Comments: Does not respond to stimuli           Significant Labs: All pertinent labs within the past 24 hours have been reviewed.    Significant Imaging: I have reviewed all pertinent imaging results/findings within the past 24 hours.

## 2023-04-20 PROCEDURE — 99900026 HC AIRWAY MAINTENANCE (STAT)

## 2023-04-20 PROCEDURE — 63600175 PHARM REV CODE 636 W HCPCS: Performed by: FAMILY MEDICINE

## 2023-04-20 PROCEDURE — 11000001 HC ACUTE MED/SURG PRIVATE ROOM

## 2023-04-20 PROCEDURE — 94761 N-INVAS EAR/PLS OXIMETRY MLT: CPT

## 2023-04-20 RX ADMIN — LEVETIRACETAM 250 MG: 100 INJECTION, SOLUTION INTRAVENOUS at 08:04

## 2023-04-20 NOTE — SUBJECTIVE & OBJECTIVE
Past Medical History:   Diagnosis Date    Alzheimer's disease, unspecified (CODE)     Alzheimer's disease, unspecified (CODE)     Asthma     High cholesterol     High cholesterol     Kidney stones        History reviewed. No pertinent surgical history.    Review of patient's allergies indicates:  No Known Allergies    No current facility-administered medications on file prior to encounter.     Current Outpatient Medications on File Prior to Encounter   Medication Sig    donepeziL (ARICEPT) 10 MG tablet Take 10 mg by mouth every evening.    montelukast (SINGULAIR) 10 mg tablet Take 10 mg by mouth once daily.    multivitamin capsule Take 1 capsule by mouth once daily.    pantoprazole (PROTONIX) 40 MG tablet Take 40 mg by mouth once daily.    SYMBICORT 160-4.5 mcg/actuation HFAA 2 puffs 2 (two) times daily.    vitamin E 100 UNIT capsule Take 100 Units by mouth once daily.     Family History    None       Tobacco Use    Smoking status: Never    Smokeless tobacco: Never   Substance and Sexual Activity    Alcohol use: Never    Drug use: Never    Sexual activity: Not on file     Review of Systems   Unable to perform ROS: Mental status change   Objective:     Vital Signs (Most Recent):  Temp: 96.5 °F (35.8 °C) (04/20/23 1100)  Pulse: 89 (04/20/23 1100)  Resp: 20 (04/20/23 1100)  BP: 130/66 (04/20/23 1100)  SpO2: 96 % (04/20/23 1100) Vital Signs (24h Range):  Temp:  [96.5 °F (35.8 °C)-97.6 °F (36.4 °C)] 96.5 °F (35.8 °C)  Pulse:  [89-97] 89  Resp:  [20-22] 20  SpO2:  [95 %-97 %] 96 %  BP: (115-143)/(60-76) 130/66     Weight: 61.7 kg (136 lb)  Body mass index is 22.63 kg/m².    Physical Exam  Constitutional:       General: She is not in acute distress.     Appearance: She is not ill-appearing.      Comments: She is resting comfortably, occasional facial twitches thought to be seizures.  No meaningful responses, UE and LE are flaccid without any resistance.   Cardiovascular:      Rate and Rhythm: Normal rate and regular  rhythm.      Pulses: Normal pulses.      Heart sounds: Normal heart sounds.   Pulmonary:      Comments: Breath sounds are equal and clear, shallow  Abdominal:      General: Bowel sounds are normal.      Palpations: Abdomen is soft.   Skin:     General: Skin is warm and dry.      Findings: No erythema, lesion or rash.   Neurological:      Comments: Does not respond to stimuli           Significant Labs: All pertinent labs within the past 24 hours have been reviewed.    Significant Imaging: I have reviewed all pertinent imaging results/findings within the past 24 hours.

## 2023-04-20 NOTE — PHYSICIAN QUERY
PT Name: Lin Flowers  MR #: [id number]     DOCUMENTATION CLARIFICATION      CDS/: Kavya Castellanos RN              Contact information:[email or web address]  This form is a permanent document in the medical record.    Query Date: April 20, 2023    By submitting this query, we are merely seeking further clarification of documentation to reflect the severity of illness of your patient. Please utilize your independent clinical judgment when addressing the question(s) below.     The Medical Record contains the following:   Indicators   Supporting Clinical Findings Location in Medical Record    Chest Pain, Angina      Coronary Artery Disease     x EKG Initial Reading: No STEMI. Previous EKG: Compared with most recent EKG Rhythm: Sinus Tachycardia. Heart Rate: 106. Ectopy: No Ectopy. Conduction: RBBB. Axis: Normal. Clinical Impression: Sinus Tachycardia with RBBB  ED MD   x Troponin Troponin=0.305->2.060->1.540->1.060->0.471 Lab 4-12 to 4-15    Echo Results      Angiography     x Documentation of acute cardiac condition NSTEMI/ is this considered a type 2   -  likely secondary to PE and CPR   Cardiology note 4-13    Medication/Treatment      Other        Provider, please clarify  the cardiac diagnosis related to the above documentation:  [  x ] NSTEMI   [   ] NSTEMI/Myocardial Infarction Type 2 due to PE   [   ] NSTEMI/Myocardial Infarction Type 2 due to please specify:______________   [   ] Other Cardiac Diagnosis (please specify): _______________         Please document in your progress notes daily for the duration of treatment until resolved, and include in your discharge summary.    Form No. [id number]

## 2023-04-20 NOTE — PROGRESS NOTES
[facility]/Surg  [facility] Medicine  Progress Note    Patient Name: Lin Flowers  MRN: [id number]  Patient Class: IP- Hospice   Admission Date: 4/17/2023  Length of Stay: 3 days  Attending Physician: Judy Aguirre MD  Primary Care Provider: Nilton Ndiaye MD        Subjective:     Principal Problem:Anoxic brain injury        HPI:  88 yo who had cardiac arrest at home witnessed by family, was down for over 15 minutes prior to institution of CPR, was brought back and placed on vent, treated for sepsis and UTI with physical improvements, however her mental status has not improved at all she was extubated on 04/16/2023 per family agreement and has been with stable vitals and breathing but not responding to any stimuli.  She has been having twitching of face this am which is felt to be seixures.  We started her on some keppra iv.  She is getting morphine prn at this time.  [facility] and is admitting pt to [facility]IP services at this time.      Overview/Hospital Course:  04/18/2023 rest in bed no distress  04/19/2023 no change overnight   04/20/2023 appears comfortable no distress      Past Medical History:   Diagnosis Date    Alzheimer's disease, unspecified (CODE)     Alzheimer's disease, unspecified (CODE)     Asthma     High cholesterol     High cholesterol     Kidney stones        History reviewed. No pertinent surgical history.    Review of patient's allergies indicates:  No Known Allergies    No current facility-administered medications on file prior to encounter.     Current Outpatient Medications on File Prior to Encounter   Medication Sig    donepeziL (ARICEPT) 10 MG tablet Take 10 mg by mouth every evening.    montelukast (SINGULAIR) 10 mg tablet Take 10 mg by mouth once daily.    multivitamin capsule Take 1 capsule by mouth once daily.    pantoprazole (PROTONIX) 40 MG tablet Take 40 mg by mouth once daily.    SYMBICORT 160-4.5 mcg/actuation HFAA 2 puffs 2  (two) times daily.    vitamin E 100 UNIT capsule Take 100 Units by mouth once daily.     Family History    None       Tobacco Use    Smoking status: Never    Smokeless tobacco: Never   Substance and Sexual Activity    Alcohol use: Never    Drug use: Never    Sexual activity: Not on file     Review of Systems   Unable to perform ROS: Mental status change   Objective:     Vital Signs (Most Recent):  Temp: 96.5 °F (35.8 °C) (04/20/23 1100)  Pulse: 89 (04/20/23 1100)  Resp: 20 (04/20/23 1100)  BP: 130/66 (04/20/23 1100)  SpO2: 96 % (04/20/23 1100) Vital Signs (24h Range):  Temp:  [96.5 °F (35.8 °C)-97.6 °F (36.4 °C)] 96.5 °F (35.8 °C)  Pulse:  [89-97] 89  Resp:  [20-22] 20  SpO2:  [95 %-97 %] 96 %  BP: (115-143)/(60-76) 130/66     Weight: 61.7 kg (136 lb)  Body mass index is 22.63 kg/m².    Physical Exam  Constitutional:       General: She is not in acute distress.     Appearance: She is not ill-appearing.      Comments: She is resting comfortably, occasional facial twitches thought to be seizures.  No meaningful responses, UE and LE are flaccid without any resistance.   Cardiovascular:      Rate and Rhythm: Normal rate and regular rhythm.      Pulses: Normal pulses.      Heart sounds: Normal heart sounds.   Pulmonary:      Comments: Breath sounds are equal and clear, shallow  Abdominal:      General: Bowel sounds are normal.      Palpations: Abdomen is soft.   Skin:     General: Skin is warm and dry.      Findings: No erythema, lesion or rash.   Neurological:      Comments: Does not respond to stimuli           Significant Labs: All pertinent labs within the past 24 hours have been reviewed.    Significant Imaging: I have reviewed all pertinent imaging results/findings within the past 24 hours.      Assessment/Plan:      * Anoxic brain injury  Pt admitted to hospice GIP    Cont comfort measures      Anoxic seizures    keppra iv  Morphine prn for pain      VTE Risk Mitigation (From admission, onward)    None           Discharge Planning   LANIE: 4/21/2023     Code Status: DNR   Is the patient medically ready for discharge?:     Reason for patient still in hospital (select all that apply): Patient trending condition and Treatment                     Mo Fine MD  Department of Hospital Medicine   [facility]/Surg

## 2023-04-20 NOTE — PHYSICIAN QUERY
PT Name: Lin Flowers  MR #: [id number]    DOCUMENTATION CLARIFICATION     CDS/: Kavya Castellanos RN              Contact information:[email or web address]    This form is a permanent document in the medical record.     Query Date: April 20, 2023    By submitting this query, we are merely seeking further clarification of documentation. Please utilize your independent clinical judgment when addressing the question(s) below.    Supporting Clinical Findings Location in Medical Record   This patient does have evidence of infective focus  My overall impression is septic shock due to lactate > 4, MAP < 65 and SBP < 90.  Source: Urinary Tract  UA shows Enterococcus  Change to iv levaquin   HM note 4-16   Sepsis/UTI    Cardiology note 4-13       Provider, please clarify if there is any clinical correlation between Sepsis and Enterococcus.           Are the conditions:      [x  ] Due to or associated with each other   [  ] Unrelated to each other   [  ] Other explanation (Please Specify): ______________                                                                             Please document in your progress notes daily for the duration of treatment until resolved and include in your discharge summary.

## 2023-04-20 NOTE — PLAN OF CARE
Problem: Adult Inpatient Plan of Care  Goal: Plan of Care Review  Outcome: Ongoing, Progressing  Goal: Patient-Specific Goal (Individualized)  Outcome: Ongoing, Progressing  Goal: Absence of Hospital-Acquired Illness or Injury  Outcome: Ongoing, Progressing  Goal: Optimal Comfort and Wellbeing  Outcome: Ongoing, Progressing  Goal: Readiness for Transition of Care  Outcome: Ongoing, Progressing     Problem: Adjustment to Illness (Sepsis/Septic Shock)  Goal: Optimal Coping  Outcome: Ongoing, Progressing     Problem: Bleeding (Sepsis/Septic Shock)  Goal: Absence of Bleeding  Outcome: Ongoing, Progressing     Problem: Glycemic Control Impaired (Sepsis/Septic Shock)  Goal: Blood Glucose Level Within Desired Range  Outcome: Ongoing, Progressing     Problem: Infection Progression (Sepsis/Septic Shock)  Goal: Absence of Infection Signs and Symptoms  Outcome: Ongoing, Progressing     Problem: Nutrition Impaired (Sepsis/Septic Shock)  Goal: Optimal Nutrition Intake  Outcome: Ongoing, Progressing     Problem: Infection  Goal: Absence of Infection Signs and Symptoms  Outcome: Ongoing, Progressing     Problem: Fall Injury Risk  Goal: Absence of Fall and Fall-Related Injury  Outcome: Ongoing, Progressing     Problem: Skin Injury Risk Increased  Goal: Skin Health and Integrity  Outcome: Ongoing, Progressing     Problem: Impaired Wound Healing  Goal: Optimal Wound Healing  Outcome: Ongoing, Progressing

## 2023-04-21 PROCEDURE — 25000003 PHARM REV CODE 250: Performed by: FAMILY MEDICINE

## 2023-04-21 PROCEDURE — 63600175 PHARM REV CODE 636 W HCPCS: Performed by: FAMILY MEDICINE

## 2023-04-21 PROCEDURE — 99900035 HC TECH TIME PER 15 MIN (STAT)

## 2023-04-21 PROCEDURE — 94761 N-INVAS EAR/PLS OXIMETRY MLT: CPT

## 2023-04-21 PROCEDURE — 99900026 HC AIRWAY MAINTENANCE (STAT)

## 2023-04-21 PROCEDURE — A4216 STERILE WATER/SALINE, 10 ML: HCPCS | Performed by: FAMILY MEDICINE

## 2023-04-21 PROCEDURE — 11000001 HC ACUTE MED/SURG PRIVATE ROOM

## 2023-04-21 RX ORDER — SCOLOPAMINE TRANSDERMAL SYSTEM 1 MG/1
1 PATCH, EXTENDED RELEASE TRANSDERMAL
Status: DISCONTINUED | OUTPATIENT
Start: 2023-04-21 | End: 2023-04-24 | Stop reason: HOSPADM

## 2023-04-21 RX ADMIN — SCOPALAMINE 1 PATCH: 1 PATCH, EXTENDED RELEASE TRANSDERMAL at 12:04

## 2023-04-21 RX ADMIN — Medication 10 ML: at 09:04

## 2023-04-21 RX ADMIN — MORPHINE SULFATE 2 MG: 2 INJECTION, SOLUTION INTRAMUSCULAR; INTRAVENOUS at 02:04

## 2023-04-21 RX ADMIN — LEVETIRACETAM 250 MG: 100 INJECTION, SOLUTION INTRAVENOUS at 09:04

## 2023-04-21 RX ADMIN — GLYCOPYRROLATE 0.3 MG: 0.2 INJECTION INTRAMUSCULAR; INTRAVENOUS at 02:04

## 2023-04-21 RX ADMIN — LEVETIRACETAM 250 MG: 100 INJECTION, SOLUTION INTRAVENOUS at 08:04

## 2023-04-21 NOTE — NURSING
O2 sat decreased down to 87% sustained on room with good waveform on pulse ox monitor;noted that her breath sounds were quite wet sounding audible without a stethoscope & also she was working a little harder to breathe;called for r.t. for oral suctioning;Robinul & Morphine given & will continue to monitor

## 2023-04-21 NOTE — PLAN OF CARE
Problem: Adult Inpatient Plan of Care  Goal: Plan of Care Review  Outcome: Ongoing, Progressing  Goal: Patient-Specific Goal (Individualized)  Outcome: Ongoing, Progressing  Goal: Absence of Hospital-Acquired Illness or Injury  Outcome: Ongoing, Progressing  Goal: Optimal Comfort and Wellbeing  Outcome: Ongoing, Progressing  Goal: Readiness for Transition of Care  Outcome: Ongoing, Progressing     Problem: Nutrition Impaired (Sepsis/Septic Shock)  Goal: Optimal Nutrition Intake  Outcome: Ongoing, Progressing     Problem: Fall Injury Risk  Goal: Absence of Fall and Fall-Related Injury  Outcome: Ongoing, Progressing     Problem: Skin Injury Risk Increased  Goal: Skin Health and Integrity  Outcome: Ongoing, Progressing     Problem: Impaired Wound Healing  Goal: Optimal Wound Healing  Outcome: Ongoing, Progressing

## 2023-04-21 NOTE — PROGRESS NOTES
[facility]-Med/Surg  Wound Care    Patient Name:  Lin Flowers   MRN:  [id number]  Date: 4/21/2023  Diagnosis: Anoxic brain injury    History:     Past Medical History:   Diagnosis Date    Alzheimer's disease, unspecified (CODE)     Alzheimer's disease, unspecified (CODE)     Asthma     High cholesterol     High cholesterol     Kidney stones        Social History     Socioeconomic History    Marital status:    Tobacco Use    Smoking status: Never    Smokeless tobacco: Never   Substance and Sexual Activity    Alcohol use: Never    Drug use: Never     Social Determinants of Health     Financial Resource Strain: Low Risk     Difficulty of Paying Living Expenses: Not hard at all   Food Insecurity: No Food Insecurity    Worried About Running Out of Food in the Last Year: Never true    Ran Out of Food in the Last Year: Never true   Transportation Needs: No Transportation Needs    Lack of Transportation (Medical): No    Lack of Transportation (Non-Medical): No   Physical Activity: Inactive    Days of Exercise per Week: 0 days    Minutes of Exercise per Session: 0 min   Stress: No Stress Concern Present    Feeling of Stress : Only a little   Social Connections: Moderately Isolated    Frequency of Communication with Friends and Family: Twice a week    Frequency of Social Gatherings with Friends and Family: More than three times a week    Attends [employer] Services: More than 4 times per year    Active Member of Clubs or Organizations: No    Attends Club or Organization Meetings: Never    Marital Status:    Housing Stability: Low Risk     Unable to Pay for Housing in the Last Year: No    Number of Places Lived in the Last Year: 1    Unstable Housing in the Last Year: No       Precautions:     Allergies as of 04/17/2023    (No Known Allergies)       WOC Assessment Details/Treatment            04/21/23 1026 04/21/23 1028   WOCN Assessment   WOCN Total Time (mins) 30  --    Visit Date 04/21/23  --     Visit Time 0955  --    Consult Type New  --    WOCN Speciality Wound  --    Wound other  (skin failure at end of life)  --    Number of Wounds 7  --    Intervention assessed;applied  --    Teaching on-going  (family.)  --    Skin Interventions   Pressure Reduction Devices pressure-redistributing mattress utilized;heel offloading device utilized  --    Pressure Reduction Techniques weight shift assistance provided;positioned off wounds  --    Positioning   Body Position right;turned  --    Positioning/Transfer Devices pillows  --    Pressure Injury Prevention    Check Moisture Management Pad Done  --    Heel protection technique Heel boot  --         Altered Skin Integrity 04/19/23 1910 Right medial Foot #2 Skin changes at life end (scale)   Date First Assessed/Time First Assessed: 04/19/23 1910   Altered Skin Integrity Present on Admission - Did Patient arrive to the hospital with altered skin?: (c)   Side: Right  Orientation: medial  Location: (c) Foot  Wound Number: #2  Primary Wound T...   Wound Image    --    Dressing Appearance Open to air  --    Drainage Amount None  --    Drainage Characteristics/Odor No odor  --    Appearance Intact;Purple  --    Wound Edges Undefined  --    Wound Length (cm) 2.8 cm  --    Wound Width (cm) 2 cm  --    Wound Depth (cm) 0 cm  --    Wound Volume (cm^3) 0 cm^3  --    Wound Surface Area (cm^2) 5.6 cm^2  --    Care Applied:;Skin Barrier  --    Dressing   (left open to air)  --         Altered Skin Integrity 04/19/23 1910 Left lateral Foot #3 Skin changes at life end (scale)   Date First Assessed/Time First Assessed: 04/19/23 1910   Altered Skin Integrity Present on Admission - Did Patient arrive to the hospital with altered skin?: (c)   Side: Left  Orientation: lateral  Location: Foot  Wound Number: #3  Is this injury keith...   Wound Image   --    Dressing Appearance Open to air Open to air   Drainage Amount None None   Drainage Characteristics/Odor No odor No odor   Appearance  Intact;Purple Intact;Purple   Wound Edges Undefined Undefined   Wound Length (cm) 0.7 cm  (distal) 1.5 cm  (proximal)   Wound Width (cm) 0.6 cm 1 cm   Wound Depth (cm) 0 cm 0 cm   Wound Volume (cm^3) 0 cm^3 0 cm^3   Wound Surface Area (cm^2) 0.42 cm^2 1.5 cm^2   Care Applied:;Skin Barrier Applied:;Skin Barrier   Dressing   (left open to air)   (left open to air)        Altered Skin Integrity 04/21/23 1015 Right lateral Foot Skin changes at life end (scale)   Date First Assessed/Time First Assessed: 04/21/23 1015   Side: Right  Orientation: lateral  Location: Foot  Primary Wound Type: Skin changes at life end (scale)   Wound Image     Dressing Appearance Open to air Open to air   Drainage Amount None None   Appearance Intact;Purple Intact;Purple   Wound Edges Undefined Undefined   Wound Length (cm) 1.2 cm  (distal) 1.8 cm  (proximal)   Wound Width (cm) 0.5 cm 0.8 cm   Wound Depth (cm) 0 cm 0 cm   Wound Volume (cm^3) 0 cm^3 0 cm^3   Wound Surface Area (cm^2) 0.6 cm^2 1.44 cm^2   Care Applied:;Skin Barrier Applied:;Skin Barrier   Dressing   (left open to air)   (left open to air)        Altered Skin Integrity 04/21/23 1014 Left lateral Heel Skin changes at life end (scale)   Date First Assessed/Time First Assessed: 04/21/23 1014   Side: Left  Orientation: lateral  Location: Heel  Primary Wound Type: Skin changes at life end (scale)   Wound Image   --    Dressing Appearance Open to air  --    Drainage Amount None  --    Appearance Intact;Purple  --    Wound Edges Undefined  --    Wound Length (cm) 1.5 cm  --    Wound Width (cm) 1.7 cm  --    Wound Depth (cm) 0 cm  --    Wound Volume (cm^3) 0 cm^3  --    Wound Surface Area (cm^2) 2.55 cm^2  --    Care Applied:;Skin Barrier  --    Dressing   (left open to air)  --         Altered Skin Integrity 04/21/23 1010 Sacral spine Skin changes at life end (scale)   Date First Assessed/Time First Assessed: 04/21/23 1010   Location: Sacral spine  Primary Wound Type: Skin changes at  life end (scale)   Wound Image    --    Dressing Appearance Open to air  --    Drainage Amount None  --    Appearance Intact;Purple;Moist;Red  (Majority of injury is intact and purple.  Small area noted to be red and purple and opened and moist.)  --    Red (%), Wound Tissue Color 100 %  --    Wound Edges Defined  --    Wound Length (cm) 5.4 cm  --    Wound Width (cm) 7.5 cm  --    Wound Depth (cm) 0.1 cm  --    Wound Volume (cm^3) 4.05 cm^3  --    Wound Surface Area (cm^2) 40.5 cm^2  --    Care Cleansed with:;Sterile normal saline;Applied:;Skin Barrier  --    Dressing Applied;Foam  --    Dressing Change Due 04/26/23  --         Orders placed.  Patient is inpatient hospice.  Bilateral feet are in heel elevation boots.  Discussed findings with Dr. Fine.  Identified all injuries as skin changes at life's end.      04/21/2023

## 2023-04-21 NOTE — PROGRESS NOTES
[facility]/Surg  [facility] Medicine  Progress Note    Patient Name: Lin Flowers  MRN: [id number]  Patient Class: IP- Hospice   Admission Date: 4/17/2023  Length of Stay: 4 days  Attending Physician: Judy Aguirre MD  Primary Care Provider: Nilton Ndiaye MD        Subjective:     Principal Problem:Anoxic brain injury        HPI:  88 yo who had cardiac arrest at home witnessed by family, was down for over 15 minutes prior to institution of CPR, was brought back and placed on vent, treated for sepsis and UTI with physical improvements, however her mental status has not improved at all she was extubated on 04/16/2023 per family agreement and has been with stable vitals and breathing but not responding to any stimuli.  She has been having twitching of face this am which is felt to be seixures.  We started her on some keppra iv.  She is getting morphine prn at this time.  [facility] and is admitting pt to [facility]IP services at this time.      Overview/Hospital Course:  04/18/2023 rest in bed no distress  04/19/2023 no change overnight   04/20/2023 appears comfortable no distress  04/21/2023 dose morphine uncomfortable overnight       Past Medical History:   Diagnosis Date    Alzheimer's disease, unspecified (CODE)     Alzheimer's disease, unspecified (CODE)     Asthma     High cholesterol     High cholesterol     Kidney stones        History reviewed. No pertinent surgical history.    Review of patient's allergies indicates:  No Known Allergies    No current facility-administered medications on file prior to encounter.     Current Outpatient Medications on File Prior to Encounter   Medication Sig    donepeziL (ARICEPT) 10 MG tablet Take 10 mg by mouth every evening.    montelukast (SINGULAIR) 10 mg tablet Take 10 mg by mouth once daily.    multivitamin capsule Take 1 capsule by mouth once daily.    pantoprazole (PROTONIX) 40 MG tablet Take 40 mg by mouth once daily.     SYMBICORT 160-4.5 mcg/actuation HFAA 2 puffs 2 (two) times daily.    vitamin E 100 UNIT capsule Take 100 Units by mouth once daily.     Family History    None       Tobacco Use    Smoking status: Never    Smokeless tobacco: Never   Substance and Sexual Activity    Alcohol use: Never    Drug use: Never    Sexual activity: Not on file     Review of Systems   Unable to perform ROS: Mental status change   Objective:     Vital Signs (Most Recent):  Temp: 97.1 °F (36.2 °C) (04/21/23 1117)  Pulse: 93 (04/21/23 1117)  Resp: 18 (04/21/23 1117)  BP: (!) 149/73 (04/21/23 1117)  SpO2: (!) 94 % (04/21/23 1117) Vital Signs (24h Range):  Temp:  [96.7 °F (35.9 °C)-97.7 °F (36.5 °C)] 97.1 °F (36.2 °C)  Pulse:  [] 93  Resp:  [18-24] 18  SpO2:  [94 %-96 %] 94 %  BP: (144-157)/(73-95) 149/73     Weight: 61.7 kg (136 lb)  Body mass index is 22.63 kg/m².    Physical Exam  Constitutional:       General: She is not in acute distress.     Appearance: She is not ill-appearing.      Comments: She is resting comfortably, occasional facial twitches thought to be seizures.  No meaningful responses, UE and LE are flaccid without any resistance.   Cardiovascular:      Rate and Rhythm: Normal rate and regular rhythm.      Pulses: Normal pulses.      Heart sounds: Normal heart sounds.   Pulmonary:      Comments: Breath sounds are equal and clear, shallow  Abdominal:      General: Bowel sounds are normal.      Palpations: Abdomen is soft.   Skin:     General: Skin is warm and dry.      Findings: No erythema, lesion or rash.   Neurological:      Comments: Does not respond to stimuli           Significant Labs: All pertinent labs within the past 24 hours have been reviewed.    Significant Imaging: I have reviewed all pertinent imaging results/findings within the past 24 hours.      Assessment/Plan:      * Anoxic brain injury  Pt admitted to hospice GIP    Cont comfort measures      Anoxic seizures    keppra iv  Morphine prn for  pain      VTE Risk Mitigation (From admission, onward)    None          Discharge Planning   LANIE: 4/25/2023     Code Status: DNR   Is the patient medically ready for discharge?:     Reason for patient still in hospital (select all that apply): Patient trending condition, Laboratory test and Treatment                     Mo Fine MD  Department of Hospital Medicine   [facility]/Surg

## 2023-04-21 NOTE — NURSING
R.T. at bedside doing oral suctioning;patient resists at times & attempts to move arms & does make slight movements with them in attempts to resist;o2 sat=97% after suctioning

## 2023-04-21 NOTE — SUBJECTIVE & OBJECTIVE
Past Medical History:   Diagnosis Date    Alzheimer's disease, unspecified (CODE)     Alzheimer's disease, unspecified (CODE)     Asthma     High cholesterol     High cholesterol     Kidney stones        History reviewed. No pertinent surgical history.    Review of patient's allergies indicates:  No Known Allergies    No current facility-administered medications on file prior to encounter.     Current Outpatient Medications on File Prior to Encounter   Medication Sig    donepeziL (ARICEPT) 10 MG tablet Take 10 mg by mouth every evening.    montelukast (SINGULAIR) 10 mg tablet Take 10 mg by mouth once daily.    multivitamin capsule Take 1 capsule by mouth once daily.    pantoprazole (PROTONIX) 40 MG tablet Take 40 mg by mouth once daily.    SYMBICORT 160-4.5 mcg/actuation HFAA 2 puffs 2 (two) times daily.    vitamin E 100 UNIT capsule Take 100 Units by mouth once daily.     Family History    None       Tobacco Use    Smoking status: Never    Smokeless tobacco: Never   Substance and Sexual Activity    Alcohol use: Never    Drug use: Never    Sexual activity: Not on file     Review of Systems   Unable to perform ROS: Mental status change   Objective:     Vital Signs (Most Recent):  Temp: 97.1 °F (36.2 °C) (04/21/23 1117)  Pulse: 93 (04/21/23 1117)  Resp: 18 (04/21/23 1117)  BP: (!) 149/73 (04/21/23 1117)  SpO2: (!) 94 % (04/21/23 1117) Vital Signs (24h Range):  Temp:  [96.7 °F (35.9 °C)-97.7 °F (36.5 °C)] 97.1 °F (36.2 °C)  Pulse:  [] 93  Resp:  [18-24] 18  SpO2:  [94 %-96 %] 94 %  BP: (144-157)/(73-95) 149/73     Weight: 61.7 kg (136 lb)  Body mass index is 22.63 kg/m².    Physical Exam  Constitutional:       General: She is not in acute distress.     Appearance: She is not ill-appearing.      Comments: She is resting comfortably, occasional facial twitches thought to be seizures.  No meaningful responses, UE and LE are flaccid without any resistance.   Cardiovascular:      Rate and Rhythm: Normal rate and  regular rhythm.      Pulses: Normal pulses.      Heart sounds: Normal heart sounds.   Pulmonary:      Comments: Breath sounds are equal and clear, shallow  Abdominal:      General: Bowel sounds are normal.      Palpations: Abdomen is soft.   Skin:     General: Skin is warm and dry.      Findings: No erythema, lesion or rash.   Neurological:      Comments: Does not respond to stimuli           Significant Labs: All pertinent labs within the past 24 hours have been reviewed.    Significant Imaging: I have reviewed all pertinent imaging results/findings within the past 24 hours.

## 2023-04-22 PROCEDURE — 99900026 HC AIRWAY MAINTENANCE (STAT)

## 2023-04-22 PROCEDURE — 25000003 PHARM REV CODE 250: Performed by: FAMILY MEDICINE

## 2023-04-22 PROCEDURE — 11000001 HC ACUTE MED/SURG PRIVATE ROOM

## 2023-04-22 PROCEDURE — 94761 N-INVAS EAR/PLS OXIMETRY MLT: CPT

## 2023-04-22 PROCEDURE — 63600175 PHARM REV CODE 636 W HCPCS: Performed by: FAMILY MEDICINE

## 2023-04-22 PROCEDURE — 31720 CLEARANCE OF AIRWAYS: CPT

## 2023-04-22 RX ADMIN — LEVETIRACETAM 250 MG: 100 INJECTION, SOLUTION INTRAVENOUS at 09:04

## 2023-04-22 RX ADMIN — LEVETIRACETAM 250 MG: 100 INJECTION, SOLUTION INTRAVENOUS at 10:04

## 2023-04-22 RX ADMIN — GLYCOPYRROLATE 0.3 MG: 0.2 INJECTION INTRAMUSCULAR; INTRAVENOUS at 11:04

## 2023-04-22 RX ADMIN — MORPHINE SULFATE 2 MG: 2 INJECTION, SOLUTION INTRAMUSCULAR; INTRAVENOUS at 11:04

## 2023-04-22 NOTE — PLAN OF CARE
Problem: Adult Inpatient Plan of Care  Goal: Plan of Care Review  Outcome: Ongoing, Progressing  Goal: Patient-Specific Goal (Individualized)  Outcome: Ongoing, Progressing  Goal: Absence of Hospital-Acquired Illness or Injury  Outcome: Ongoing, Progressing  Goal: Optimal Comfort and Wellbeing  Outcome: Ongoing, Progressing  Goal: Readiness for Transition of Care  Outcome: Ongoing, Progressing     Problem: Nutrition Impaired (Sepsis/Septic Shock)  Goal: Optimal Nutrition Intake  Description: IP hospice patient  Outcome: Ongoing, Progressing     Problem: Fall Injury Risk  Goal: Absence of Fall and Fall-Related Injury  Outcome: Ongoing, Progressing     Problem: Skin Injury Risk Increased  Goal: Skin Health and Integrity  Outcome: Ongoing, Progressing     Problem: Impaired Wound Healing  Goal: Optimal Wound Healing  Outcome: Ongoing, Progressing

## 2023-04-22 NOTE — NURSING
Called resp to suction patient per family req. Bp elevated. Patient appears to be in discomfort. 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 Will cont to monitor.

## 2023-04-22 NOTE — SUBJECTIVE & OBJECTIVE
Past Medical History:   Diagnosis Date    Alzheimer's disease, unspecified (CODE)     Alzheimer's disease, unspecified (CODE)     Asthma     High cholesterol     High cholesterol     Kidney stones        History reviewed. No pertinent surgical history.    Review of patient's allergies indicates:  No Known Allergies    No current facility-administered medications on file prior to encounter.     Current Outpatient Medications on File Prior to Encounter   Medication Sig    donepeziL (ARICEPT) 10 MG tablet Take 10 mg by mouth every evening.    montelukast (SINGULAIR) 10 mg tablet Take 10 mg by mouth once daily.    multivitamin capsule Take 1 capsule by mouth once daily.    pantoprazole (PROTONIX) 40 MG tablet Take 40 mg by mouth once daily.    SYMBICORT 160-4.5 mcg/actuation HFAA 2 puffs 2 (two) times daily.    vitamin E 100 UNIT capsule Take 100 Units by mouth once daily.     Family History    None       Tobacco Use    Smoking status: Never    Smokeless tobacco: Never   Substance and Sexual Activity    Alcohol use: Never    Drug use: Never    Sexual activity: Not on file     Review of Systems   Unable to perform ROS: Mental status change   Objective:     Vital Signs (Most Recent):  Temp: 96.4 °F (35.8 °C) (04/22/23 1101)  Pulse: 94 (04/22/23 1146)  Resp: 19 (04/22/23 1146)  BP: 130/68 (04/22/23 1146)  SpO2: 96 % (04/22/23 1101) Vital Signs (24h Range):  Temp:  [96.4 °F (35.8 °C)-97.4 °F (36.3 °C)] 96.4 °F (35.8 °C)  Pulse:  [] 94  Resp:  [18-20] 19  SpO2:  [95 %-97 %] 96 %  BP: (130-198)/() 130/68     Weight: 61.7 kg (136 lb)  Body mass index is 22.63 kg/m².    Physical Exam  Constitutional:       General: She is not in acute distress.     Appearance: She is not ill-appearing.      Comments: She is resting comfortably, occasional facial twitches thought to be seizures.  No meaningful responses, UE and LE are flaccid without any resistance.   Cardiovascular:      Rate and Rhythm: Normal rate and regular  rhythm.      Pulses: Normal pulses.      Heart sounds: Normal heart sounds.   Pulmonary:      Comments: Breath sounds are equal and clear, shallow  Abdominal:      General: Bowel sounds are normal.      Palpations: Abdomen is soft.   Skin:     General: Skin is warm and dry.      Findings: No erythema, lesion or rash.   Neurological:      Comments: Does not respond to stimuli           Significant Labs: All pertinent labs within the past 24 hours have been reviewed.    Significant Imaging: I have reviewed all pertinent imaging results/findings within the past 24 hours.

## 2023-04-22 NOTE — PROGRESS NOTES
[facility]/Surg  [facility] Medicine  Progress Note    Patient Name: Lin Flowers  MRN: [id number]  Patient Class: IP- Hospice   Admission Date: 4/17/2023  Length of Stay: 5 days  Attending Physician: Judy Aguirre MD  Primary Care Provider: Nilton Ndiaye MD        Subjective:     Principal Problem:Anoxic brain injury        HPI:  86 yo who had cardiac arrest at home witnessed by family, was down for over 15 minutes prior to institution of CPR, was brought back and placed on vent, treated for sepsis and UTI with physical improvements, however her mental status has not improved at all she was extubated on 04/16/2023 per family agreement and has been with stable vitals and breathing but not responding to any stimuli.  She has been having twitching of face this am which is felt to be seixures.  We started her on some keppra iv.  She is getting morphine prn at this time.  [facility] and is admitting pt to [facility] services at this time.      Overview/Hospital Course:  04/18/2023 rest in bed no distress  04/19/2023 no change overnight   04/20/2023 appears comfortable no distress  04/21/2023 dose morphine uncomfortable overnight   04/22/2023 no major change overnight       Past Medical History:   Diagnosis Date    Alzheimer's disease, unspecified (CODE)     Alzheimer's disease, unspecified (CODE)     Asthma     High cholesterol     High cholesterol     Kidney stones        History reviewed. No pertinent surgical history.    Review of patient's allergies indicates:  No Known Allergies    No current facility-administered medications on file prior to encounter.     Current Outpatient Medications on File Prior to Encounter   Medication Sig    donepeziL (ARICEPT) 10 MG tablet Take 10 mg by mouth every evening.    montelukast (SINGULAIR) 10 mg tablet Take 10 mg by mouth once daily.    multivitamin capsule Take 1 capsule by mouth once daily.    pantoprazole (PROTONIX) 40 MG  tablet Take 40 mg by mouth once daily.    SYMBICORT 160-4.5 mcg/actuation HFAA 2 puffs 2 (two) times daily.    vitamin E 100 UNIT capsule Take 100 Units by mouth once daily.     Family History    None       Tobacco Use    Smoking status: Never    Smokeless tobacco: Never   Substance and Sexual Activity    Alcohol use: Never    Drug use: Never    Sexual activity: Not on file     Review of Systems   Unable to perform ROS: Mental status change   Objective:     Vital Signs (Most Recent):  Temp: 96.4 °F (35.8 °C) (04/22/23 1101)  Pulse: 94 (04/22/23 1146)  Resp: 19 (04/22/23 1146)  BP: 130/68 (04/22/23 1146)  SpO2: 96 % (04/22/23 1101) Vital Signs (24h Range):  Temp:  [96.4 °F (35.8 °C)-97.4 °F (36.3 °C)] 96.4 °F (35.8 °C)  Pulse:  [] 94  Resp:  [18-20] 19  SpO2:  [95 %-97 %] 96 %  BP: (130-198)/() 130/68     Weight: 61.7 kg (136 lb)  Body mass index is 22.63 kg/m².    Physical Exam  Constitutional:       General: She is not in acute distress.     Appearance: She is not ill-appearing.      Comments: She is resting comfortably, occasional facial twitches thought to be seizures.  No meaningful responses, UE and LE are flaccid without any resistance.   Cardiovascular:      Rate and Rhythm: Normal rate and regular rhythm.      Pulses: Normal pulses.      Heart sounds: Normal heart sounds.   Pulmonary:      Comments: Breath sounds are equal and clear, shallow  Abdominal:      General: Bowel sounds are normal.      Palpations: Abdomen is soft.   Skin:     General: Skin is warm and dry.      Findings: No erythema, lesion or rash.   Neurological:      Comments: Does not respond to stimuli           Significant Labs: All pertinent labs within the past 24 hours have been reviewed.    Significant Imaging: I have reviewed all pertinent imaging results/findings within the past 24 hours.      Assessment/Plan:      * Anoxic brain injury  Pt admitted to hospice GIP    Cont comfort measures      Anoxic  seizures    keppra iv  Morphine prn for pain      VTE Risk Mitigation (From admission, onward)    None          Discharge Planning   LANIE: 4/24/2023     Code Status: DNR   Is the patient medically ready for discharge?:     Reason for patient still in hospital (select all that apply): Treatment                     Mo Fine MD  Department of Hospital Medicine   [facility]/Surg

## 2023-04-23 PROCEDURE — 11000001 HC ACUTE MED/SURG PRIVATE ROOM

## 2023-04-23 PROCEDURE — 99900026 HC AIRWAY MAINTENANCE (STAT)

## 2023-04-23 PROCEDURE — 31720 CLEARANCE OF AIRWAYS: CPT

## 2023-04-23 PROCEDURE — 94761 N-INVAS EAR/PLS OXIMETRY MLT: CPT

## 2023-04-23 PROCEDURE — 63600175 PHARM REV CODE 636 W HCPCS: Performed by: FAMILY MEDICINE

## 2023-04-23 PROCEDURE — 99900035 HC TECH TIME PER 15 MIN (STAT)

## 2023-04-23 RX ORDER — ACETAMINOPHEN 650 MG/1
650 SUPPOSITORY RECTAL EVERY 4 HOURS PRN
Status: DISCONTINUED | OUTPATIENT
Start: 2023-04-23 | End: 2023-04-24 | Stop reason: HOSPADM

## 2023-04-23 RX ADMIN — LEVETIRACETAM 250 MG: 100 INJECTION, SOLUTION INTRAVENOUS at 08:04

## 2023-04-23 RX ADMIN — LEVETIRACETAM 250 MG: 100 INJECTION, SOLUTION INTRAVENOUS at 10:04

## 2023-04-23 NOTE — SUBJECTIVE & OBJECTIVE
Past Medical History:   Diagnosis Date    Alzheimer's disease, unspecified (CODE)     Alzheimer's disease, unspecified (CODE)     Asthma     High cholesterol     High cholesterol     Kidney stones        History reviewed. No pertinent surgical history.    Review of patient's allergies indicates:  No Known Allergies    No current facility-administered medications on file prior to encounter.     Current Outpatient Medications on File Prior to Encounter   Medication Sig    donepeziL (ARICEPT) 10 MG tablet Take 10 mg by mouth every evening.    montelukast (SINGULAIR) 10 mg tablet Take 10 mg by mouth once daily.    multivitamin capsule Take 1 capsule by mouth once daily.    pantoprazole (PROTONIX) 40 MG tablet Take 40 mg by mouth once daily.    SYMBICORT 160-4.5 mcg/actuation HFAA 2 puffs 2 (two) times daily.    vitamin E 100 UNIT capsule Take 100 Units by mouth once daily.     Family History    None       Tobacco Use    Smoking status: Never    Smokeless tobacco: Never   Substance and Sexual Activity    Alcohol use: Never    Drug use: Never    Sexual activity: Not on file     Review of Systems   Unable to perform ROS: Mental status change   Objective:     Vital Signs (Most Recent):  Temp: 96.8 °F (36 °C) (04/23/23 1052)  Pulse: 89 (04/23/23 1052)  Resp: 20 (04/23/23 1052)  BP: (!) 177/85 (04/23/23 1052)  SpO2: 95 % (04/23/23 1052) Vital Signs (24h Range):  Temp:  [96 °F (35.6 °C)-97.4 °F (36.3 °C)] 96.8 °F (36 °C)  Pulse:  [80-90] 89  Resp:  [16-24] 20  SpO2:  [95 %-97 %] 95 %  BP: (151-177)/(80-85) 177/85     Weight: 61.7 kg (136 lb)  Body mass index is 22.63 kg/m².    Physical Exam  Constitutional:       General: She is not in acute distress.     Appearance: She is not ill-appearing.      Comments: She is resting comfortably, occasional facial twitches thought to be seizures.  No meaningful responses, UE and LE are flaccid without any resistance.   Cardiovascular:      Rate and Rhythm: Normal rate and regular rhythm.       Pulses: Normal pulses.      Heart sounds: Normal heart sounds.   Pulmonary:      Comments: Breath sounds are equal and clear, shallow  Abdominal:      General: Bowel sounds are normal.      Palpations: Abdomen is soft.   Skin:     General: Skin is warm and dry.      Findings: No erythema, lesion or rash.   Neurological:      Comments: Does not respond to stimuli           Significant Labs: All pertinent labs within the past 24 hours have been reviewed.    Significant Imaging: I have reviewed all pertinent imaging results/findings within the past 24 hours.

## 2023-04-23 NOTE — PROGRESS NOTES
[facility]/Surg  [facility] Medicine  Progress Note    Patient Name: Lin Flowers  MRN: [id number]  Patient Class: IP- Hospice   Admission Date: 4/17/2023  Length of Stay: 6 days  Attending Physician: Judy Aguirre MD  Primary Care Provider: Nilton Ndiaye MD        Subjective:     Principal Problem:Anoxic brain injury        HPI:  86 yo who had cardiac arrest at home witnessed by family, was down for over 15 minutes prior to institution of CPR, was brought back and placed on vent, treated for sepsis and UTI with physical improvements, however her mental status has not improved at all she was extubated on 04/16/2023 per family agreement and has been with stable vitals and breathing but not responding to any stimuli.  She has been having twitching of face this am which is felt to be seixures.  We started her on some keppra iv.  She is getting morphine prn at this time.  [facility] was [facility] and is admitting pt to [facility] services at this time.      Overview/Hospital Course:  04/18/2023 rest in bed no distress  04/19/2023 no change overnight   04/20/2023 appears comfortable no distress  04/21/2023 dose morphine uncomfortable overnight   04/22/2023 no major change overnight   04/23/2023 no changes overnight       Past Medical History:   Diagnosis Date    Alzheimer's disease, unspecified (CODE)     Alzheimer's disease, unspecified (CODE)     Asthma     High cholesterol     High cholesterol     Kidney stones        History reviewed. No pertinent surgical history.    Review of patient's allergies indicates:  No Known Allergies    No current facility-administered medications on file prior to encounter.     Current Outpatient Medications on File Prior to Encounter   Medication Sig    donepeziL (ARICEPT) 10 MG tablet Take 10 mg by mouth every evening.    montelukast (SINGULAIR) 10 mg tablet Take 10 mg by mouth once daily.    multivitamin capsule Take 1 capsule by mouth once daily.     pantoprazole (PROTONIX) 40 MG tablet Take 40 mg by mouth once daily.    SYMBICORT 160-4.5 mcg/actuation HFAA 2 puffs 2 (two) times daily.    vitamin E 100 UNIT capsule Take 100 Units by mouth once daily.     Family History    None       Tobacco Use    Smoking status: Never    Smokeless tobacco: Never   Substance and Sexual Activity    Alcohol use: Never    Drug use: Never    Sexual activity: Not on file     Review of Systems   Unable to perform ROS: Mental status change   Objective:     Vital Signs (Most Recent):  Temp: 96.8 °F (36 °C) (04/23/23 1052)  Pulse: 89 (04/23/23 1052)  Resp: 20 (04/23/23 1052)  BP: (!) 177/85 (04/23/23 1052)  SpO2: 95 % (04/23/23 1052) Vital Signs (24h Range):  Temp:  [96 °F (35.6 °C)-97.4 °F (36.3 °C)] 96.8 °F (36 °C)  Pulse:  [80-90] 89  Resp:  [16-24] 20  SpO2:  [95 %-97 %] 95 %  BP: (151-177)/(80-85) 177/85     Weight: 61.7 kg (136 lb)  Body mass index is 22.63 kg/m².    Physical Exam  Constitutional:       General: She is not in acute distress.     Appearance: She is not ill-appearing.      Comments: She is resting comfortably, occasional facial twitches thought to be seizures.  No meaningful responses, UE and LE are flaccid without any resistance.   Cardiovascular:      Rate and Rhythm: Normal rate and regular rhythm.      Pulses: Normal pulses.      Heart sounds: Normal heart sounds.   Pulmonary:      Comments: Breath sounds are equal and clear, shallow  Abdominal:      General: Bowel sounds are normal.      Palpations: Abdomen is soft.   Skin:     General: Skin is warm and dry.      Findings: No erythema, lesion or rash.   Neurological:      Comments: Does not respond to stimuli           Significant Labs: All pertinent labs within the past 24 hours have been reviewed.    Significant Imaging: I have reviewed all pertinent imaging results/findings within the past 24 hours.      Assessment/Plan:      * Anoxic brain injury  Pt admitted to hospice [facility]    Cont comfort  measures      Anoxic seizures    keppra iv  Morphine prn for pain      VTE Risk Mitigation (From admission, onward)    None          Discharge Planning   LANIE: 4/24/2023     Code Status: DNR   Is the patient medically ready for discharge?:     Reason for patient still in hospital (select all that apply): Treatment                     Mo Fine MD  Department of Hospital Medicine   [facility]/Surg

## 2023-04-24 VITALS
SYSTOLIC BLOOD PRESSURE: 181 MMHG | OXYGEN SATURATION: 98 % | BODY MASS INDEX: 22.66 KG/M2 | TEMPERATURE: 96 F | WEIGHT: 136 LBS | HEART RATE: 93 BPM | HEIGHT: 65 IN | RESPIRATION RATE: 18 BRPM | DIASTOLIC BLOOD PRESSURE: 94 MMHG

## 2023-04-24 PROCEDURE — 63600175 PHARM REV CODE 636 W HCPCS: Performed by: FAMILY MEDICINE

## 2023-04-24 PROCEDURE — 25000003 PHARM REV CODE 250: Performed by: FAMILY MEDICINE

## 2023-04-24 RX ADMIN — MORPHINE SULFATE 2 MG: 2 INJECTION, SOLUTION INTRAMUSCULAR; INTRAVENOUS at 08:04

## 2023-04-24 RX ADMIN — MORPHINE SULFATE 2 MG: 2 INJECTION, SOLUTION INTRAMUSCULAR; INTRAVENOUS at 04:04

## 2023-04-24 RX ADMIN — LEVETIRACETAM 250 MG: 100 INJECTION, SOLUTION INTRAVENOUS at 08:04

## 2023-04-24 RX ADMIN — GLYCOPYRROLATE 0.3 MG: 0.2 INJECTION INTRAMUSCULAR; INTRAVENOUS at 11:04

## 2023-04-24 RX ADMIN — SCOPALAMINE 1 PATCH: 1 PATCH, EXTENDED RELEASE TRANSDERMAL at 11:04

## 2023-04-24 RX ADMIN — MORPHINE SULFATE 2 MG: 2 INJECTION, SOLUTION INTRAMUSCULAR; INTRAVENOUS at 11:04

## 2023-04-24 NOTE — DISCHARGE SUMMARY
Hospital Medicine  Discharge Summary    Patient Name: Lin Flowers  MRN: [id number]  Admit Date: 4/17/2023  Discharge Date:    Status: IP- Hospice   Length of Stay: 7      PHYSICIANS   Admitting Physician: Judy Aguirre MD  Discharging Physician: Mo Fine MD.  Primary Care Physician: Nilton Ndiaye MD        DISCHARGE DIAGNOSES         PROCEDURES       HOSPITAL COURSE    88 yo who had cardiac arrest at home witnessed by family, was down for over 15 minutes prior to institution of CPR, was brought back and placed on vent, treated for sepsis and UTI with physical improvements, however her mental status has not improved at all she was extubated on 04/16/2023 per family agreement and has been with stable vitals and breathing but not responding to any stimuli.  She has been having twitching of face this am which is felt to be seixures.  We started her on some keppra iv.  She is getting morphine prn at this time.  [facility] was consulted and is admitting pt to [facility] services at this time.        Overview/Hospital Course:  04/18/2023 rest in bed no distress  04/19/2023 no change overnight   04/20/2023 appears comfortable no distress  04/21/2023 dose morphine uncomfortable overnight   04/22/2023 no major change overnight   04/23/2023 no changes overnight     D/c home on hospice   Equipment delivered to family home     STATUS  Stable     DISPOSITION  Discharge to home with hospice     DIET  npo    ACTIVITY  As tolerated      FOLLOW-UP         DISCHARGE MEDICATION RECONCILIATION        Medication List        STOP taking these medications      donepeziL 10 MG tablet  Commonly known as: ARICEPT     montelukast 10 mg tablet  Commonly known as: SINGULAIR     multivitamin capsule     pantoprazole 40 MG tablet  Commonly known as: PROTONIX     SYMBICORT 160-4.5 mcg/actuation Hfaa  Generic drug: budesonide-formoterol 160-4.5 mcg     vitamin E 100 UNIT capsule                PHYSICAL EXAM   VITALS: T 96.3 °F  (35.7 °C)   BP (!) 181/94   P 93   RR (!) 22   O2 98 %    Physical Exam  Vitals reviewed.   HENT:      Head: Normocephalic.   Cardiovascular:      Rate and Rhythm: Normal rate.   Pulmonary:      Effort: Pulmonary effort is normal.   Neurological:      Mental Status: She is alert.            Discharge time: 33 minutes     Mo Fine MD  [facility]       DIAGNOSITCS   CBC:   No results for input(s): WBC, HGB, HCT, PLT, NEUTOPHILPCT, LYMPH, MONO, EOSINOPHIL in the last 168 hours.  COAG:  No results for input(s): APTT, INR, PTT in the last 168 hours.  CMP: No results for input(s): GLU, CALCIUM, ALBUMIN, PROT, NA, K, CO2, CL, BUN, CREATININE, ALKPHOS, ALT, AST, BILITOT, MG, PHOS in the last 168 hours.  CrCl cannot be calculated (Patient's most recent lab result is older than the maximum 7 days allowed.).  CARDIAC ENZYMES: No results for input(s): TROPONINI, CPK, CKMB in the last 72 hours.     No results for input(s): AMYLASE, LIPASE in the last 168 hours.      No results for input(s): POCTGLUCOSE in the last 72 hours.     Microbiology Results (last 7 days)       ** No results found for the last 168 hours. **               X-Ray Chest 1 View    Result Date: 4/17/2023  EXAMINATION: STUDY: XR CHEST 1 VIEW CLINICAL HISTORY AND TECHNIQUE: oJsh Germain RT on 4/17/2023 10:40 AM CLINICAL HX: INPT ROOM 207 X 5 DAYS -RIGHT SIDED CHEST TUBEM, PNEUMO -RIB FX'S PAST MEDICAL HX: ASTHMA, ALZHEIMER'S DISEASE, KIDNEY STONE HX TECHNIQUE: 2 VIEW PORTABLE CHEST TECH: KWE/NN COMPARISON: 04/16/2023 FINDINGS: There is no obvious change in position of the patient's right-sided chest tube.  Since the previous examination the patient's endotracheal tube and NG tube have been removed.  There is no evidence of a residual or recurrent right-sided pneumothorax or hemothorax.  Moderate subcutaneous emphysema is noted within the chest wall and most pronounced to the right of midline as seen previously.The cardiac, hilar, and  mediastinal contours appear unremarkable.I see no lobar or segmental infiltrates.No significant pleural effusions are noted.There is moderate demineralization of the skeletal structures.     1. Since the previous examination the patient's endotracheal tube and NG tube have been removed. 2. There is no significant change in position of the patient's right-sided chest tube and there is no evidence of a residual or recurrent right-sided pneumothorax. 3. Moderate subcutaneous emphysema is noted within the chest wall and most pronounced to the right of midline as noted previously Electronically signed by: Mary Carmen Shore Date:    04/17/2023 Time:    11:06    CT Head Without Contrast    Result Date: 4/13/2023  EXAM: CT HEAD WITHOUT CONTRAST CLINICAL INDICATION: Cardiac arrest TECHNIQUE: Axial and multiplanar 2-D reformations provided.  Without IV contrast PRIORS: None FINDINGS: There is no evidence for acute intracranial hemorrhage, mass nor midline shift.  CSF-containing spaces are increased in size consistent with symmetrical global atrophy.  Deep white matter periventricular changes are mild.  Paranasal sinuses, orbits and mastoids are all clear.     1.  No evidence for acute intracranial event 2.  Senescent changes as described (agree with preliminary) All CT scans at [this location] are performed using dose modulation techniques as appropriate to a performed exam including the following:  Automated exposure control; adjustment of the mA and/or kV according to patient size (this includes techniques or standardized protocols for targeted exams where dose is matched to indication / reason for exam; i.e. extremities or head); use of iterative reconstruction technique. Finalized on: 4/13/2023 6:47 AM By:  Mariano Raygoza MD [facility]# [id number]      2023-04-13 06:49:[id number]    [facility]    CTA Chest Non-Coronary (PE Studies)    Result Date: 4/13/2023  EXAM: CTA CHEST NON CORONARY (PE STUDIES) CLINICAL INDICATION: Suspect pulmonary embolism  TECHNIQUE: Axial and multiplanar 2-D reformations provided.  With IV contrast and a CTA technique PRIORS: Chest radiograph FINDINGS: 1.  Bilateral pulmonary emboli are present in both upper lobes and involving the right lower lobe with evidence of right heart strain 2.  Right anterior lateral rib fractures from the third to the seventh ribs with extensive subcutaneous emphysematous changes 5.  Thoracolumbar spondylosis without evidence of acute vertebral body fracture 3.  Right pneumothorax 4.  Patchy airspace disease in the right lung diffusely     1.  Extensive bilateral pulmonary embolization with resultant right heart strain as described above 2.  Right anterior lateral rib fractures with underlying pneumothorax and probable pulmonary contusion 3.  Thoracolumbar spondylosis NOTE: Agree with preliminary report.  Primary physician was notified at the time of the reading. All CT scans at [this location] are performed using dose modulation techniques as appropriate to a performed exam including the following:  Automated exposure control; adjustment of the mA and/or kV according to patient size (this includes techniques or standardized protocols for targeted exams where dose is matched to indication / reason for exam; i.e. extremities or head); use of iterative reconstruction technique. Finalized on: 4/13/2023 6:51 AM By:  Mariano Raygoza MD [facility]# [id number]      2023-04-13 06:53:[id number]    [facility]    X-Ray Chest AP Portable    Result Date: 4/18/2023  EXAMINATION: STUDY: XR CHEST AP PORTABLE CLINICAL HISTORY AND TECHNIQUE: RT Nicolasa on 4/18/2023  9:35 AM CLINICAL HX: INPT ROOM 207 X 6 DAYS -RIGHT SIDED CHEST TUBEM, PNEUMO -RIB FX'S PAST MEDICAL HX: ASTHMA, ALZHEIMER'S DISEASE, KIDNEY STONE HX TECHNIQUE: 1 VIEW CHEST PORTABLE TECH: KWE/NN COMPARISON: 04/17/2023 FINDINGS: There is no obvious change in position of the patient's right-sided chest tube.  A skin fold overlies the periphery of the right lower lung  field.There appears to be a tiny (less than 3% volume), recurrent, right apical pneumothorax.  The previously noted changes of subcutaneous emphysema within the chest wall (more pronounced on the right) does not appear to have changed significantly.  The lungs are slightly under expanded.  The cardiac, hilar, and mediastinal contours appear unremarkable.I see no lobar or segmental infiltrates.No significant pleural effusions are noted.Mild degenerative changes are noted involving the thoracic spine and there is moderate demineralization of the skeletal structures.     1. There appears to be a tiny (less than 3% volume), recurrent, right apical pneumothorax.  The nursing staff and referring physician were notified of this finding at 09:40 on 04/18/2023 via phone call. 2. Fairly extensive subcutaneous emphysema is noted throughout the chest wall (most pronounced on the right) as seen on previous imaging). COMMUNICATION The nursing staff and referring physician were notified of this finding at 09:40 on 04/18/2023 via phone call. Electronically signed by: Mary Carmen Shore Date:    04/18/2023 Time:    09:43    X-Ray Chest AP Portable    Result Date: 4/16/2023  EXAMINATION: STUDY: XR CHEST AP PORTABLE CLINICAL HISTORY AND TECHNIQUE: Bharath Miranda RT on 4/16/2023  4:27 AM CLINICAL HX: ICU 6 X 4  DAYS  VENT PROTOCOL    CARDIAC ARREST    NG TUBE  RT SIDED CHEST TUBE  RIB FX'S PAST MEDICAL HX: ASTHMA, ALZHEIMER'S DISEASE, KIDNEY STONE HX TECHNIQUE: 1V PORTABLE CHEST TECH: RW COMPARISON: 04/15/2023 FINDINGS: There is no obvious change in position of the patient's endotracheal tube, NG tube, or right-sided chest tube.  There is no evidence of a residual or recurrent right-sided pneumothorax.  Moderate subcutaneous emphysema is noted within the right hemithorax and to a much lesser extent the left hemithorax.The cardiac, hilar, and mediastinal contours appear unremarkable.I see no lobar or segmental infiltrates.No significant pleural  effusions are noted.Mild degenerative changes are noted throughout the thoracic spine.     1. There has been little interval change when compared to the previous study of 04/15/2023.  See above comments. Electronically signed by: Mary Carmen Shore Date:    04/16/2023 Time:    06:00    X-Ray Chest AP Portable    Result Date: 4/15/2023  EXAMINATION: STUDY: XR CHEST AP PORTABLE CLINICAL HISTORY AND TECHNIQUE: Bharath Miranda RT on 4/15/2023  4:59 AM CLINICAL HX: ICU 6 X 3  DAYS  VENT PROTOCOL    CARDIAC ARREST    NG TUBE  RT SIDED CHEST TUBE  RIB FX'S PAST MEDICAL HX: ASTHMA, ALZHEIMER'S DISEASE, KIDNEY STONE HX TECHNIQUE: 1V PORTABLE CHEST TECH: RW COMPARISON: 04/14/2023 FINDINGS: There is no obvious change in position of the patient's right-sided chest tube, endotracheal tube, or NG tube.  There appears to be complete resolution of the previously noted right apical pneumothorax.  The subcutaneous emphysema within the right lateral chest wall appears at least slightly improved.The cardiac, hilar, and mediastinal contours appear unremarkable.I see no lobar or segmental infiltrates.No significant pleural effusions are noted.There is moderate demineralization of the skeletal structures with mild degenerative changes noted throughout the thoracic spine.     1. There is overall improvement when compared to the previous examination.  See above comments. Electronically signed by: Mary Carmen Shore Date:    04/15/2023 Time:    07:26    X-Ray Chest AP Portable    Result Date: 4/14/2023  EXAMINATION: STUDY: XR CHEST AP PORTABLE CLINICAL HISTORY AND TECHNIQUE: Bharath Miranda RT on 4/14/2023  4:30 AM CLINICAL HX: ICU 6 X 2 DAYS  VENT PROTOCOL    CARDIAC ARREST    NG TUBE  RT SIDED CHEST TUBE  RIB FX'S PAST MEDICAL HX: ASTHMA, ALZHEIMER'S DISEASE, KIDNEY STONE HX TECHNIQUE: 1V PORTABLE CHEST TECH: RW COMPARISON: None FINDINGS: There is no obvious change in position of the patient's endotracheal tube, NG tube, or chest tube.  Since the previous  examination there has been recurrence of the right apical pneumothorax (estimated to be approximately 15% volume).  Fairly prominent subcutaneous emphysema is noted within the right lateral chest wall and appears to have progressed at least slightly since the prior examination.The cardiac, hilar, and mediastinal contours appear unremarkable.I see no lobar or segmental infiltrates.No significant pleural effusions are noted.There is moderate demineralization of the skeletal structures with mild degenerative changes noted throughout the thoracic spine.     1. Since the previous examination there has been recurrence of the patient's right-sided apical pneumothorax (estimated to be approximately 15% volume). 2. Fairly prominent subcutaneous emphysema is noted throughout the right lateral chest wall and also appears to have progressed since the prior examination. Electronically signed by: Mary Carmen Shore Date:    04/14/2023 Time:    06:09    X-Ray Chest AP Portable    Result Date: 4/13/2023  EXAMINATION: STUDY: XR CHEST AP PORTABLE CLINICAL HISTORY AND TECHNIQUE: RT Nicolasa on 4/13/2023  1:29 PM CURRENT CLINICAL HISTORY: INPT ROOM ICU 6 -RIGHT SIDED CHEST TUBE PLACEMENT PAST MEDICAL HISTORY: N/A TECHNIQUE: 1 VIEW CHEST PORTABLE TECHNOLOGIST: AILYN/BEE COMPARISON: Previous chest x-ray obtained at 10:05 earlier the same day FINDINGS: A large gauge right-sided chest tube is present with the distal tip adjacent to the right hilum.  There appears to be complete resolution of the previously noted right-sided pneumothorax and slight overall improvement in the subcutaneous emphysema within the right lateral chest wall.  The position of the patient's endotracheal tube and NG tube do not appear to have changed significantly.The cardiac, hilar, and mediastinal contours appear unremarkable.I see no lobar or segmental infiltrates.No significant pleural effusions are noted.There is moderate demineralization of the skeletal structures  with mild to moderate degenerative changes involving the thoracic spine.     1. A new, larger gauge right-sided chest tube is present with the distal tip located just lateral to the right hilum and what appears to be complete resolution of the previously noted right-sided pneumothorax and slight overall improvement of the subcutaneous emphysema within the right lateral chest wall.  See above comments. Electronically signed by: Mary Carmen Shore Date:    04/13/2023 Time:    13:39    X-Ray Chest AP Portable    Result Date: 4/13/2023  EXAMINATION: STUDY: XR CHEST AP PORTABLE CLINICAL HISTORY AND TECHNIQUE: Joy Pool RT on 4/13/2023 10:15 AM CLINICAL HX: ICU 6 OG TUBE PLACEMENT PAST MEDICAL HX: ASTHMA, ALZHEIMER'S DISEASE, KIDNEY STONE HX TECHNIQUE: 2V PORTABLE CHEST TECH: NN COMPARISON: None FINDINGS: Since the previous examination the patient has received an NG/OG tube with the distal tip extending into the region of the middle 3rd of the gastric lumen.  The position of the patient's endotracheal tube is unchanged.  The small gauge right-sided thoracentesis catheter has migrated laterally with the distal tip now outside of the right hemithorax and within the chest wall.  There is been further progression of the patient's right-sided apical pneumothorax estimated to be at least 15-20% volume on the current exam.  There is also been at least mild progression of the previously noted subcutaneous emphysema within the right lateral chest wall.  The cardiac, hilar, and mediastinal contours appear unremarkable.I see no lobar or segmental infiltrates.No significant pleural effusions are noted.There is moderate demineralization of the skeletal structures with mild degenerative changes noted throughout the thoracic spine.     1. The distal tip of the patient's NG/OG tube is located within the region of the middle 3rd of the gastric lumen. 2. The distal tip of the patient's right-sided chest tube has migrated laterally and is now  located within the right lateral chest wall external to the pleural space.  The nursing staff in the ICU were notified of this finding at 10:20 on 04/13/2023. 3. There is been further progression of the right apical pneumothorax which is now estimated to be at least 15-20% volume. 4. There is also at least mild progression of the previously noted subcutaneous emphysema within the right lateral chest wall. Electronically signed by: Mary Carmen Shore Date:    04/13/2023 Time:    10:21    X-Ray Chest AP Portable    Result Date: 4/13/2023  EXAMINATION: STUDY: XR CHEST AP PORTABLE CLINICAL HISTORY AND TECHNIQUE: Bharath Miranda RT on 4/13/2023  6:21 AM CURRENT CLINICAL HISTORY: ER PT FOLLOW UP  CHEST TUBE PLACEMENT PTA  CARDIAC ARREST     RT SIDED PNEUMOTHORAX    RT SIDED CHEST TUBE PLACEMENT   PT ON VENT   LOOKED AT BY DR PINEDO PMH: ASTHMA ALZHEIMER'S DISEASE  KIDNEY STONE TECHNIQUE:1V  PORTABLE CHEST TECHNOLOGIST: ANNAMARIE COMPARISON: Chest x-ray obtained approximately 1-1/2 hours earlier FINDINGS: The position of the patient's small gauge right-sided chest tube and endotracheal 2 has not changed appreciably.  Since the prior examination there is continued expansion of the right apical pneumothorax (estimated to be approximately 10% volume on the current exam) and continued progression of the subcutaneous emphysema within the right chest wall.  The cardiac, hilar, and mediastinal contours appear unremarkable.I see no lobar or segmental infiltrates.No significant pleural effusions are noted.There is moderate demineralization of the skeletal structures with mild degenerative changes noted involving the thoracic spine.     1. There is continued progression of the patient's right apical pneumothorax and right-sided subcutaneous emphysema as described above.  See above comments for details. Electronically signed by: Mary Carmen Shore Date:    04/13/2023 Time:    06:52    X-Ray Chest AP Portable    Result Date: 4/13/2023  EXAMINATION: STUDY:  XR CHEST AP PORTABLE CLINICAL HISTORY AND TECHNIQUE: Bharath Miranda, RT on 4/13/2023  4:43 AM CURRENT CLINICAL HISTORY: ER PT PTA  CARDIAC ARREST     RT SIDED PNEUMOTHORAX    RT SIDED CHEST TUBE PLACEMENT   PT ON VENT   LOOKED AT BY DR PINEDO PMH: ASTHMA ALZHEIMER'S DISEASE  KIDNEY STONE TECHNIQUE:1V  PORTABLE CHEST TECHNOLOGIST: RW COMPARISON: None FINDINGS: The distal tip of the patient's endotracheal tube is approximately 4 cm above the fernanda.  A small gauge, right-sided chest tube is present with the distal tip located within the region of the right hilum.  The previously noted right apical pneumothorax appears to have expanded slightly with an estimated volume approximately 5 % on the current exam.  The previously noted right axillary subcutaneous emphysema has also progressed considerably extending more inferiorly along the right lateral chest wall and into the right supraclavicular region.The cardiac, hilar, and mediastinal contours appear unremarkable.I see no lobar or segmental infiltrates.No significant pleural effusions are noted.There is moderate demineralization of the skeletal structures with mild degenerative changes noted involving thoracic spine.     1. A small gauge, right-sided chest tube is present with the distal tip located within the region of the right hilum. 2. The previously noted right apical pneumothorax has expanded slightly since prior examination (estimated volume of approximately 5%) and the previously noted right-sided subcutaneous emphysema has progressed considerably. Electronically signed by: Mary Carmen Shore Date:    04/13/2023 Time:    04:46    X-Ray Chest AP Portable    Result Date: 4/13/2023  EXAMINATION: STUDY: XR CHEST AP PORTABLE CLINICAL HISTORY AND TECHNIQUE: Bharath Miranda, RT on 4/13/2023 12:06 AM CURRENT CLINICAL HISTORY: ER PT PTA  CARDIAC ARREST     ET TUBE PLACEMENT PMH: ASTHMA ALZHEIMER'S DISEASE  KIDNEY STONE TECHNIQUE:1V  PORTABLE CHEST TECHNOLOGIST: RW COMPARISON:  03/03/2023 FINDINGS: The image is overpenetrated and less than optimal.  The distal tip of the patient's endotracheal tube is 3-4 cm above the fernanda.  A small (less than 5% volume) right apical pneumothorax is present.  A moderate amount of subcutaneous emphysema is noted within the region of the right axilla.  Skin folds overlie the mid and lower right lung.  The cardiac, hilar, and mediastinal contours appear unremarkable.I see no lobar or segmental infiltrates.No significant pleural effusions are noted.There is moderate demineralization of the skeletal structures with mild degenerative changes noted involving the thoracic spine.     1. The distal tip of the patient's endotracheal tube is approximately 3-4 cm above the fernanda. 2. Moderate subcutaneous emphysema is noted within the region of the right axilla and a small (less than 5% volume) right apical pneumothorax is present.  See above comments.The emergency room physician was notified of this finding at the time of this interpretation (approximately 04:30 on 04/13/2023 via telephone). COMMUNICATION This critical result was discovered/received at 04:30 on 04/13/2023.  The critical information above was relayed directly by me by telephone to Dr. Quintero on 04/13/2023 at 04:30. 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     1. No evidence of deep vein  thrombus. Electronically signed by: Mary Carmen Shore Date:    04/13/2023 Time:    09:54    Echo    Result Date: 4/13/2023  · The left ventricle is normal in size with normal systolic function. LVEF 60-65%. · Grade I left ventricular diastolic dysfunction. · Normal right ventricular size with normal right ventricular systolic function. No evidence of Velazquez's sign. · There is mild aortic valve stenosis. · Mild to moderate tricuspid regurgitation. · Mechanically ventilated; cannot use inferior caval vein diameter to estimate central venous pressure.  Hx of cardiac arrest b/l PE chest tube

## 2023-04-24 NOTE — PLAN OF CARE
Problem: Adult Inpatient Plan of Care  Goal: Plan of Care Review  Outcome: Met  Goal: Patient-Specific Goal (Individualized)  Outcome: Met  Goal: Absence of Hospital-Acquired Illness or Injury  Outcome: Met  Goal: Optimal Comfort and Wellbeing  Outcome: Met  Goal: Readiness for Transition of Care  Outcome: Met     Problem: Nutrition Impaired (Sepsis/Septic Shock)  Goal: Optimal Nutrition Intake  Description: IP hospice patient  Outcome: Met     Problem: Fall Injury Risk  Goal: Absence of Fall and Fall-Related Injury  Outcome: Met     Problem: Skin Injury Risk Increased  Goal: Skin Health and Integrity  Outcome: Met     Problem: Impaired Wound Healing  Goal: Optimal Wound Healing  Outcome: Met

## 2023-07-17 PROBLEM — I26.99 BILATERAL PULMONARY EMBOLISM: Status: RESOLVED | Noted: 2023-04-13 | Resolved: 2023-07-17

## 2023-07-17 PROBLEM — R65.21 SEVERE SEPSIS WITH SEPTIC SHOCK: Status: RESOLVED | Noted: 2023-04-13 | Resolved: 2023-07-17

## 2023-07-17 PROBLEM — N39.0 COMPLICATED UTI (URINARY TRACT INFECTION): Status: RESOLVED | Noted: 2023-04-13 | Resolved: 2023-07-17

## 2023-07-17 PROBLEM — A41.9 SEVERE SEPSIS WITH SEPTIC SHOCK: Status: RESOLVED | Noted: 2023-04-13 | Resolved: 2023-07-17

## 2023-07-17 PROBLEM — J96.01 ACUTE HYPOXEMIC RESPIRATORY FAILURE: Status: RESOLVED | Noted: 2023-04-13 | Resolved: 2023-07-17
